# Patient Record
Sex: MALE | Race: WHITE | NOT HISPANIC OR LATINO | Employment: FULL TIME | URBAN - METROPOLITAN AREA
[De-identification: names, ages, dates, MRNs, and addresses within clinical notes are randomized per-mention and may not be internally consistent; named-entity substitution may affect disease eponyms.]

---

## 2017-01-04 DIAGNOSIS — E78.2 MIXED HYPERLIPIDEMIA: Primary | ICD-10-CM

## 2017-01-04 RX ORDER — ATORVASTATIN CALCIUM 10 MG/1
10 TABLET, FILM COATED ORAL DAILY
Qty: 30 TABLET | Refills: 3 | Status: SHIPPED | OUTPATIENT
Start: 2017-01-04 | End: 2017-03-13

## 2017-01-05 ENCOUNTER — TELEPHONE (OUTPATIENT)
Dept: FAMILY MEDICINE CLINIC | Facility: CLINIC | Age: 52
End: 2017-01-05

## 2017-01-05 NOTE — TELEPHONE ENCOUNTER
----- Message from Nuria Byers PA-C sent at 1/4/2017  9:10 AM EST -----  1.  Please notify patient that his FBS was normal at 80.  2.  Cholesterol was 221,triglycerides was 175,HDL was 36 and LDL was 150. These were abnormal. These results have been running high for the past year.  Diet is not helping.  He needs to be started on a statin RX.  I have called to pharmacy Lipitor 10 mg to pharmacy.  He needs to decreased his fatty food intake and increased his physical activity.  Also decrease ETOH.  Plan to repeat FBW in 3 months ( CMP and Lipid).

## 2017-03-09 LAB
ALBUMIN SERPL-MCNC: 4.4 G/DL (ref 3.5–5.2)
ALBUMIN/GLOB SERPL: 2.3 G/DL
ALP SERPL-CCNC: 57 U/L (ref 40–129)
ALT SERPL-CCNC: 27 U/L (ref 5–41)
AST SERPL-CCNC: 16 U/L (ref 5–40)
BILIRUB SERPL-MCNC: 1.1 MG/DL (ref 0.2–1.2)
BUN SERPL-MCNC: 11 MG/DL (ref 6–20)
BUN/CREAT SERPL: 10.8 (ref 7–25)
CALCIUM SERPL-MCNC: 9.1 MG/DL (ref 8.6–10.5)
CHLORIDE SERPL-SCNC: 100 MMOL/L (ref 98–107)
CHOLEST SERPL-MCNC: 161 MG/DL (ref 0–200)
CO2 SERPL-SCNC: 32.4 MMOL/L (ref 22–29)
CREAT SERPL-MCNC: 1.02 MG/DL (ref 0.76–1.27)
GLOBULIN SER CALC-MCNC: 1.9 GM/DL
GLUCOSE SERPL-MCNC: 94 MG/DL (ref 65–99)
HDLC SERPL-MCNC: 39 MG/DL (ref 40–60)
LDLC SERPL CALC-MCNC: 86 MG/DL (ref 0–100)
LDLC/HDLC SERPL: 2.19 {RATIO}
POTASSIUM SERPL-SCNC: 4.4 MMOL/L (ref 3.5–5.2)
PROT SERPL-MCNC: 6.3 G/DL (ref 6–8.5)
SODIUM SERPL-SCNC: 140 MMOL/L (ref 136–145)
TRIGL SERPL-MCNC: 182 MG/DL (ref 0–150)
VLDLC SERPL CALC-MCNC: 36.4 MG/DL (ref 8–32)

## 2017-03-13 ENCOUNTER — TELEPHONE (OUTPATIENT)
Dept: FAMILY MEDICINE CLINIC | Facility: CLINIC | Age: 52
End: 2017-03-13

## 2017-03-13 ENCOUNTER — LAB (OUTPATIENT)
Dept: FAMILY MEDICINE CLINIC | Facility: CLINIC | Age: 52
End: 2017-03-13

## 2017-03-13 DIAGNOSIS — E78.2 MIXED HYPERLIPIDEMIA: ICD-10-CM

## 2017-03-13 DIAGNOSIS — E78.2 MIXED HYPERLIPIDEMIA: Primary | ICD-10-CM

## 2017-03-13 RX ORDER — ATORVASTATIN CALCIUM 20 MG/1
20 TABLET, FILM COATED ORAL DAILY
Qty: 30 TABLET | Refills: 3 | Status: SHIPPED | OUTPATIENT
Start: 2017-03-13 | End: 2018-11-02

## 2017-03-13 NOTE — TELEPHONE ENCOUNTER
----- Message from Nuria Byers PA-C sent at 3/10/2017  7:31 AM EST -----  1.  Please notify patient that fasting blood sugar was normal at 94.  2.  Cholesterol 161, triglycerides 182, HDL 39 and LDL was 86.  His cholesterol readings have improved from 2 months ago.  His triglycerides are still slightly elevated.  Is he still taking his generic Lipitor medication?

## 2017-04-24 ENCOUNTER — TELEPHONE (OUTPATIENT)
Dept: GASTROENTEROLOGY | Facility: CLINIC | Age: 52
End: 2017-04-24

## 2017-04-25 ENCOUNTER — HOSPITAL ENCOUNTER (OUTPATIENT)
Facility: HOSPITAL | Age: 52
Setting detail: HOSPITAL OUTPATIENT SURGERY
End: 2017-04-25
Attending: SURGERY | Admitting: SURGERY

## 2017-04-25 ENCOUNTER — PREP FOR SURGERY (OUTPATIENT)
Dept: SURGERY | Facility: CLINIC | Age: 52
End: 2017-04-25

## 2017-04-25 DIAGNOSIS — Z12.11 COLON CANCER SCREENING: Primary | ICD-10-CM

## 2017-05-02 ENCOUNTER — TELEPHONE (OUTPATIENT)
Dept: SURGERY | Facility: CLINIC | Age: 52
End: 2017-05-02

## 2017-05-02 ENCOUNTER — PREP FOR SURGERY (OUTPATIENT)
Dept: OTHER | Facility: HOSPITAL | Age: 52
End: 2017-05-02

## 2017-05-02 DIAGNOSIS — Z12.11 COLON CANCER SCREENING: Primary | ICD-10-CM

## 2017-05-30 ENCOUNTER — TELEPHONE (OUTPATIENT)
Dept: SURGERY | Facility: CLINIC | Age: 52
End: 2017-05-30

## 2017-12-11 ENCOUNTER — OFFICE VISIT (OUTPATIENT)
Dept: FAMILY MEDICINE CLINIC | Facility: CLINIC | Age: 52
End: 2017-12-11

## 2017-12-11 VITALS
BODY MASS INDEX: 25.67 KG/M2 | OXYGEN SATURATION: 98 % | SYSTOLIC BLOOD PRESSURE: 122 MMHG | TEMPERATURE: 98.3 F | HEIGHT: 74 IN | HEART RATE: 79 BPM | WEIGHT: 200 LBS | DIASTOLIC BLOOD PRESSURE: 90 MMHG | RESPIRATION RATE: 16 BRPM

## 2017-12-11 DIAGNOSIS — I10 ESSENTIAL HYPERTENSION: ICD-10-CM

## 2017-12-11 DIAGNOSIS — E78.2 MIXED HYPERLIPIDEMIA: Primary | ICD-10-CM

## 2017-12-11 DIAGNOSIS — R07.89 CHEST PAIN, ATYPICAL: ICD-10-CM

## 2017-12-11 LAB
ALBUMIN SERPL-MCNC: 4.5 G/DL (ref 3.5–5.2)
ALBUMIN/GLOB SERPL: 2.8 G/DL
ALP SERPL-CCNC: 49 U/L (ref 40–129)
ALT SERPL-CCNC: 23 U/L (ref 5–41)
AST SERPL-CCNC: 15 U/L (ref 5–40)
BASOPHILS # BLD AUTO: 0.05 10*3/MM3 (ref 0–0.2)
BASOPHILS NFR BLD AUTO: 0.7 % (ref 0–2)
BILIRUB SERPL-MCNC: 1 MG/DL (ref 0.2–1.2)
BUN SERPL-MCNC: 12 MG/DL (ref 6–20)
BUN/CREAT SERPL: 12.1 (ref 7–25)
CALCIUM SERPL-MCNC: 9.1 MG/DL (ref 8.6–10.5)
CHLORIDE SERPL-SCNC: 104 MMOL/L (ref 98–107)
CHOLEST SERPL-MCNC: 185 MG/DL (ref 0–200)
CO2 SERPL-SCNC: 29.7 MMOL/L (ref 22–29)
CREAT SERPL-MCNC: 0.99 MG/DL (ref 0.76–1.27)
EOSINOPHIL # BLD AUTO: 0.21 10*3/MM3 (ref 0.1–0.3)
EOSINOPHIL NFR BLD AUTO: 3 % (ref 0–4)
ERYTHROCYTE [DISTWIDTH] IN BLOOD BY AUTOMATED COUNT: 12.8 % (ref 11.5–14.5)
GFR SERPLBLD CREATININE-BSD FMLA CKD-EPI: 79 ML/MIN/1.73
GFR SERPLBLD CREATININE-BSD FMLA CKD-EPI: 96 ML/MIN/1.73
GLOBULIN SER CALC-MCNC: 1.6 GM/DL
GLUCOSE SERPL-MCNC: 95 MG/DL (ref 65–99)
HCT VFR BLD AUTO: 47.9 % (ref 42–52)
HDLC SERPL-MCNC: 41 MG/DL (ref 40–60)
HGB BLD-MCNC: 15.9 G/DL (ref 14–18)
IMM GRANULOCYTES # BLD: 0.03 10*3/MM3 (ref 0–0.03)
IMM GRANULOCYTES NFR BLD: 0.4 % (ref 0–0.5)
LDLC SERPL CALC-MCNC: 117 MG/DL (ref 0–100)
LDLC/HDLC SERPL: 2.86 {RATIO}
LYMPHOCYTES # BLD AUTO: 1.3 10*3/MM3 (ref 0.6–4.8)
LYMPHOCYTES NFR BLD AUTO: 18.8 % (ref 20–45)
MCH RBC QN AUTO: 29.4 PG (ref 27–31)
MCHC RBC AUTO-ENTMCNC: 33.2 G/DL (ref 31–37)
MCV RBC AUTO: 88.5 FL (ref 80–94)
MONOCYTES # BLD AUTO: 0.67 10*3/MM3 (ref 0–1)
MONOCYTES NFR BLD AUTO: 9.7 % (ref 3–8)
NEUTROPHILS # BLD AUTO: 4.64 10*3/MM3 (ref 1.5–8.3)
NEUTROPHILS NFR BLD AUTO: 67.4 % (ref 45–70)
NRBC BLD AUTO-RTO: 0 /100 WBC (ref 0–0)
PLATELET # BLD AUTO: 255 10*3/MM3 (ref 140–500)
POTASSIUM SERPL-SCNC: 4.6 MMOL/L (ref 3.5–5.2)
PROT SERPL-MCNC: 6.1 G/DL (ref 6–8.5)
RBC # BLD AUTO: 5.41 10*6/MM3 (ref 4.7–6.1)
SODIUM SERPL-SCNC: 145 MMOL/L (ref 136–145)
TRIGL SERPL-MCNC: 134 MG/DL (ref 0–150)
VLDLC SERPL CALC-MCNC: 26.8 MG/DL (ref 8–32)
WBC # BLD AUTO: 6.9 10*3/MM3 (ref 4.8–10.8)

## 2017-12-11 PROCEDURE — 93000 ELECTROCARDIOGRAM COMPLETE: CPT | Performed by: PHYSICIAN ASSISTANT

## 2017-12-11 PROCEDURE — 99214 OFFICE O/P EST MOD 30 MIN: CPT | Performed by: PHYSICIAN ASSISTANT

## 2017-12-11 NOTE — PROGRESS NOTES
Subjective   Pj Steele is a 52 y.o. male.   Chief Complaint   Patient presents with   • Hypertension     management   • Hyperlipidemia     Management       History of Present Illness     Pj is a 52-year-old male who presents for hyperlipidemia and hypertension management.  He has lost 11 pounds since December 16, 2016. Date she has not been taking his blood pressure medication regularly.  This is due to his wife undergoing chemotherapy for plasma cell leukemia.  States he has been having a hectic schedule with working and taking care of his wife.  States he doesn't need anything for depression or anxiety at this time.  Denied any suicidal or homicidal ideation.  Pj states he had one episode of chest pain yesterday afternoon.  He has one episode of radiation of pain down left arm.  He has not had a heart attack or any other symptoms in the past.  Denied any chest pain at today's office visit, shortness of air, dizziness, cough, headache, or swelling of ankles.  His appetite has been slightly healthy and sleep has been normal.  States he is fasting at today's office visit.  He is doing well with the cholesterol medication.  Denied any muscle aches.  Bowel movements have been daily without dark black tarry stools.  He had the flu shot in September 2017.      The following portions of the patient's history were reviewed and updated as appropriate: allergies, current medications, past family history, past medical history, past social history and past surgical history.    Review of Systems   Constitutional: Negative.    HENT: Negative.    Eyes: Negative.    Respiratory: Negative.  Negative for cough, shortness of breath and wheezing.    Cardiovascular: Positive for chest pain. Negative for palpitations and leg swelling.   Gastrointestinal: Negative.  Negative for constipation, diarrhea, nausea and vomiting.   Endocrine: Negative.    Genitourinary: Negative.    Musculoskeletal: Negative.    Skin: Negative.   "  Allergic/Immunologic: Negative.    Neurological: Negative.  Negative for dizziness, light-headedness and headaches.   Hematological: Negative.    Psychiatric/Behavioral: Negative.  Negative for self-injury, sleep disturbance and suicidal ideas.   All other systems reviewed and are negative.    Vitals:    12/11/17 0740   BP: 122/90   BP Location: Left arm   Patient Position: Sitting   Cuff Size: Adult   Pulse: 79   Resp: 16   Temp: 98.3 °F (36.8 °C)   TempSrc: Oral   SpO2: 98%   Weight: 90.7 kg (200 lb)   Height: 188 cm (74\")       Wt Readings from Last 3 Encounters:   12/11/17 90.7 kg (200 lb)   12/16/16 95.7 kg (211 lb)   09/19/16 94.7 kg (208 lb 12.8 oz)       BP Readings from Last 3 Encounters:   12/11/17 122/90   12/16/16 132/78   09/19/16 132/80     Body mass index is 25.68 kg/(m^2).  Allergies   Allergen Reactions   • Amoxicillin          Objective   Physical Exam   Constitutional: He is oriented to person, place, and time. Vital signs are normal. He appears well-developed.   Neck: Trachea normal and phonation normal. Neck supple. Carotid bruit is not present. No edema present.   Cardiovascular: Normal rate, regular rhythm, S1 normal, S2 normal, normal heart sounds and normal pulses.    Pulmonary/Chest: Effort normal and breath sounds normal.   Abdominal: Soft. Normal appearance and bowel sounds are normal. There is no hepatomegaly. There is no tenderness.   Neurological: He is alert and oriented to person, place, and time.   Skin: Skin is warm, dry and intact.   Psychiatric: He has a normal mood and affect. His speech is normal and behavior is normal. Judgment and thought content normal. Cognition and memory are normal.           ECG 12 Lead  Date/Time: 12/11/2017 8:10 AM  Performed by: JARON WATSON  Authorized by: JARON WATSON   Comparison: not compared with previous ECG   Previous ECG: no previous ECG available  Rhythm: sinus rhythm  Rate: normal  BPM: 66  Conduction: conduction normal  ST " Segments: ST segments normal  QRS axis: normal  Clinical impression: normal ECG  Comments: Indiictation: Atypical chest pain with hypertension history  P/LA:  122/160 ms  QRS:  106 ms  QT/Qtc:  420/440 ms              Assessment/Plan   Pj was seen today for hypertension and hyperlipidemia.    Diagnoses and all orders for this visit:    Mixed hyperlipidemia  -     CBC & Differential  -     Comprehensive Metabolic Panel  -     Lipid Panel With LDL / HDL Ratio  -     Ambulatory Referral to Cardiology    Essential hypertension  -     ECG 12 Lead  -     CBC & Differential  -     Comprehensive Metabolic Panel  -     Lipid Panel With LDL / HDL Ratio  -     Ambulatory Referral to Cardiology    Chest pain, atypical  -     ECG 12 Lead  -     CBC & Differential  -     Comprehensive Metabolic Panel  -     Lipid Panel With LDL / HDL Ratio  -     Ambulatory Referral to Cardiology    1.  Atypical chest pain with hypertension: I have rechecked his blood pressure at today's office visit and got 120/86 and left arm.  I have stressed to him the importance of taking his blood pressure medication daily.  Pj will keep a blood pressure log at home.  He will update blood pressure readings next week.  He may stop by office next week for blood pressure check as well.  We will hold any change in medication at this time due to him not taking his medication routinely.  Pj will return to office in one month for recheck on hypertension.  Pj had a EKG at today's office visit.  I will schedule a referral to cardiology for possible stress testing.  Pj was instructed if chest pain reoccurs he is to go to the nearest emergency room for evaluation and treatment.  He voiced understanding.    2.  Chronic hyperlipidemia: Pj is doing well with the medication.  He will have a CBC, CMP and a lipid profile collected at today's office visit.  Pj was instructed to eat healthy and try to exercise.  He'll be notified of test results and any  medication changes and results are completed.  He voiced understanding.          Jamil transcription disclaimer    Much of this encounter note is an electronic transcription/translation of spoken language to printed text.  The electronic translation of spoken language may permit erroneous, or at times, nonsensical words or phrases to be inadvertently transcribed.  Although I have reviewed the note for such errors, some may still exist.    Nuria Byers PA-C  Family Practice

## 2017-12-13 ENCOUNTER — TELEPHONE (OUTPATIENT)
Dept: FAMILY MEDICINE CLINIC | Facility: CLINIC | Age: 52
End: 2017-12-13

## 2017-12-13 NOTE — TELEPHONE ENCOUNTER
----- Message from Nuria Byers PA-C sent at 12/13/2017  7:12 AM EST -----  1.   Notify patient that CBC was normal.  2.   Fasting blood sugar was normal at 95.  3.  Cholesterol was 185, triglycerides 134, HDL 41 and LDL was 117.  LDL was slightly elevated.  Please decrease fatty food intake.  Plan to repeat fasting blood work in one year.

## 2017-12-30 DIAGNOSIS — I10 ESSENTIAL HYPERTENSION: ICD-10-CM

## 2018-01-02 RX ORDER — LOSARTAN POTASSIUM 50 MG/1
TABLET ORAL
Qty: 90 TABLET | Refills: 2 | Status: SHIPPED | OUTPATIENT
Start: 2018-01-02 | End: 2019-02-10 | Stop reason: SDUPTHER

## 2018-03-20 ENCOUNTER — PREP FOR SURGERY (OUTPATIENT)
Dept: OTHER | Facility: HOSPITAL | Age: 53
End: 2018-03-20

## 2018-03-20 ENCOUNTER — TELEPHONE (OUTPATIENT)
Dept: SURGERY | Facility: CLINIC | Age: 53
End: 2018-03-20

## 2018-03-20 DIAGNOSIS — Z12.11 COLON CANCER SCREENING: Primary | ICD-10-CM

## 2018-03-20 NOTE — PATIENT INSTRUCTIONS
Dr. Josie Campos     Date: _____04/24/2018_________ Arrival Time: ____8:00am_____    Hospital:  [facility]([address] [place] [address]) (back of hospital at the emergency room)     [facility]([address], [place] [address] bottom floor)       Please buy the following:  • One 64oz or two 32oz bottle(s) of Gatorade or any other non-carbonated drink (NO RED OR PURPLE). You may buy sugar-free if you are diabetic. You may refrigerate if preferred.   • Dulcolax tablets (not suppository or stool softener - will need 6 tablets).  • Sofia lax 238 grams (8.3oz) powder or generic form polyethylene glycol 3350.  • One bottle of Infants’ Mylicon liquid ask pharmacist for substitute if not available.  SEVEN DAYS BEFORE STOP ASPIRIN, ADVIL, ALEVE, MOTRIN, IBU or anything listed on the back of this form.  The day prior to colonoscopy, you will need to follow a clear liquid diet.   Clear liquid diet requirements:  You may consume water, fruit juices, jello, clear broth or bouillon, popsicles, Sprite, sports drinks, etc. (no orange, grapefruit or V-8). Please consume plenty of clear liquids. AVOID: All solid foods, dairy products and anything red or purple. Limit coffee and tea.  The day prior to colonoscopy, begin prep as detailed below:  1) In AM, mix all Sofia lax, all Gatorade and 3 milliliters of the Mylicon drops (.3 x 10=3ml) Stir/shake contents until completely dissolved. Chill if preferred. DO NOT DRINK YET.  2) At 9AM, take 3 tablets of Dulcolax pills with a large glass of water.  3) At 11AM, start drinking one 8oz glass of the Gatorade/Sofia lax/Mylicon solution every 15 minutes until half of solution is gone.   4) At 5PM, drink other half of solution by drinking an 8oz glass every 15 minutes.  5) At 6PM, take last 3 tablets with a large glass of water.  6) NOTHING BY MOUTH AFTER MIDNIGHT. Or 8 hours prior.  You may take your morning heart, blood pressure, seizure, psych and  breathing medications with a small sip of water. No gum or candy.  7) You will need someone to drive you home after your exam. The average time spent is around 2-3 hours. You are not to drive, operate machinery or make legal decisions the remainder of the day.  Helpful tips:  • Some people may develop nausea/vomiting during this prep. The best option for this is to stop drinking the solution for about 30 minutes, then resume drinking at a slower rate. It is important to drink entire contents of solution.  • Walking in between drinking each glass reduces bloating.  • If diabetic, use sugar-free drinks and monitor blood sugar closely to prevent low blood sugar.      Please call the office the morning of your procedure if your bowel movements are not clearish. [phone] Viviana. If it is after hours or the weekend and you need to reach the provider or the office please call [phone].  Please call the office as soon as possible if you need to reschedule or cancel your procedure you must give two weeks’ notice.  If you do not show up or frequently reschedule your procedure your provider has the option of not rescheduling.   It is your responsibility to check with your insurance company to determine benefits and out of pocket costs.     Avoid these medications 7 days prior to surgery  Please check with your prescribing doctor before stopping any medications    NSAIDS- Advil, Aleve, Motrin, Ibuprofen, Midol, Excedrin, Fiorinal, [place]  (Some cold medications may have these in them)    All herbal medications- iron, vitamin E and D, fish oil, decongestants (phenylephrine, pseudoephedrine), ginkgo, garlic, ginseng, Ryder’s wart    Mobic (meloxicam), Celebrex, Diclofenac (Voltaren), Nambumetone (Relafen), Daypro, Naproxen, Sulindac, Indomethacin, Toradol, Feldine, Salsalate, Etodolac (Lodine),     Viagra, Cialis, Levitra    Aspirin, Plavix (clopidogrel), Effient, Pletal, Coumadin, Pradaxa, Brilinta, Ticlide,  Eliquis, (Xaralto- 3 days)      Diet pills -Adipex (phentermine) -2 weeks prior

## 2018-03-21 PROBLEM — Z12.11 COLON CANCER SCREENING: Status: ACTIVE | Noted: 2018-03-21

## 2018-04-23 ENCOUNTER — ANESTHESIA EVENT (OUTPATIENT)
Dept: PERIOP | Facility: HOSPITAL | Age: 53
End: 2018-04-23

## 2018-04-24 ENCOUNTER — HOSPITAL ENCOUNTER (OUTPATIENT)
Facility: HOSPITAL | Age: 53
Setting detail: HOSPITAL OUTPATIENT SURGERY
Discharge: HOME | End: 2018-04-24
Attending: SURGERY | Admitting: SURGERY

## 2018-04-24 ENCOUNTER — ANESTHESIA (OUTPATIENT)
Dept: PERIOP | Facility: HOSPITAL | Age: 53
End: 2018-04-24

## 2018-04-24 VITALS
TEMPERATURE: 97.5 F | BODY MASS INDEX: 25.62 KG/M2 | HEART RATE: 65 BPM | DIASTOLIC BLOOD PRESSURE: 89 MMHG | SYSTOLIC BLOOD PRESSURE: 139 MMHG | RESPIRATION RATE: 16 BRPM | WEIGHT: 199.6 LBS | OXYGEN SATURATION: 97 % | HEIGHT: 74 IN

## 2018-04-24 DIAGNOSIS — Z12.11 COLON CANCER SCREENING: ICD-10-CM

## 2018-04-24 PROCEDURE — 45380 COLONOSCOPY AND BIOPSY: CPT | Performed by: SURGERY

## 2018-04-24 PROCEDURE — 25010000002 PROPOFOL 10 MG/ML EMULSION: Performed by: ANESTHESIOLOGY

## 2018-04-24 PROCEDURE — S0260 H&P FOR SURGERY: HCPCS | Performed by: SURGERY

## 2018-04-24 RX ORDER — LIDOCAINE HYDROCHLORIDE 10 MG/ML
0.5 INJECTION, SOLUTION EPIDURAL; INFILTRATION; INTRACAUDAL; PERINEURAL ONCE AS NEEDED
Status: COMPLETED | OUTPATIENT
Start: 2018-04-24 | End: 2018-04-24

## 2018-04-24 RX ORDER — PROPOFOL 10 MG/ML
VIAL (ML) INTRAVENOUS AS NEEDED
Status: DISCONTINUED | OUTPATIENT
Start: 2018-04-24 | End: 2018-04-24 | Stop reason: SURG

## 2018-04-24 RX ORDER — SODIUM CHLORIDE, SODIUM LACTATE, POTASSIUM CHLORIDE, CALCIUM CHLORIDE 600; 310; 30; 20 MG/100ML; MG/100ML; MG/100ML; MG/100ML
9 INJECTION, SOLUTION INTRAVENOUS CONTINUOUS
Status: DISCONTINUED | OUTPATIENT
Start: 2018-04-24 | End: 2018-04-24 | Stop reason: HOSPADM

## 2018-04-24 RX ORDER — MAGNESIUM HYDROXIDE 1200 MG/15ML
LIQUID ORAL AS NEEDED
Status: DISCONTINUED | OUTPATIENT
Start: 2018-04-24 | End: 2018-04-24 | Stop reason: HOSPADM

## 2018-04-24 RX ORDER — SODIUM CHLORIDE 9 MG/ML
40 INJECTION, SOLUTION INTRAVENOUS AS NEEDED
Status: DISCONTINUED | OUTPATIENT
Start: 2018-04-24 | End: 2018-04-24 | Stop reason: HOSPADM

## 2018-04-24 RX ORDER — SODIUM CHLORIDE 0.9 % (FLUSH) 0.9 %
1-10 SYRINGE (ML) INJECTION AS NEEDED
Status: DISCONTINUED | OUTPATIENT
Start: 2018-04-24 | End: 2018-04-24 | Stop reason: HOSPADM

## 2018-04-24 RX ADMIN — SODIUM CHLORIDE, POTASSIUM CHLORIDE, SODIUM LACTATE AND CALCIUM CHLORIDE 9 ML/HR: 600; 310; 30; 20 INJECTION, SOLUTION INTRAVENOUS at 08:05

## 2018-04-24 RX ADMIN — PROPOFOL 600 MG: 10 INJECTION, EMULSION INTRAVENOUS at 09:31

## 2018-04-24 RX ADMIN — LIDOCAINE HYDROCHLORIDE 0.1 ML: 10 INJECTION, SOLUTION EPIDURAL; INFILTRATION; INTRACAUDAL; PERINEURAL at 08:05

## 2018-04-24 NOTE — H&P
"General Surgery      Patient Care Team:  Nuria Byers PA-C as PCP - General    CHIEF COMPLAINT:  Colon cancer screening in patient with family history of colon cancer    HISTORY OF PRESENT ILLNESS: Patient is a 53-year-old male referred to general surgery for colon cancer screening.  He has never had a colonoscopy before.  Denies any abdominal, pelvic pain, alternating bowel movements, diarrhea, constipation, melena, hematochezia, unintentional weight loss.  He denies any problems with hemorrhoids are proctalgia.  He does have a strong family history of colon polyps in his mother and colon cancer-stage IV in his mother who was diagnosed in her 70s.  His past medical history significant for hypertension depression and past surgical history significant for right wrist excision and cholecystectomy.      Past Medical History:   Diagnosis Date   • Depression     Mild   • Hypertension      Past Surgical History:   Procedure Laterality Date   • CHOLECYSTECTOMY     • WRIST SURGERY       Family History   Problem Relation Age of Onset   • Leukemia Father    • Brain cancer Brother      Social History   Substance Use Topics   • Smoking status: Never Smoker   • Smokeless tobacco: Never Used   • Alcohol use Yes     Prescriptions Prior to Admission   Medication Sig Dispense Refill Last Dose   • atorvastatin (LIPITOR) 20 MG tablet Take 1 tablet by mouth Daily. 30 tablet 3 4/20/2018   • losartan (COZAAR) 50 MG tablet TAKE ONE TABLET BY MOUTH DAILY 90 tablet 2 4/24/2018 at 0400     Allergies:  Amoxicillin    REVIEW OF SYSTEMS:  Please see the above history of present illness for pertinent positives and negatives.  The remainder of the patient's systems have been reviewed and are negative.   Vital Signs  Temp:  [97.9 °F (36.6 °C)] 97.9 °F (36.6 °C)  Heart Rate:  [71] 71  Resp:  [16] 16  BP: (144)/(98) 144/98    Flowsheet Rows    Flowsheet Row First Filed Value   Admission Height 188 cm (74\") Documented at 04/23/2018 1436 "   Admission Weight 90.5 kg (199 lb 9.6 oz) Documented at 04/24/2018 0804           Physical Exam:  Physical Exam   Constitutional: Patient appears well-developed and well-nourished and in no acute distress   HEENT:   Head: Normocephalic and atraumatic.   Eyes:  Pupils are equal, round, and reactive to light.  Mouth and Throat: Patient has moist mucous membranes. Oropharynx is clear of any erythema or exudate.     Neck: Neck supple. No JVD present. No thyromegaly present. No lymphadenopathy present.  Cardiovascular: Regular rate, regular rhythm.  Pulmonary/Chest: Lungs are clear to auscultation bilaterally.  Abdominal: Soft, nondistended nontender positive bowel sounds in all 4 quadrants   Musculoskeletal: Normal posture.  Extremities: No edema. Pulses are palpable in all 4 extremities.  Neurological: Patient is alert and oriented.  Psychological:   Mood and behavior appropriate.  Skin: Skin is warm and dry.     Results Review:    I reviewed the patient's new clinical results.          Lab Results (most recent)     None          Imaging Results (most recent)     None          ECG/EMG Results (most recent)     None            Assessment/Plan     Colon cancer screening  Family history of colon polyps and colon cancer  High risk screening    Based on American Cancer Society guidelines I have discussed and recommended to the patient we should proceed with colonoscopy.  Pros and cons/risks and benefits discussed.  I have discussed with him the procedure, risks, benefits, complications including but not limited to risk of bleeding, post-polypectomy syndrome, perforation requiring emergent additional procedures, risk of missing a lesion, cardiopulmonary complications associated with anesthetic.  Patient understands and gives consent.    I discussed the patients findings and my recommendations with patient and his mother.      Josie Campos M.D.    04/24/18  9:31 AM

## 2018-04-24 NOTE — ANESTHESIA PREPROCEDURE EVALUATION
Anesthesia Evaluation     Patient summary reviewed and Nursing notes reviewed   no history of anesthetic complications:  NPO Solid Status: > 8 hours  NPO Liquid Status: > 8 hours           Airway   Mallampati: I  TM distance: >3 FB  Neck ROM: full  No difficulty expected  Dental - normal exam     Pulmonary - normal exam    breath sounds clear to auscultation  Sleep apnea: snores.  Cardiovascular - normal exam  Exercise tolerance: good (4-7 METS)    ECG reviewed  Rhythm: regular  Rate: normal    (+) hypertension (took 3 am), hyperlipidemia,     ROS comment: Nuria Byers PA-C     12/11/2017 10:01 AM    ECG 12 Lead  Date/Time: 12/11/2017 8:10 AM  Performed by: NURIA BYERS  Authorized by: NURIA BYERS   Comparison: not compared with previous ECG   Previous ECG: no previous ECG available  Rhythm: sinus rhythm  Rate: normal  BPM: 66  Conduction: conduction normal  ST Segments: ST segments normal  QRS axis: normal  Clinical impression: normal ECG  Comments: Indiictation: Atypical chest pain with hypertension history  P/CT:  122/160 ms  QRS:  106 ms  QT/Qtc:  420/440 ms    Neuro/Psych  GI/Hepatic/Renal/Endo - negative ROS     Musculoskeletal     (+) neck stiffness (current),   Abdominal  - normal exam   Substance History   (-) drug useAlcohol use: occ.     OB/GYN negative ob/gyn ROS         Other - negative ROS                       Anesthesia Plan    ASA 2     MAC     intravenous induction   Anesthetic plan and risks discussed with patient.

## 2018-04-24 NOTE — DISCHARGE INSTRUCTIONS
HOLD ASPIRIN, ALEVE, IBUPROFEN, MOTRIN, ALL ANTI INFLAMMATORIES FOR 7 DAYS.  USE TYLENOL ONLY FOR ACHES AND PAINS.

## 2018-04-24 NOTE — OP NOTE
Colonoscopy Procedure Note  Date of procedure: 4/24/18    Pre-operative Diagnosis:  Colon cancer screening in patient with family history of colon cancer and colon polyps, high risk screening    Post-operative Diagnosis: Colon polyps ×4                       Internal hemorrhoids              Procedure: Colonoscopy with polypectomy    Surgeon: Josie Campos M.D.    Anesthetic: RONNY Perez M.D.    EBL : Minimal    Complications : None    Indications:  Patient is a 53-year-old male referred to general surgery for colon cancer screening.  He has never had a colonoscopy before.  Denies any abdominal, pelvic pain, alternating bowel movements, melena, hematochezia.  He has strong family history of colon polyps and colon cancer in his mother.  We discussed proceeding with colonoscopy-pros and cons risks and benefits were discussed.  We discussed the procedure, risks, benefits, complications including but not limited to risk of bleeding, post-polypectomy syndrome, perforation requiring emergent additional procedures, risk of missing a lesion and complications associated with anesthetic.  Patient understood and gave informed consent.    Findings/Treatments: Colonoscopy preparation was suboptimal.  I had to copiously irrigate and lavaged to obtain better visualization, this could certainly increase the likelihood of missing a diminutive lesion.  Sessile polyps ×4.  Internal hemorrhoids.         Scope Withdrawal Time:   Greater than 6 minutes      Recommendations:  -Await pathology., -If adenoma is present, repeat colonoscopy in 3 years with a 2 day prep but we'll make final recommendations once pathology is available., -High fiber diet., -Follow up/office will contact patient to make further recommendations..    Procedure Details     After discussing the benefits and risks of the procedure with the patient, not limited to but including:  Bleeding, infection, perforation, aspiration; informed consent was signed.  The  patient was taken into the endoscopy room at [facility] and placed in the left lateral decubitus position.  MAC anesthesia was given with appropriate cardiopulmonary monitoring.  A rectal exam was performed.  Sphincter tone was normal, no rectal masses, no external hemorrhoids.  The colonoscope was then inserted and carefully advanced to the cecum while visualizing the mucosa. Colon preparation was suboptimal.  There was thick stool particularly in the right colon, transverse and descending colon, this had to be copiously irrigated and lavaged in order to get better visualization.  This could certainly increase the likelihood of missing a diminutive lesion.   The cecum was identified by the anatomic landmarks i.e. the cecal strap,  ileocecal valve and the orifice of the appendix.  Scope was then gradually withdrawn, carefully evaluating the colon mucosa in a circumferential fashion with findings as follows: Cecum, ileocecal valve, appendiceal orifice no gross mucosal lesions, no polyps noted.  Scope was brought back into the ascending colon where behind a fold 4 mm sessile polyp noted resected retrieved using cold biopsy forceps, EBL minimal and hemostasis assured.  Scope was brought back to remaining ascending colon, hepatic flexure into the transverse colon where 2 sessile polyps 3-4 mm resected retrieved using cold biopsy forceps, EBL minimal and hemostasis assured.  Scope was then brought back through remaining transverse colon, splenic flexure, descending colon into sigmoid colon where another sessile polyp which was diminutive noted resected retrieved using cold biopsy forceps, EBL minimal and hemostasis assured.  This was at 20 cm.  Scope was then brought back into the rectum, in the distal rectum, retroflex examination performed with evidence of nonbleeding internal hemorrhoids.  Scope was retroflexed, straightened and removed from the patient while desufflated in the colon.    Patient was  awakened, his anesthesia was reversed, and he was taken to recovery room in stable condition having tolerated his procedure well with no immediate complications.    Josie Campos MD

## 2018-04-24 NOTE — ANESTHESIA POSTPROCEDURE EVALUATION
Patient: Pj Steele    Procedure Summary     Date:  04/24/18 Room / Location:  [facility] ENDOSCOPY 2 /  [facility]    Anesthesia Start:  0927 Anesthesia Stop:  1013    Procedure:  COLONOSCOPY, polypectomy (N/A ) Diagnosis:       Colon polyps      Internal hemorrhoids      (Colon cancer screening [Z12.11])    Surgeon:  Josie Campos MD Provider:  Abbi Whitkaer MD    Anesthesia Type:  MAC ASA Status:  2          Anesthesia Type: MAC  Last vitals  BP   121/78 (04/24/18 1020)   Temp   97.5 °F (36.4 °C) (04/24/18 1014)   Pulse   73 (04/24/18 1020)   Resp   14 (04/24/18 1020)     SpO2   95 % (04/24/18 1020)     Post Anesthesia Care and Evaluation    Patient location during evaluation: bedside  Patient participation: complete - patient participated  Level of consciousness: awake and alert  Pain score: 0  Pain management: adequate  Airway patency: patent  Anesthetic complications: No anesthetic complications  PONV Status: none  Cardiovascular status: acceptable  Respiratory status: acceptable  Hydration status: acceptable

## 2018-04-27 LAB
LAB AP CASE REPORT: NORMAL
Lab: NORMAL
PATH REPORT.FINAL DX SPEC: NORMAL

## 2018-11-02 ENCOUNTER — OFFICE VISIT (OUTPATIENT)
Dept: FAMILY MEDICINE CLINIC | Facility: CLINIC | Age: 53
End: 2018-11-02

## 2018-11-02 VITALS
DIASTOLIC BLOOD PRESSURE: 70 MMHG | RESPIRATION RATE: 16 BRPM | TEMPERATURE: 97.2 F | WEIGHT: 210 LBS | BODY MASS INDEX: 26.95 KG/M2 | SYSTOLIC BLOOD PRESSURE: 122 MMHG | HEART RATE: 75 BPM | OXYGEN SATURATION: 98 % | HEIGHT: 74 IN

## 2018-11-02 DIAGNOSIS — E78.2 MIXED HYPERLIPIDEMIA: ICD-10-CM

## 2018-11-02 DIAGNOSIS — Z00.00 ENCOUNTER FOR ANNUAL PHYSICAL EXAM: Primary | ICD-10-CM

## 2018-11-02 DIAGNOSIS — I10 ESSENTIAL HYPERTENSION: ICD-10-CM

## 2018-11-02 DIAGNOSIS — Z23 NEED FOR INFLUENZA VACCINATION: ICD-10-CM

## 2018-11-02 DIAGNOSIS — Z12.5 SCREENING FOR PROSTATE CANCER: ICD-10-CM

## 2018-11-02 LAB
ALBUMIN SERPL-MCNC: 4.7 G/DL (ref 3.5–5.2)
ALBUMIN/GLOB SERPL: 2.8 G/DL
ALP SERPL-CCNC: 54 U/L (ref 40–129)
ALT SERPL-CCNC: 30 U/L (ref 5–41)
AST SERPL-CCNC: 19 U/L (ref 5–40)
BASOPHILS # BLD AUTO: 0.06 10*3/MM3 (ref 0–0.2)
BASOPHILS NFR BLD AUTO: 0.8 % (ref 0–2)
BILIRUB SERPL-MCNC: 0.9 MG/DL (ref 0.2–1.2)
BUN SERPL-MCNC: 11 MG/DL (ref 6–20)
BUN/CREAT SERPL: 12 (ref 7–25)
CALCIUM SERPL-MCNC: 9.1 MG/DL (ref 8.6–10.5)
CHLORIDE SERPL-SCNC: 103 MMOL/L (ref 98–107)
CHOLEST SERPL-MCNC: 214 MG/DL (ref 0–200)
CO2 SERPL-SCNC: 29.1 MMOL/L (ref 22–29)
CREAT SERPL-MCNC: 0.92 MG/DL (ref 0.76–1.27)
EOSINOPHIL # BLD AUTO: 0.24 10*3/MM3 (ref 0.1–0.3)
EOSINOPHIL NFR BLD AUTO: 3.2 % (ref 0–4)
ERYTHROCYTE [DISTWIDTH] IN BLOOD BY AUTOMATED COUNT: 12.3 % (ref 11.5–14.5)
GLOBULIN SER CALC-MCNC: 1.7 GM/DL
GLUCOSE SERPL-MCNC: 90 MG/DL (ref 65–99)
HCT VFR BLD AUTO: 47.4 % (ref 42–52)
HDLC SERPL-MCNC: 46 MG/DL (ref 40–60)
HGB BLD-MCNC: 15.5 G/DL (ref 14–18)
IMM GRANULOCYTES # BLD: 0.02 10*3/MM3 (ref 0–0.03)
IMM GRANULOCYTES NFR BLD: 0.3 % (ref 0–0.5)
LDLC SERPL CALC-MCNC: 144 MG/DL (ref 0–100)
LDLC/HDLC SERPL: 3.13 {RATIO}
LYMPHOCYTES # BLD AUTO: 1.42 10*3/MM3 (ref 0.6–4.8)
LYMPHOCYTES NFR BLD AUTO: 19.1 % (ref 20–45)
MCH RBC QN AUTO: 28.9 PG (ref 27–31)
MCHC RBC AUTO-ENTMCNC: 32.7 G/DL (ref 31–37)
MCV RBC AUTO: 88.3 FL (ref 80–94)
MONOCYTES # BLD AUTO: 0.63 10*3/MM3 (ref 0–1)
MONOCYTES NFR BLD AUTO: 8.5 % (ref 3–8)
NEUTROPHILS # BLD AUTO: 5.06 10*3/MM3 (ref 1.5–8.3)
NEUTROPHILS NFR BLD AUTO: 68.1 % (ref 45–70)
NRBC BLD AUTO-RTO: 0 /100 WBC (ref 0–0)
PLATELET # BLD AUTO: 279 10*3/MM3 (ref 140–500)
POTASSIUM SERPL-SCNC: 4.6 MMOL/L (ref 3.5–5.2)
PROT SERPL-MCNC: 6.4 G/DL (ref 6–8.5)
PSA SERPL-MCNC: 0.98 NG/ML (ref 0–4)
RBC # BLD AUTO: 5.37 10*6/MM3 (ref 4.7–6.1)
SODIUM SERPL-SCNC: 143 MMOL/L (ref 136–145)
TRIGL SERPL-MCNC: 121 MG/DL (ref 0–150)
VLDLC SERPL CALC-MCNC: 24.2 MG/DL (ref 8–32)
WBC # BLD AUTO: 7.43 10*3/MM3 (ref 4.8–10.8)

## 2018-11-02 PROCEDURE — 90471 IMMUNIZATION ADMIN: CPT | Performed by: PHYSICIAN ASSISTANT

## 2018-11-02 PROCEDURE — 99396 PREV VISIT EST AGE 40-64: CPT | Performed by: PHYSICIAN ASSISTANT

## 2018-11-02 PROCEDURE — 90674 CCIIV4 VAC NO PRSV 0.5 ML IM: CPT | Performed by: PHYSICIAN ASSISTANT

## 2018-11-02 RX ORDER — SODIUM PHOSPHATE,MONO-DIBASIC 19G-7G/118
ENEMA (ML) RECTAL
COMMUNITY
End: 2019-05-02

## 2018-11-02 RX ORDER — ASPIRIN 81 MG/1
81 TABLET, CHEWABLE ORAL DAILY
COMMUNITY
End: 2019-05-02

## 2018-11-02 RX ORDER — MULTIVITAMIN/IRON/FOLIC ACID 18MG-0.4MG
TABLET ORAL DAILY
COMMUNITY

## 2018-11-02 RX ORDER — MULTIVITAMIN WITH IRON
TABLET ORAL
COMMUNITY

## 2018-11-02 RX ORDER — CHLORAL HYDRATE 500 MG
CAPSULE ORAL
COMMUNITY

## 2018-11-02 NOTE — PROGRESS NOTES
Subjective   Pj Steele is a 53 y.o. male.   Chief Complaint   Patient presents with   • Annual Exam       History of Present Illness     Pj is a 53-year-old male who presents for annual physical examination.  He has gained 11 pounds since April 24, 2018. His mother was diagnosed with stage 4 colon cancer.  Father has dementia.  Wife is battling Plasma cell cancer.  States he is handling the stress without medication for now.  Diet has been slightly healthy.  Sleep has been restless.  Denied any chest pain,shortness of air,wheezing,abdomen pain,urinary issues or swelling of ankles.  Pj would like to have the flu immunization at office visit today.  Denied any upper respiratory symptoms, fevers or chills. Bowel movements are daily without dark black tarry stools.  Pj had a colonoscopy in April 2018.    The following portions of the patient's history were reviewed and updated as appropriate: allergies, current medications, past family history, past medical history, past social history and past surgical history.    Review of Systems   Constitutional: Negative.    HENT: Negative.    Eyes: Negative.    Respiratory: Negative.  Negative for cough, shortness of breath and wheezing.    Cardiovascular: Negative.  Negative for palpitations and leg swelling.   Gastrointestinal: Negative.    Endocrine: Negative.    Genitourinary: Negative.    Musculoskeletal: Negative.    Skin: Negative.    Allergic/Immunologic: Negative.    Neurological: Negative.    Hematological: Negative.    Psychiatric/Behavioral: Negative.    All other systems reviewed and are negative.      Social History   Substance Use Topics   • Smoking status: Never Smoker   • Smokeless tobacco: Never Used   • Alcohol use Yes     Vitals:    11/02/18 0842   BP: 122/70   BP Location: Left arm   Patient Position: Sitting   Cuff Size: Adult   Pulse: 75   Resp: 16   Temp: 97.2 °F (36.2 °C)   TempSrc: Oral   SpO2: 98%   Weight: 95.3 kg (210 lb)   Height: 188 cm  "(74\")       Wt Readings from Last 3 Encounters:   11/02/18 95.3 kg (210 lb)   04/24/18 90.5 kg (199 lb 9.6 oz)   12/11/17 90.7 kg (200 lb)       BP Readings from Last 3 Encounters:   11/02/18 122/70   04/24/18 139/89   12/11/17 122/90     Body mass index is 26.96 kg/m².  Allergies   Allergen Reactions   • Statins Myalgia   • Amoxicillin Rash       Objective   Physical Exam   Constitutional: He is oriented to person, place, and time. Vital signs are normal. He appears well-developed and well-nourished.   HENT:   Head: Normocephalic and atraumatic.   Right Ear: Hearing, tympanic membrane, external ear and ear canal normal.   Left Ear: Hearing, tympanic membrane, external ear and ear canal normal.   Nose: Nose normal. Right sinus exhibits no maxillary sinus tenderness and no frontal sinus tenderness. Left sinus exhibits no maxillary sinus tenderness and no frontal sinus tenderness.   Mouth/Throat: Uvula is midline, oropharynx is clear and moist and mucous membranes are normal.   Eyes: Pupils are equal, round, and reactive to light. Conjunctivae, EOM and lids are normal.   Neck: Trachea normal and phonation normal. Neck supple. Carotid bruit is not present. No thyroid mass and no thyromegaly present.   Cardiovascular: Normal rate, regular rhythm, S1 normal, S2 normal, normal heart sounds, intact distal pulses and normal pulses.    Pulmonary/Chest: Effort normal and breath sounds normal.   Abdominal: Soft. Normal appearance, normal aorta and bowel sounds are normal. There is no hepatomegaly. There is no tenderness.   Lymphadenopathy:     He has no cervical adenopathy.   Neurological: He is alert and oriented to person, place, and time. He has normal reflexes.   Skin: Skin is warm, dry and intact. Capillary refill takes less than 2 seconds.   Psychiatric: He has a normal mood and affect. His speech is normal and behavior is normal. Judgment and thought content normal. Cognition and memory are normal.       Assessment/Plan "   Pj was seen today for annual exam.    Diagnoses and all orders for this visit:    Encounter for annual physical exam  -     CBC & Differential  -     Comprehensive Metabolic Panel    Mixed hyperlipidemia  -     Comprehensive Metabolic Panel  -     Lipid Panel With LDL / HDL Ratio    Essential hypertension  -     CBC & Differential  -     Comprehensive Metabolic Panel  -     Lipid Panel With LDL / HDL Ratio    Screening for prostate cancer  -     PSA SCREENING    Need for influenza vaccination  -     Flucelvax Quad=>4Years (Vial)    1.  Annual Physical examination with hypertension: I have rechecked his blood pressure at office visit today and got 120/76 in left arm.  He will continue his current medications at home.  No refills required at this time.  I have asked Pj to follow-up in 6 months.  2.  Chronic and stable hyperlipidemia: He is fasting will have a CBC, CMP and a lipid profile.  I will be notified of test results when completed.  He stopped taking the statin medication due to causing muscle aches.  He will continue his omega-3 medication for now.  3.  Need for screening prostate cancer: In addition to above blood work he will have a PSA blood work collected today.  Pj will be notified of test results when completed.  4.  Need for influenza vaccination:  Pj has given written consent to receive the flu immunization at office visit today.  Currently asymptomatic.

## 2019-02-10 DIAGNOSIS — I10 ESSENTIAL HYPERTENSION: ICD-10-CM

## 2019-02-11 RX ORDER — LOSARTAN POTASSIUM 50 MG/1
TABLET ORAL
Qty: 30 TABLET | Refills: 1 | Status: SHIPPED | OUTPATIENT
Start: 2019-02-11 | End: 2019-05-02

## 2019-05-02 ENCOUNTER — OFFICE VISIT (OUTPATIENT)
Dept: FAMILY MEDICINE CLINIC | Facility: CLINIC | Age: 54
End: 2019-05-02

## 2019-05-02 VITALS
TEMPERATURE: 97.9 F | HEIGHT: 74 IN | RESPIRATION RATE: 17 BRPM | WEIGHT: 208 LBS | HEART RATE: 74 BPM | OXYGEN SATURATION: 99 % | BODY MASS INDEX: 26.69 KG/M2 | DIASTOLIC BLOOD PRESSURE: 96 MMHG | SYSTOLIC BLOOD PRESSURE: 154 MMHG

## 2019-05-02 DIAGNOSIS — I10 ESSENTIAL HYPERTENSION: Primary | ICD-10-CM

## 2019-05-02 DIAGNOSIS — Z11.59 NEED FOR HEPATITIS C SCREENING TEST: ICD-10-CM

## 2019-05-02 DIAGNOSIS — E78.2 MIXED HYPERLIPIDEMIA: ICD-10-CM

## 2019-05-02 PROCEDURE — 99213 OFFICE O/P EST LOW 20 MIN: CPT | Performed by: PHYSICIAN ASSISTANT

## 2019-05-02 RX ORDER — LOSARTAN POTASSIUM 50 MG/1
50 TABLET ORAL DAILY
Qty: 90 TABLET | Refills: 3 | Status: SHIPPED | OUTPATIENT
Start: 2019-05-02 | End: 2020-05-04

## 2019-05-02 NOTE — PROGRESS NOTES
"Subjective   Pj Steele is a 54 y.o. male presents for   Chief Complaint   Patient presents with   • Hyperlipidemia     maint   • Hypertension     maint       History of Present Illness     Pj is a 54-year-old male who presents for hyperlipidemia and hypertension management.  He has lost 2 pounds since November 2, 2018. He has been off of the medication due to a possible recall.    Denied any chest pain,shortness of air,wheezing,swelling of ankles or headaches.  Appetite and sleep has been normal.  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     The following portions of the patient's history were reviewed and updated as appropriate: allergies, current medications, past family history, past medical history, past social history, past surgical history and problem list.    Review of Systems   Constitutional: Negative.    HENT: Negative.    Eyes: Negative.    Respiratory: Negative.  Negative for cough, shortness of breath and wheezing.    Cardiovascular: Negative.  Negative for chest pain, palpitations and leg swelling.   Gastrointestinal: Negative.    Endocrine: Negative.    Genitourinary: Negative.    Musculoskeletal: Negative.    Skin: Negative.    Allergic/Immunologic: Negative.    Neurological: Negative.  Negative for dizziness, light-headedness and headaches.   Hematological: Negative.    Psychiatric/Behavioral: Negative.    All other systems reviewed and are negative.        Vitals:    05/02/19 1453   BP: 154/96   Pulse: 74   Resp: 17   Temp: 97.9 °F (36.6 °C)   SpO2: 99%   Weight: 94.3 kg (208 lb)   Height: 188 cm (74\")     Wt Readings from Last 3 Encounters:   05/02/19 94.3 kg (208 lb)   11/02/18 95.3 kg (210 lb)   04/24/18 90.5 kg (199 lb 9.6 oz)     BP Readings from Last 3 Encounters:   05/02/19 154/96   11/02/18 122/70   04/24/18 139/89     Social History     Socioeconomic History   • Marital status:      Spouse name: Not on file   • Number of children: Not on file   • Years of education: Not on file   • " Highest education level: Not on file   Tobacco Use   • Smoking status: Never Smoker   • Smokeless tobacco: Never Used   Substance and Sexual Activity   • Alcohol use: Yes   • Drug use: Defer   • Sexual activity: Defer     Partners: Female       Allergies   Allergen Reactions   • Statins Myalgia   • Amoxicillin Rash       Body mass index is 26.71 kg/m².    Objective   Physical Exam   Constitutional: He is oriented to person, place, and time. Vital signs are normal. He appears well-developed and well-nourished.   Neck: Trachea normal and phonation normal. Neck supple. Carotid bruit is not present. No thyroid mass and no thyromegaly present.   Cardiovascular: Normal rate, regular rhythm, S1 normal, S2 normal, normal heart sounds and normal pulses.   Pulmonary/Chest: Effort normal and breath sounds normal.   Abdominal: Soft. Normal appearance, normal aorta and bowel sounds are normal. There is no hepatomegaly. There is no tenderness.   Neurological: He is alert and oriented to person, place, and time.   Skin: Skin is warm, dry and intact. Capillary refill takes less than 2 seconds.   Psychiatric: He has a normal mood and affect. His speech is normal and behavior is normal. Judgment and thought content normal. Cognition and memory are normal.       Assessment/Plan   Pj was seen today for hyperlipidemia and hypertension.    Diagnoses and all orders for this visit:    Essential hypertension  -     losartan (COZAAR) 50 MG tablet; Take 1 tablet by mouth Daily.  -     Lipid Panel With LDL / HDL Ratio; Future  -     CBC & Differential; Future  -     Comprehensive Metabolic Panel; Future    Mixed hyperlipidemia  -     Lipid Panel With LDL / HDL Ratio; Future  -     CBC & Differential; Future  -     Comprehensive Metabolic Panel; Future    Need for hepatitis C screening test  -     Hepatitis C antibody; Future      1.  Uncontrolled hypertension: I have rechecked his blood pressure at office visit today and got 130/90 in left  arm.  I have spoken with pharmacist at [facility] and was that his losartan medication was not recalled.  I have refilled his blood pressure medication to pharmacy.  Pj will return to office for blood pressure check in the next 2 weeks.  2.  Chronic hyperlipidemia: I have given him written orders for a CBC, CMP and a lipid profile due to be collected at [facility].  He will be notified of test results when completed.  3.  Need for hepatitis C screening: He will have a hepatitis C screening collected at [facility].  He will be notified of test results when completed.    [facility]  [address] [address]  Dept: [phone]  Dept Fax: [phone]  Loc: [phone]  Loc Fax: [phone]

## 2019-06-14 ENCOUNTER — LAB (OUTPATIENT)
Dept: LAB | Facility: HOSPITAL | Age: 54
End: 2019-06-14

## 2019-06-14 ENCOUNTER — TRANSCRIBE ORDERS (OUTPATIENT)
Dept: ADMINISTRATIVE | Facility: HOSPITAL | Age: 54
End: 2019-06-14

## 2019-06-14 DIAGNOSIS — I10 HYPERTENSION, ESSENTIAL: Primary | ICD-10-CM

## 2019-06-14 DIAGNOSIS — Z11.59 NEED FOR HEPATITIS C SCREENING TEST: ICD-10-CM

## 2019-06-14 DIAGNOSIS — I10 HYPERTENSION, ESSENTIAL: ICD-10-CM

## 2019-06-14 DIAGNOSIS — E78.5 HYPERLIPIDEMIA, UNSPECIFIED HYPERLIPIDEMIA TYPE: ICD-10-CM

## 2019-06-14 LAB
ALBUMIN SERPL-MCNC: 4.5 G/DL (ref 3.5–5.2)
ALBUMIN/GLOB SERPL: 1.9 G/DL
ALP SERPL-CCNC: 52 U/L (ref 39–117)
ALT SERPL W P-5'-P-CCNC: 18 U/L (ref 1–41)
ANION GAP SERPL CALCULATED.3IONS-SCNC: 11.1 MMOL/L
AST SERPL-CCNC: 12 U/L (ref 1–40)
BASOPHILS # BLD AUTO: 0.05 10*3/MM3 (ref 0–0.2)
BASOPHILS NFR BLD AUTO: 0.8 % (ref 0–1.5)
BILIRUB SERPL-MCNC: 0.9 MG/DL (ref 0.2–1.2)
BUN BLD-MCNC: 11 MG/DL (ref 6–20)
BUN/CREAT SERPL: 11.2 (ref 7–25)
CALCIUM SPEC-SCNC: 9.5 MG/DL (ref 8.6–10.5)
CHLORIDE SERPL-SCNC: 103 MMOL/L (ref 98–107)
CHOLEST SERPL-MCNC: 198 MG/DL (ref 0–200)
CO2 SERPL-SCNC: 27.9 MMOL/L (ref 22–29)
CREAT BLD-MCNC: 0.98 MG/DL (ref 0.76–1.27)
DEPRECATED RDW RBC AUTO: 41.1 FL (ref 37–54)
EOSINOPHIL # BLD AUTO: 0.18 10*3/MM3 (ref 0–0.4)
EOSINOPHIL NFR BLD AUTO: 2.8 % (ref 0.3–6.2)
ERYTHROCYTE [DISTWIDTH] IN BLOOD BY AUTOMATED COUNT: 12.6 % (ref 12.3–15.4)
GFR SERPL CREATININE-BSD FRML MDRD: 80 ML/MIN/1.73
GLOBULIN UR ELPH-MCNC: 2.4 GM/DL
GLUCOSE BLD-MCNC: 93 MG/DL (ref 65–99)
HAV IGM SERPL QL IA: NORMAL
HBV CORE IGM SERPL QL IA: NORMAL
HBV SURFACE AG SERPL QL IA: NORMAL
HCT VFR BLD AUTO: 48.4 % (ref 37.5–51)
HCV AB SER DONR QL: NORMAL
HDLC SERPL-MCNC: 41 MG/DL (ref 40–60)
HGB BLD-MCNC: 15.7 G/DL (ref 13–17.7)
IMM GRANULOCYTES # BLD AUTO: 0.03 10*3/MM3 (ref 0–0.05)
IMM GRANULOCYTES NFR BLD AUTO: 0.5 % (ref 0–0.5)
LDLC SERPL CALC-MCNC: 129 MG/DL (ref 0–100)
LDLC/HDLC SERPL: 3.14 {RATIO}
LYMPHOCYTES # BLD AUTO: 1.11 10*3/MM3 (ref 0.7–3.1)
LYMPHOCYTES NFR BLD AUTO: 17.5 % (ref 19.6–45.3)
MCH RBC QN AUTO: 28.6 PG (ref 26.6–33)
MCHC RBC AUTO-ENTMCNC: 32.4 G/DL (ref 31.5–35.7)
MCV RBC AUTO: 88.3 FL (ref 79–97)
MONOCYTES # BLD AUTO: 0.57 10*3/MM3 (ref 0.1–0.9)
MONOCYTES NFR BLD AUTO: 9 % (ref 5–12)
NEUTROPHILS # BLD AUTO: 4.41 10*3/MM3 (ref 1.7–7)
NEUTROPHILS NFR BLD AUTO: 69.4 % (ref 42.7–76)
NRBC BLD AUTO-RTO: 0 /100 WBC (ref 0–0.2)
PLATELET # BLD AUTO: 269 10*3/MM3 (ref 140–450)
PMV BLD AUTO: 9.6 FL (ref 6–12)
POTASSIUM BLD-SCNC: 4.6 MMOL/L (ref 3.5–5.2)
PROT SERPL-MCNC: 6.9 G/DL (ref 6–8.5)
RBC # BLD AUTO: 5.48 10*6/MM3 (ref 4.14–5.8)
SODIUM BLD-SCNC: 142 MMOL/L (ref 136–145)
TRIGL SERPL-MCNC: 142 MG/DL (ref 0–150)
VLDLC SERPL-MCNC: 28.4 MG/DL (ref 5–40)
WBC NRBC COR # BLD: 6.35 10*3/MM3 (ref 3.4–10.8)

## 2019-06-14 PROCEDURE — 36415 COLL VENOUS BLD VENIPUNCTURE: CPT

## 2019-06-14 PROCEDURE — 80053 COMPREHEN METABOLIC PANEL: CPT

## 2019-06-14 PROCEDURE — 80074 ACUTE HEPATITIS PANEL: CPT

## 2019-06-14 PROCEDURE — 85025 COMPLETE CBC W/AUTO DIFF WBC: CPT

## 2019-06-14 PROCEDURE — 80061 LIPID PANEL: CPT

## 2019-12-04 PROBLEM — C44.629 SQUAMOUS CELL CARCINOMA OF SKIN OF LEFT UPPER EXTREMITY, INCLUDING SHOULDER: Status: ACTIVE | Noted: 2019-12-04

## 2020-01-15 PROBLEM — C44.41 BASAL CELL CARCINOMA (BCC) OF SKIN OF NECK: Status: ACTIVE | Noted: 2020-01-15

## 2020-05-03 DIAGNOSIS — I10 ESSENTIAL HYPERTENSION: ICD-10-CM

## 2020-05-04 RX ORDER — LOSARTAN POTASSIUM 50 MG/1
50 TABLET ORAL DAILY
Qty: 30 TABLET | Refills: 0 | Status: SHIPPED | OUTPATIENT
Start: 2020-05-04 | End: 2020-05-07 | Stop reason: DRUGHIGH

## 2020-05-07 ENCOUNTER — OFFICE VISIT (OUTPATIENT)
Dept: FAMILY MEDICINE CLINIC | Facility: CLINIC | Age: 55
End: 2020-05-07

## 2020-05-07 VITALS
TEMPERATURE: 97.9 F | BODY MASS INDEX: 26.56 KG/M2 | HEIGHT: 74 IN | WEIGHT: 207 LBS | RESPIRATION RATE: 16 BRPM | DIASTOLIC BLOOD PRESSURE: 82 MMHG | OXYGEN SATURATION: 99 % | SYSTOLIC BLOOD PRESSURE: 140 MMHG | HEART RATE: 68 BPM

## 2020-05-07 DIAGNOSIS — E78.2 MIXED HYPERLIPIDEMIA: ICD-10-CM

## 2020-05-07 DIAGNOSIS — Z12.5 SCREENING FOR PROSTATE CANCER: ICD-10-CM

## 2020-05-07 DIAGNOSIS — I10 ESSENTIAL HYPERTENSION: Primary | ICD-10-CM

## 2020-05-07 DIAGNOSIS — Z23 NEED FOR TETANUS, DIPHTHERIA, AND ACELLULAR PERTUSSIS (TDAP) VACCINE IN PATIENT OF ADOLESCENT AGE OR OLDER: ICD-10-CM

## 2020-05-07 PROCEDURE — 90471 IMMUNIZATION ADMIN: CPT | Performed by: PHYSICIAN ASSISTANT

## 2020-05-07 PROCEDURE — 99214 OFFICE O/P EST MOD 30 MIN: CPT | Performed by: PHYSICIAN ASSISTANT

## 2020-05-07 PROCEDURE — 90715 TDAP VACCINE 7 YRS/> IM: CPT | Performed by: PHYSICIAN ASSISTANT

## 2020-05-07 RX ORDER — LOSARTAN POTASSIUM 100 MG/1
100 TABLET ORAL DAILY
Qty: 90 TABLET | Refills: 0 | Status: SHIPPED | OUTPATIENT
Start: 2020-05-07 | End: 2020-06-22

## 2020-05-07 NOTE — PROGRESS NOTES
Subjective   Pj Steele is a 55 y.o. male presents for   Chief Complaint   Patient presents with   • Hypertension     Management   • Hyperlipidemia     Management       History of Present Illness     Pj is a 55-year-old male who presents for hypertension and hypercholesterolemia management.  He has lost 1 pound since May 2, 2019 office visit.  Pj states he is fasting this morning and would like lab work collected..  He has not been keeping a blood pressure log at home.  States his diet has been healthy.  Sleep has been restless at times.  States he has had a little bit of stress working on remodeling a bathroom at home and the current pandemic issues.  States he does not want or need anything for anxiety at this time.  Denied any suicidal homicidal ideation.  Exercising has been work related either at work or at home.  Denied any chest pain, shortness of air, dizziness, vision changes, headaches, fevers, chills, swelling of ankles or GI upset.  Bowel movements have been daily without dark black tarry stools.  States he has had 2 shingles shots at the [facility] pharmacy.  Has no complaints today.    The following portions of the patient's history were reviewed and updated as appropriate: allergies, current medications, past family history, past medical history, past social history, past surgical history and problem list.    Review of Systems   Constitutional: Negative.  Negative for chills, fatigue and fever.   HENT: Negative.    Eyes: Negative.    Respiratory: Negative for cough, chest tightness, shortness of breath and wheezing.    Cardiovascular: Negative.  Negative for chest pain, palpitations and leg swelling.   Gastrointestinal: Negative.  Negative for abdominal pain, constipation, diarrhea, nausea and vomiting.   Endocrine: Negative.    Genitourinary: Negative.    Musculoskeletal: Negative.    Skin: Negative.    Allergic/Immunologic: Negative.    Neurological: Negative.  Negative for dizziness,  "light-headedness and headaches.   Hematological: Negative.    Psychiatric/Behavioral: Negative for sleep disturbance. The patient is nervous/anxious.          Vitals:    05/07/20 0825   BP: 140/82   BP Location: Right arm   Patient Position: Sitting   Cuff Size: Adult   Pulse: 68   Resp: 16   Temp: 97.9 °F (36.6 °C)   SpO2: 99%   Weight: 93.9 kg (207 lb)   Height: 188 cm (74\")     Wt Readings from Last 3 Encounters:   05/07/20 93.9 kg (207 lb)   05/02/19 94.3 kg (208 lb)   11/02/18 95.3 kg (210 lb)     BP Readings from Last 3 Encounters:   05/07/20 140/82   05/02/19 154/96   11/02/18 122/70     Social History     Socioeconomic History   • Marital status:      Spouse name: Not on file   • Number of children: Not on file   • Years of education: Not on file   • Highest education level: Not on file   Tobacco Use   • Smoking status: Never Smoker   • Smokeless tobacco: Never Used   Substance and Sexual Activity   • Alcohol use: Yes   • Drug use: Defer   • Sexual activity: Defer     Partners: Female       Allergies   Allergen Reactions   • Statins Myalgia   • Amoxicillin Rash       Body mass index is 26.58 kg/m².    Objective   Physical Exam   Constitutional: He is oriented to person, place, and time. Vital signs are normal. He appears well-developed and well-nourished.   Overweight male wearing a rooster facial mask   HENT:   Head: Normocephalic and atraumatic.   Neck: Trachea normal and phonation normal. Neck supple. Normal carotid pulses, no hepatojugular reflux and no JVD present. No tracheal tenderness present. Carotid bruit is not present. No tracheal deviation and no edema present. No thyroid mass and no thyromegaly present.   Cardiovascular: Normal rate, regular rhythm, S1 normal, S2 normal, normal heart sounds and normal pulses.   No murmur heard.  Pulmonary/Chest: Effort normal and breath sounds normal.   Abdominal: Soft. Normal appearance, normal aorta and bowel sounds are normal. There is no " hepatomegaly. There is no tenderness.   Neurological: He is alert and oriented to person, place, and time.   Skin: Skin is warm, dry and intact. Capillary refill takes less than 2 seconds.   Psychiatric: He has a normal mood and affect. His speech is normal and behavior is normal. Judgment and thought content normal. Cognition and memory are normal.       Assessment/Plan   Pj was seen today for hypertension and hyperlipidemia.    Diagnoses and all orders for this visit:    Essential hypertension  -     losartan (Cozaar) 100 MG tablet; Take 1 tablet by mouth Daily.  -     CBC & Differential  -     Comprehensive Metabolic Panel  -     Lipid Panel With LDL / HDL Ratio    Mixed hyperlipidemia  -     CBC & Differential  -     Comprehensive Metabolic Panel  -     Lipid Panel With LDL / HDL Ratio    Screening for prostate cancer  -     PSA Screen    Need for tetanus, diphtheria, and acellular pertussis (Tdap) vaccine in patient of adolescent age or older  -     Tdap Vaccine Greater Than or Equal To 6yo IM    1.  Chronic and uncontrolled hypertension: I have rechecked his blood pressure at office visit today and got 160/88 in left arm.  We will increase his losartan to 100 mg daily.  New prescription was sent to pharmacy.  I have asked Pj to keep a blood pressure log and update status in 2 weeks.  Plan to return to office in 3 months for annual physical examination with recheck on blood pressure.  2.  Chronic and stable hyperlipidemia: He is fasting will have a CBC, CMP and a lipid profile today.  Pj will be notified of test results and any medication changes.  Encouraged him to eat healthy and increase physical activity.  3.  Need for screening prostate cancer: He would like to have his PSA checked.  Will add this to his fasting blood work above.  Will be notified of test results when completed.  Return to office in 3 months for physical examination.  4.  Need for updated Tdap vaccination: Pj has given written  consent to receive updated Tdap vaccination today.      [facility]  [address] [address]  Dept: [phone]  Dept Fax: [phone]  Loc: [phone]  Loc Fax: [phone]

## 2020-05-08 LAB
ALBUMIN SERPL-MCNC: 4.7 G/DL (ref 3.5–5.2)
ALBUMIN/GLOB SERPL: 2.4 G/DL
ALP SERPL-CCNC: 52 U/L (ref 39–117)
ALT SERPL-CCNC: 22 U/L (ref 1–41)
AST SERPL-CCNC: 15 U/L (ref 1–40)
BASOPHILS # BLD AUTO: 0.04 10*3/MM3 (ref 0–0.2)
BASOPHILS NFR BLD AUTO: 0.6 % (ref 0–1.5)
BILIRUB SERPL-MCNC: 1.2 MG/DL (ref 0.2–1.2)
BUN SERPL-MCNC: 13 MG/DL (ref 6–20)
BUN/CREAT SERPL: 14.4 (ref 7–25)
CALCIUM SERPL-MCNC: 9.4 MG/DL (ref 8.6–10.5)
CHLORIDE SERPL-SCNC: 101 MMOL/L (ref 98–107)
CHOLEST SERPL-MCNC: 208 MG/DL (ref 0–200)
CO2 SERPL-SCNC: 28 MMOL/L (ref 22–29)
CREAT SERPL-MCNC: 0.9 MG/DL (ref 0.76–1.27)
EOSINOPHIL # BLD AUTO: 0.15 10*3/MM3 (ref 0–0.4)
EOSINOPHIL NFR BLD AUTO: 2.3 % (ref 0.3–6.2)
ERYTHROCYTE [DISTWIDTH] IN BLOOD BY AUTOMATED COUNT: 12.7 % (ref 12.3–15.4)
GLOBULIN SER CALC-MCNC: 2 GM/DL
GLUCOSE SERPL-MCNC: 91 MG/DL (ref 65–99)
HCT VFR BLD AUTO: 44.1 % (ref 37.5–51)
HDLC SERPL-MCNC: 43 MG/DL (ref 40–60)
HGB BLD-MCNC: 14.7 G/DL (ref 13–17.7)
IMM GRANULOCYTES # BLD AUTO: 0.01 10*3/MM3 (ref 0–0.05)
IMM GRANULOCYTES NFR BLD AUTO: 0.2 % (ref 0–0.5)
LDLC SERPL CALC-MCNC: 142 MG/DL (ref 0–100)
LDLC/HDLC SERPL: 3.3 {RATIO}
LYMPHOCYTES # BLD AUTO: 1.3 10*3/MM3 (ref 0.7–3.1)
LYMPHOCYTES NFR BLD AUTO: 20.1 % (ref 19.6–45.3)
MCH RBC QN AUTO: 28.8 PG (ref 26.6–33)
MCHC RBC AUTO-ENTMCNC: 33.3 G/DL (ref 31.5–35.7)
MCV RBC AUTO: 86.3 FL (ref 79–97)
MONOCYTES # BLD AUTO: 0.57 10*3/MM3 (ref 0.1–0.9)
MONOCYTES NFR BLD AUTO: 8.8 % (ref 5–12)
NEUTROPHILS # BLD AUTO: 4.41 10*3/MM3 (ref 1.7–7)
NEUTROPHILS NFR BLD AUTO: 68 % (ref 42.7–76)
NRBC BLD AUTO-RTO: 0 /100 WBC (ref 0–0.2)
PLATELET # BLD AUTO: 273 10*3/MM3 (ref 140–450)
POTASSIUM SERPL-SCNC: 4.5 MMOL/L (ref 3.5–5.2)
PROT SERPL-MCNC: 6.7 G/DL (ref 6–8.5)
PSA SERPL-MCNC: 0.79 NG/ML (ref 0–4)
RBC # BLD AUTO: 5.11 10*6/MM3 (ref 4.14–5.8)
SODIUM SERPL-SCNC: 141 MMOL/L (ref 136–145)
TRIGL SERPL-MCNC: 116 MG/DL (ref 0–150)
VLDLC SERPL CALC-MCNC: 23.2 MG/DL (ref 5–40)
WBC # BLD AUTO: 6.48 10*3/MM3 (ref 3.4–10.8)

## 2020-06-22 ENCOUNTER — OFFICE VISIT (OUTPATIENT)
Dept: FAMILY MEDICINE CLINIC | Facility: CLINIC | Age: 55
End: 2020-06-22

## 2020-06-22 VITALS
OXYGEN SATURATION: 99 % | RESPIRATION RATE: 16 BRPM | TEMPERATURE: 97.4 F | WEIGHT: 202 LBS | DIASTOLIC BLOOD PRESSURE: 78 MMHG | HEART RATE: 76 BPM | BODY MASS INDEX: 25.93 KG/M2 | SYSTOLIC BLOOD PRESSURE: 130 MMHG | HEIGHT: 74 IN

## 2020-06-22 DIAGNOSIS — I10 ESSENTIAL HYPERTENSION: Primary | ICD-10-CM

## 2020-06-22 PROCEDURE — 99213 OFFICE O/P EST LOW 20 MIN: CPT | Performed by: PHYSICIAN ASSISTANT

## 2020-06-22 RX ORDER — LOSARTAN POTASSIUM 100 MG/1
100 TABLET ORAL DAILY
Qty: 90 TABLET | Refills: 3 | Status: SHIPPED | OUTPATIENT
Start: 2020-06-22 | End: 2020-10-19

## 2020-06-22 NOTE — PROGRESS NOTES
"Subjective   Pj Steele is a 55 y.o. male presents for   Chief Complaint   Patient presents with   • Hypertension     management       History of Present Illness     Pj is a 55-year-old male who presents for hypertension management.  He took his last losartan 100 mg medication this morning.  States he has been checking his blood pressure off and on has been getting around 140 over high 70s.  Denied any chest pain, shortness of air, dizziness, vision changes or swelling of ankles.  Diet has not been as healthy.  Sleep has been normal.  He is active by doing yard work.    The following portions of the patient's history were reviewed and updated as appropriate: allergies, current medications, past family history, past medical history, past social history, past surgical history and problem list.    Review of Systems   Constitutional: Negative.    HENT: Negative.    Eyes: Negative.    Respiratory: Negative.  Negative for cough, chest tightness, shortness of breath and wheezing.    Cardiovascular: Negative.  Negative for chest pain, palpitations and leg swelling.   Gastrointestinal: Negative.    Endocrine: Negative.    Genitourinary: Negative.    Musculoskeletal: Negative.    Skin: Negative.    Allergic/Immunologic: Negative.    Neurological: Negative.  Negative for dizziness, light-headedness and headaches.   Hematological: Negative.    Psychiatric/Behavioral: Negative.  Negative for sleep disturbance.   All other systems reviewed and are negative.        Vitals:    06/22/20 1456 06/22/20 1529   BP: 142/88 130/78   BP Location:  Left arm   Patient Position:  Sitting   Cuff Size:  Adult   Pulse: 76    Resp: 16    Temp: 97.4 °F (36.3 °C)    SpO2: 99%    Weight: 91.6 kg (202 lb)    Height: 188 cm (74\")      Wt Readings from Last 3 Encounters:   06/22/20 91.6 kg (202 lb)   05/07/20 93.9 kg (207 lb)   05/02/19 94.3 kg (208 lb)     BP Readings from Last 3 Encounters:   06/22/20 130/78   05/07/20 140/82   05/02/19 154/96 "     Social History     Socioeconomic History   • Marital status:      Spouse name: Not on file   • Number of children: Not on file   • Years of education: Not on file   • Highest education level: Not on file   Tobacco Use   • Smoking status: Never Smoker   • Smokeless tobacco: Never Used   Substance and Sexual Activity   • Alcohol use: Yes   • Drug use: Defer   • Sexual activity: Defer     Partners: Female       Allergies   Allergen Reactions   • Statins Myalgia   • Amoxicillin Rash       Body mass index is 25.94 kg/m².    Objective   Physical Exam   Constitutional: He is oriented to person, place, and time. Vital signs are normal. He appears well-developed and well-nourished. Face mask in place.   Sitting on exam table wearing a fabric facial mask   HENT:   Head: Normocephalic and atraumatic.   Neck: Phonation normal. Neck supple. Normal carotid pulses, no hepatojugular reflux and no JVD present. No tracheal tenderness present. Carotid bruit is not present. No tracheal deviation and no edema present. No thyroid mass and no thyromegaly present.   Cardiovascular: Normal rate, regular rhythm, S1 normal, S2 normal, normal heart sounds and normal pulses.   No murmur heard.  Pulmonary/Chest: Effort normal and breath sounds normal.   Abdominal: Soft. Normal appearance, normal aorta and bowel sounds are normal. There is no hepatomegaly. There is no tenderness.   Neurological: He is alert and oriented to person, place, and time.   Skin: Skin is warm, dry and intact. Capillary refill takes less than 2 seconds.   Psychiatric: He has a normal mood and affect. His speech is normal and behavior is normal. Judgment and thought content normal. Cognition and memory are normal.      I was wearing surgical mask during the entire office visit encounter.    Assessment/Plan   Pj was seen today for hypertension.    Diagnoses and all orders for this visit:    Essential hypertension  -     losartan (Cozaar) 100 MG tablet; Take 1  tablet by mouth Daily.      Mr. Pj Steele was seen in office today for hypertension management.  I have rechecked blood pressure at office visit today and got 130/78 in left arm using a large blood pressure cuff.  We will continue losartan 100 mg daily.  New prescription was sent to pharmacy.  Encouraged to decrease salt and sodium intake and drink more water.  Will reevaluate blood pressure with annual physical examination in October 2020.  Pj voiced understanding.    [facility]  [address] [place] [id number]  Dept: [phone]  Dept Fax: [phone]  Loc: [phone]  Loc Fax: [phone]           Answers for HPI/ROS submitted by the patient on 6/15/2020   Hypertension  What is the primary reason for your visit?: High Blood Pressure

## 2020-10-19 DIAGNOSIS — I10 ESSENTIAL HYPERTENSION: ICD-10-CM

## 2020-10-19 RX ORDER — LOSARTAN POTASSIUM 100 MG/1
TABLET ORAL
Qty: 30 TABLET | Refills: 6 | Status: SHIPPED | OUTPATIENT
Start: 2020-10-19 | End: 2021-10-20

## 2020-11-23 DIAGNOSIS — Z00.00 ANNUAL PHYSICAL EXAM: ICD-10-CM

## 2020-11-23 DIAGNOSIS — I10 ESSENTIAL HYPERTENSION: Primary | ICD-10-CM

## 2020-11-23 DIAGNOSIS — Z12.5 SCREENING FOR PROSTATE CANCER: ICD-10-CM

## 2020-11-23 DIAGNOSIS — E78.00 HYPERCHOLESTEROLEMIA: ICD-10-CM

## 2020-11-23 DIAGNOSIS — E78.2 MIXED HYPERLIPIDEMIA: ICD-10-CM

## 2020-11-25 LAB
ALBUMIN SERPL-MCNC: 4.6 G/DL (ref 3.5–5.2)
ALBUMIN/GLOB SERPL: 2.7 G/DL
ALP SERPL-CCNC: 56 U/L (ref 39–117)
ALT SERPL-CCNC: 19 U/L (ref 1–41)
AST SERPL-CCNC: 11 U/L (ref 1–40)
BASOPHILS # BLD AUTO: 0.05 10*3/MM3 (ref 0–0.2)
BASOPHILS NFR BLD AUTO: 0.8 % (ref 0–1.5)
BILIRUB SERPL-MCNC: 1.1 MG/DL (ref 0–1.2)
BUN SERPL-MCNC: 12 MG/DL (ref 6–20)
BUN/CREAT SERPL: 12.2 (ref 7–25)
CALCIUM SERPL-MCNC: 9.4 MG/DL (ref 8.6–10.5)
CHLORIDE SERPL-SCNC: 103 MMOL/L (ref 98–107)
CHOLEST SERPL-MCNC: 219 MG/DL (ref 0–200)
CHOLEST/HDLC SERPL: 4.66 {RATIO}
CO2 SERPL-SCNC: 30.9 MMOL/L (ref 22–29)
CREAT SERPL-MCNC: 0.98 MG/DL (ref 0.76–1.27)
EOSINOPHIL # BLD AUTO: 0.1 10*3/MM3 (ref 0–0.4)
EOSINOPHIL NFR BLD AUTO: 1.7 % (ref 0.3–6.2)
ERYTHROCYTE [DISTWIDTH] IN BLOOD BY AUTOMATED COUNT: 12.9 % (ref 12.3–15.4)
GLOBULIN SER CALC-MCNC: 1.7 GM/DL
GLUCOSE SERPL-MCNC: 84 MG/DL (ref 65–99)
HCT VFR BLD AUTO: 46.1 % (ref 37.5–51)
HDLC SERPL-MCNC: 47 MG/DL (ref 40–60)
HGB BLD-MCNC: 15.5 G/DL (ref 13–17.7)
IMM GRANULOCYTES # BLD AUTO: 0.02 10*3/MM3 (ref 0–0.05)
IMM GRANULOCYTES NFR BLD AUTO: 0.3 % (ref 0–0.5)
LDLC SERPL CALC-MCNC: 150 MG/DL (ref 0–100)
LYMPHOCYTES # BLD AUTO: 1.21 10*3/MM3 (ref 0.7–3.1)
LYMPHOCYTES NFR BLD AUTO: 20.1 % (ref 19.6–45.3)
MCH RBC QN AUTO: 28.9 PG (ref 26.6–33)
MCHC RBC AUTO-ENTMCNC: 33.6 G/DL (ref 31.5–35.7)
MCV RBC AUTO: 86 FL (ref 79–97)
MONOCYTES # BLD AUTO: 0.48 10*3/MM3 (ref 0.1–0.9)
MONOCYTES NFR BLD AUTO: 8 % (ref 5–12)
NEUTROPHILS # BLD AUTO: 4.15 10*3/MM3 (ref 1.7–7)
NEUTROPHILS NFR BLD AUTO: 69.1 % (ref 42.7–76)
NRBC BLD AUTO-RTO: 0 /100 WBC (ref 0–0.2)
PLATELET # BLD AUTO: 269 10*3/MM3 (ref 140–450)
POTASSIUM SERPL-SCNC: 4.6 MMOL/L (ref 3.5–5.2)
PROT SERPL-MCNC: 6.3 G/DL (ref 6–8.5)
RBC # BLD AUTO: 5.36 10*6/MM3 (ref 4.14–5.8)
SODIUM SERPL-SCNC: 141 MMOL/L (ref 136–145)
TRIGL SERPL-MCNC: 124 MG/DL (ref 0–150)
VLDLC SERPL CALC-MCNC: 22 MG/DL (ref 5–40)
WBC # BLD AUTO: 6.01 10*3/MM3 (ref 3.4–10.8)

## 2020-12-02 ENCOUNTER — OFFICE VISIT (OUTPATIENT)
Dept: FAMILY MEDICINE CLINIC | Facility: CLINIC | Age: 55
End: 2020-12-02

## 2020-12-02 VITALS
DIASTOLIC BLOOD PRESSURE: 82 MMHG | BODY MASS INDEX: 26.18 KG/M2 | SYSTOLIC BLOOD PRESSURE: 138 MMHG | TEMPERATURE: 97.3 F | HEART RATE: 65 BPM | WEIGHT: 204 LBS | HEIGHT: 74 IN | OXYGEN SATURATION: 99 % | RESPIRATION RATE: 16 BRPM

## 2020-12-02 DIAGNOSIS — I10 ESSENTIAL HYPERTENSION: ICD-10-CM

## 2020-12-02 DIAGNOSIS — E78.2 MIXED HYPERLIPIDEMIA: ICD-10-CM

## 2020-12-02 DIAGNOSIS — Z00.00 ENCOUNTER FOR ANNUAL PHYSICAL EXAM: Primary | ICD-10-CM

## 2020-12-02 DIAGNOSIS — F41.1 GAD (GENERALIZED ANXIETY DISORDER): ICD-10-CM

## 2020-12-02 PROCEDURE — 99396 PREV VISIT EST AGE 40-64: CPT | Performed by: PHYSICIAN ASSISTANT

## 2020-12-02 RX ORDER — FLUOXETINE 10 MG/1
10 CAPSULE ORAL DAILY
Qty: 30 CAPSULE | Refills: 1 | Status: SHIPPED | OUTPATIENT
Start: 2020-12-02 | End: 2020-12-30 | Stop reason: DRUGHIGH

## 2020-12-02 NOTE — PROGRESS NOTES
"Subjective   Pj Steele is a 55 y.o. male presents for   Chief Complaint   Patient presents with   • Annual Exam   • Hypertension     Management   • Hyperlipidemia     Management       History of Present Illness     Pj is a 55 year old male who presents for annual physical examination, hypertension and hyperlipidemia management.  States overall has been feeling good except he has been feeling more anxious and short tempered.  Pj states little things get him very nervous and anxious.  States he has had issues dealing with the pandemic and his wife's plasma cell leukemia.  States he has been getting tired of \"stupid people\".  Pj states he is worried about getting Covid exposure and given it to his wife.  He has tried citalopram in past for stress and anxiety but did not see any improvement.  Has good support with his wife.  Denied any suicidal or homicidal ideation.  Sleep has been normal to restless.  He has not been as physically active except at work.  Diet has been so-so healthy.  Bowel movements have been daily without dark black tarry stools.  Denied any fevers, chills, upper respiratory symptoms, chest pain, shortness of air, cough, wheezing, headache, vision changes, swelling of ankles, urinary symptoms, penile discharge or erectile dysfunction.    The following portions of the patient's history were reviewed and updated as appropriate: allergies, current medications, past family history, past medical history, past social history, past surgical history and problem list.    Review of Systems   Constitutional: Negative.    HENT: Negative.    Eyes: Negative.    Respiratory: Negative.  Negative for cough, chest tightness, shortness of breath and wheezing.    Cardiovascular: Negative.  Negative for chest pain, palpitations and leg swelling.   Gastrointestinal: Negative.  Negative for abdominal distention, abdominal pain, anal bleeding, blood in stool, constipation, diarrhea, nausea, rectal pain and " "vomiting.   Endocrine: Negative.    Genitourinary: Negative.    Musculoskeletal: Negative.    Skin: Negative.    Allergic/Immunologic: Negative.    Neurological: Negative.    Hematological: Negative.    Psychiatric/Behavioral: Positive for sleep disturbance. Negative for suicidal ideas. The patient is nervous/anxious.    All other systems reviewed and are negative.        Vitals:    12/02/20 1447 12/02/20 1510   BP: 148/98 138/82   BP Location:  Left arm   Patient Position:  Sitting   Cuff Size:  Adult   Pulse: 65    Resp: 16    Temp: 97.3 °F (36.3 °C)    SpO2: 99%    Weight: 92.5 kg (204 lb)    Height: 188 cm (74\")      Wt Readings from Last 3 Encounters:   12/02/20 92.5 kg (204 lb)   06/22/20 91.6 kg (202 lb)   05/07/20 93.9 kg (207 lb)     BP Readings from Last 3 Encounters:   12/02/20 138/82   06/22/20 130/78   05/07/20 140/82     Social History     Socioeconomic History   • Marital status:      Spouse name: Not on file   • Number of children: Not on file   • Years of education: Not on file   • Highest education level: Not on file   Tobacco Use   • Smoking status: Never Smoker   • Smokeless tobacco: Never Used   Substance and Sexual Activity   • Alcohol use: Yes   • Drug use: Defer   • Sexual activity: Defer     Partners: Female       Allergies   Allergen Reactions   • Statins Myalgia   • Amoxicillin Rash       Body mass index is 26.19 kg/m².    Objective   Physical Exam  Constitutional:       Appearance: Normal appearance. He is well-developed, well-groomed and overweight.      Interventions: Face mask in place.   HENT:      Head: Normocephalic and atraumatic.      Jaw: There is normal jaw occlusion.      Right Ear: Hearing, tympanic membrane, ear canal and external ear normal.      Left Ear: Hearing, tympanic membrane, ear canal and external ear normal.      Nose: Nose normal.      Right Sinus: No maxillary sinus tenderness or frontal sinus tenderness.      Left Sinus: No maxillary sinus tenderness or " frontal sinus tenderness.      Mouth/Throat:      Lips: Pink.      Mouth: Mucous membranes are moist.      Tongue: No lesions.      Palate: No mass.   Eyes:      General: Lids are normal.      Conjunctiva/sclera: Conjunctivae normal.      Pupils: Pupils are equal, round, and reactive to light.   Neck:      Musculoskeletal: Neck supple.      Thyroid: No thyroid mass, thyromegaly or thyroid tenderness.      Trachea: Trachea and phonation normal. No tracheal tenderness.   Cardiovascular:      Rate and Rhythm: Normal rate and regular rhythm.      Pulses: Normal pulses.      Heart sounds: Normal heart sounds, S1 normal and S2 normal. No murmur.   Pulmonary:      Effort: Pulmonary effort is normal.      Breath sounds: Normal breath sounds and air entry.   Abdominal:      General: Bowel sounds are normal.      Palpations: Abdomen is soft. There is no hepatomegaly.      Tenderness: There is no abdominal tenderness.   Musculoskeletal:      Right lower leg: No edema.      Left lower leg: No edema.   Lymphadenopathy:      Cervical: No cervical adenopathy.      Right cervical: No superficial, deep or posterior cervical adenopathy.     Left cervical: No superficial, deep or posterior cervical adenopathy.   Skin:     General: Skin is warm and dry.      Capillary Refill: Capillary refill takes less than 2 seconds.   Neurological:      Mental Status: He is alert and oriented to person, place, and time.      Deep Tendon Reflexes: Reflexes are normal and symmetric.      Reflex Scores:       Patellar reflexes are 2+ on the right side and 2+ on the left side.  Psychiatric:         Attention and Perception: Attention and perception normal.         Mood and Affect: Affect normal. Mood is anxious.         Speech: Speech normal.         Behavior: Behavior normal. Behavior is cooperative.         Thought Content: Thought content normal.         Cognition and Memory: Cognition and memory normal.         Judgment: Judgment normal.     I was  wearing a surgical mask and face shield during the entire office visit/encounter.      Assessment/Plan   Diagnoses and all orders for this visit:    1. Encounter for annual physical exam (Primary)    2. Mixed hyperlipidemia    3. Essential hypertension    4. ANYI (generalized anxiety disorder)  -     FLUoxetine (PROzac) 10 MG capsule; Take 1 capsule by mouth Daily.  Dispense: 30 capsule; Refill: 1    1.  Annual physical examination with hyperlipidemia: I have reviewed his lab work that was collected on November 25, 2020 with him at office visit today.  Fasting blood sugar was 84, cholesterol 219, triglycerides 124, HDL 47, and . These readings have increased slightly over the past 6 months.  Stressed the importance of decreasing his sugar and carbohydrates in diet.  Also encouraged increased physical activity.  We will recheck fasting lab work in 6 months.  2.  Chronic and stable hypertension: I have rechecked his blood pressure at office visit today and got 138/82 in left arm.  He will continue his losartan 100 mg medication at home as directed.  I will recheck his blood pressure with office visit in 6 months.  3.  New generalized anxiety disorder: I will start him on fluoxetine 10 mg daily.  He will return to office in the next 2-3 weeks for reevaluation.  I have encouraged him to try to exercise to help with his emotional issues as well.      Patient Counseling:  --Nutrition: Stressed importance of moderation in sodium/caffeine intake, saturated fat and cholesterol.  Discussed caloric balance, sufficient intake of fresh fruits, vegetables, fiber,   calcium, iron.  --Discussed the new recommendation against daily use of baby aspirin for primary prevention in low risk patients.  --Exercise: Stressed the importance of regular exercise by incorporating into daily routine.    --Substance Abuse: Discussed cessation/primary prevention of tobacco, alcohol, or other drug use; driving or other dangerous activities  under the influence.    --Dental health: Discussed importance of regular tooth brushing, flossing, and dental visits.  -- Suggested having eyes and vision checked if needed or past due.  --Immunizations reviewed and up to date.  --Discussed benefits of screening colonoscopy.  Colonoscopy is current.      [facility]  [address]  [facility] [address]  Dept: [phone]  Dept Fax: [phone]  Loc: [phone]  Loc Fax: [phone]           Orders Only on 11/23/2020   Component Date Value Ref Range Status   • Glucose 11/25/2020 84  65 - 99 mg/dL Final   • BUN 11/25/2020 12  6 - 20 mg/dL Final   • Creatinine 11/25/2020 0.98  0.76 - 1.27 mg/dL Final   • eGFR Non  Am 11/25/2020 79  >60 mL/min/1.73 Final   • eGFR African Am 11/25/2020 96  >60 mL/min/1.73 Final   • BUN/Creatinine Ratio 11/25/2020 12.2  7.0 - 25.0 Final   • Sodium 11/25/2020 141  136 - 145 mmol/L Final   • Potassium 11/25/2020 4.6  3.5 - 5.2 mmol/L Final   • Chloride 11/25/2020 103  98 - 107 mmol/L Final   • Total CO2 11/25/2020 30.9* 22.0 - 29.0 mmol/L Final   • Calcium 11/25/2020 9.4  8.6 - 10.5 mg/dL Final   • Total Protein 11/25/2020 6.3  6.0 - 8.5 g/dL Final   • Albumin 11/25/2020 4.60  3.50 - 5.20 g/dL Final   • Globulin 11/25/2020 1.7  gm/dL Final   • A/G Ratio 11/25/2020 2.7  g/dL Final   • Total Bilirubin 11/25/2020 1.1  0.0 - 1.2 mg/dL Final   • Alkaline Phosphatase 11/25/2020 56  39 - 117 U/L Final   • AST (SGOT) 11/25/2020 11  1 - 40 U/L Final   • ALT (SGPT) 11/25/2020 19  1 - 41 U/L Final   • WBC 11/25/2020 6.01  3.40 - 10.80 10*3/mm3 Final   • RBC 11/25/2020 5.36  4.14 - 5.80 10*6/mm3 Final   • Hemoglobin 11/25/2020 15.5  13.0 - 17.7 g/dL Final   • Hematocrit 11/25/2020 46.1  37.5 - 51.0 % Final   • MCV 11/25/2020 86.0  79.0 - 97.0 fL Final   • MCH 11/25/2020 28.9  26.6 - 33.0 pg Final   • MCHC 11/25/2020 33.6  31.5 - 35.7 g/dL Final   •  RDW 11/25/2020 12.9  12.3 - 15.4 % Final   • Platelets 11/25/2020 269  140 - 450 10*3/mm3 Final   • Neutrophil Rel % 11/25/2020 69.1  42.7 - 76.0 % Final   • Lymphocyte Rel % 11/25/2020 20.1  19.6 - 45.3 % Final   • Monocyte Rel % 11/25/2020 8.0  5.0 - 12.0 % Final   • Eosinophil Rel % 11/25/2020 1.7  0.3 - 6.2 % Final   • Basophil Rel % 11/25/2020 0.8  0.0 - 1.5 % Final   • Neutrophils Absolute 11/25/2020 4.15  1.70 - 7.00 10*3/mm3 Final   • Lymphocytes Absolute 11/25/2020 1.21  0.70 - 3.10 10*3/mm3 Final   • Monocytes Absolute 11/25/2020 0.48  0.10 - 0.90 10*3/mm3 Final   • Eosinophils Absolute 11/25/2020 0.10  0.00 - 0.40 10*3/mm3 Final   • Basophils Absolute 11/25/2020 0.05  0.00 - 0.20 10*3/mm3 Final   • Immature Granulocyte Rel % 11/25/2020 0.3  0.0 - 0.5 % Final   • Immature Grans Absolute 11/25/2020 0.02  0.00 - 0.05 10*3/mm3 Final   • nRBC 11/25/2020 0.0  0.0 - 0.2 /100 WBC Final   • Total Cholesterol 11/25/2020 219* 0 - 200 mg/dL Final   • Triglycerides 11/25/2020 124  0 - 150 mg/dL Final   • HDL Cholesterol 11/25/2020 47  40 - 60 mg/dL Final   • VLDL Cholesterol Juan Francisco 11/25/2020 22  5 - 40 mg/dL Final   • LDL Chol Calc ([facility]) 11/25/2020 150* 0 - 100 mg/dL Final   • Chol/HDL Ratio 11/25/2020 4.66   Final

## 2020-12-30 ENCOUNTER — TELEMEDICINE (OUTPATIENT)
Dept: FAMILY MEDICINE CLINIC | Facility: CLINIC | Age: 55
End: 2020-12-30

## 2020-12-30 ENCOUNTER — OFFICE VISIT (OUTPATIENT)
Dept: FAMILY MEDICINE CLINIC | Facility: CLINIC | Age: 55
End: 2020-12-30

## 2020-12-30 DIAGNOSIS — F41.1 GAD (GENERALIZED ANXIETY DISORDER): Primary | ICD-10-CM

## 2020-12-30 PROCEDURE — 99421 OL DIG E/M SVC 5-10 MIN: CPT | Performed by: PHYSICIAN ASSISTANT

## 2020-12-30 RX ORDER — FLUOXETINE HYDROCHLORIDE 20 MG/1
20 CAPSULE ORAL DAILY
Qty: 30 CAPSULE | Refills: 1
Start: 2020-12-30 | End: 2021-01-27

## 2020-12-30 RX ORDER — FLUOXETINE HYDROCHLORIDE 20 MG/1
20 CAPSULE ORAL DAILY
Qty: 30 CAPSULE | Refills: 1 | Status: SHIPPED | OUTPATIENT
Start: 2020-12-30 | End: 2020-12-30 | Stop reason: SDUPTHER

## 2020-12-30 NOTE — PROGRESS NOTES
Subjective   Pj Steele is a 55 y.o. male presents for   Chief Complaint   Patient presents with   • Anxiety     Management     .Unable to complete visit using a video connection to the patient. A phone visit was used to complete this visits. Total time of discussion was 10 minutes.  History of Present Illness     Pj is a 55-year-old male who presents using [employer] video encounter for generalized anxiety management.  He has been taking the fluoxetine 10 mg daily.  States he has seen a slight improvement with his anxiety issues but thinks it needs to be increased.  He is still slightly anxious at times.  Denied any suicidal or homicidal ideation.  Has good support with wife at home.  Appetite and sleep have been normal for him.  Has no complaints with the medication.    The following portions of the patient's history were reviewed and updated as appropriate: allergies, current medications, past family history, past medical history, past social history, past surgical history and problem list.    Review of Systems   Constitutional: Negative.    HENT: Negative.    Eyes: Negative.    Respiratory: Negative.    Cardiovascular: Negative.    Gastrointestinal: Negative.    Endocrine: Negative.    Genitourinary: Negative.    Musculoskeletal: Negative.    Skin: Negative.    Allergic/Immunologic: Negative.    Neurological: Negative.    Hematological: Negative.    Psychiatric/Behavioral: Negative for sleep disturbance and suicidal ideas. The patient is nervous/anxious.      No vital signs obtained due to telephone encounter.    There were no vitals filed for this visit.  Wt Readings from Last 3 Encounters:   12/02/20 92.5 kg (204 lb)   06/22/20 91.6 kg (202 lb)   05/07/20 93.9 kg (207 lb)     BP Readings from Last 3 Encounters:   12/02/20 138/82   06/22/20 130/78   05/07/20 140/82     Social History     Socioeconomic History   • Marital status:      Spouse name: Not on file   • Number of children: Not on file   •  Years of education: Not on file   • Highest education level: Not on file   Tobacco Use   • Smoking status: Never Smoker   • Smokeless tobacco: Never Used   Substance and Sexual Activity   • Alcohol use: Yes   • Drug use: Defer   • Sexual activity: Defer     Partners: Female       Allergies   Allergen Reactions   • Statins Myalgia   • Amoxicillin Rash       There is no height or weight on file to calculate BMI.    Objective   Physical Exam  Pulmonary:      Breath sounds: Normal breath sounds.      Comments: Breathing sounds are normal without wheezing or coughing during the telephone encounter.  Neurological:      Mental Status: He is alert and oriented to person, place, and time.   Psychiatric:         Attention and Perception: Attention and perception normal.         Mood and Affect: Affect normal. Mood is anxious.         Speech: Speech normal.         Behavior: Behavior is cooperative.         Thought Content: Thought content normal.         Cognition and Memory: Cognition and memory normal.         Judgment: Judgment normal.       Physical exam was limited due to telephone encounter.  Assessment/Plan   Diagnoses and all orders for this visit:    1. ANYI (generalized anxiety disorder) (Primary)  -     FLUoxetine (PROzac) 20 MG capsule; Take 1 capsule by mouth Daily.  Dispense: 30 capsule; Refill: 1      Mr. Pj Steele was seen using telephone encounter due to technical difficulty connecting through the [email or web address] video/Zoom.  I will increase his fluoxetine to 20 mg daily.  New prescription was sent to his pharmacy.  Will come to office in 3-4 weeks for reevaluation of his generalized anxiety.    I spent approximately 10 minutes via telephone discussing his generalized anxiety, medication usage and treatment options with him.    [facility]  [address] [place] [address]  Dept: [phone]  Dept Fax: [phone]  Loc:  [phone]  Loc Fax: [phone]

## 2020-12-30 NOTE — PROGRESS NOTES
Subjective   Pj Steele is a 55 y.o. male presents for   Chief Complaint   Patient presents with   • Anxiety     Management     Unable to complete visit using a video connection to the patient. A phone visit was used to complete this visits. Total time of discussion was 10 minutes.    History of Present Illness     Pj  is a 55-year-old male who presents using telephone encounter due to technical difficulty connecting with [employer] video today.  States he has seen a slight improvement with the fluoxetine 10 mg medication daily.  States he is still having some anxiety issues though.  Still have some short tempered episodes but overall has improved slightly.  Thinks the medication needs to be increased.  Denied any suicidal or homicidal ideation.  Has good support with family members.  Appetite and sleep have been normal for him.  Has no complaints with the medication.    The following portions of the patient's history were reviewed and updated as appropriate: allergies, current medications, past family history, past medical history, past social history, past surgical history and problem list.    Review of Systems   Constitutional: Negative.    HENT: Negative.    Eyes: Negative.    Respiratory: Negative.    Cardiovascular: Negative.    Gastrointestinal: Negative.    Endocrine: Negative.    Genitourinary: Negative.    Musculoskeletal: Negative.    Skin: Negative.    Allergic/Immunologic: Negative.    Neurological: Negative.    Hematological: Negative.    Psychiatric/Behavioral: Negative for sleep disturbance and suicidal ideas. The patient is nervous/anxious.      No vital signs obtained due to telephone encounter.    There were no vitals filed for this visit.  Wt Readings from Last 3 Encounters:   12/02/20 92.5 kg (204 lb)   06/22/20 91.6 kg (202 lb)   05/07/20 93.9 kg (207 lb)     BP Readings from Last 3 Encounters:   12/02/20 138/82   06/22/20 130/78   05/07/20 140/82     Social History     Socioeconomic  History   • Marital status:      Spouse name: Not on file   • Number of children: Not on file   • Years of education: Not on file   • Highest education level: Not on file   Tobacco Use   • Smoking status: Never Smoker   • Smokeless tobacco: Never Used   Substance and Sexual Activity   • Alcohol use: Yes   • Drug use: Defer   • Sexual activity: Defer     Partners: Female       Allergies   Allergen Reactions   • Statins Myalgia   • Amoxicillin Rash       There is no height or weight on file to calculate BMI.    Objective   Physical Exam  HENT:      Head: Normocephalic and atraumatic.   Pulmonary:      Breath sounds: Normal breath sounds.      Comments: Breathing sounds normal without wheezing or coughing during the telephone encounter.  Neurological:      Mental Status: He is alert and oriented to person, place, and time.   Psychiatric:         Attention and Perception: Attention and perception normal.         Mood and Affect: Affect normal. Mood is anxious.         Speech: Speech normal.         Behavior: Behavior normal. Behavior is cooperative.         Thought Content: Thought content normal.         Cognition and Memory: Cognition and memory normal.         Judgment: Judgment normal.     Physical exam was limited due to telephone encounter.    Assessment/Plan   Diagnoses and all orders for this visit:    1. ANYI (generalized anxiety disorder) (Primary)  -     FLUoxetine (PROzac) 20 MG capsule; Take 1 capsule by mouth Daily.  Dispense: 30 capsule; Refill: 1      Mr. Pj Steele was seen using telephone encounter.  The encounter was switched to telephone due to technical difficulty with the [employer] video visit.  We will increase his fluoxetine to 20 mg daily.  New prescription was sent to pharmacy.  Will schedule follow-up appointment in office in the next 3-4 weeks.  He voiced understanding.    I spent approximately 10 minutes via telephone discussing his signs, symptoms, medication and medication treatment  with him.      [facility]  [address] [address]  Dept: [phone]  Dept Fax: [phone]  Loc: [phone]  Loc Fax: [phone]

## 2021-01-27 ENCOUNTER — OFFICE VISIT (OUTPATIENT)
Dept: FAMILY MEDICINE CLINIC | Facility: CLINIC | Age: 56
End: 2021-01-27

## 2021-01-27 VITALS
OXYGEN SATURATION: 100 % | WEIGHT: 205 LBS | SYSTOLIC BLOOD PRESSURE: 138 MMHG | BODY MASS INDEX: 26.31 KG/M2 | DIASTOLIC BLOOD PRESSURE: 86 MMHG | HEIGHT: 74 IN | HEART RATE: 72 BPM

## 2021-01-27 DIAGNOSIS — F41.1 GAD (GENERALIZED ANXIETY DISORDER): ICD-10-CM

## 2021-01-27 PROCEDURE — 99213 OFFICE O/P EST LOW 20 MIN: CPT | Performed by: PHYSICIAN ASSISTANT

## 2021-01-27 RX ORDER — FLUOXETINE HYDROCHLORIDE 20 MG/1
20 CAPSULE ORAL DAILY
Qty: 30 CAPSULE | Refills: 3 | Status: SHIPPED | OUTPATIENT
Start: 2021-01-27 | End: 2021-02-26

## 2021-01-27 NOTE — PROGRESS NOTES
"Chief Complaint  Anxiety    Subjective          Pj Steele presents to [facility] for   History of Present Illness  jP is a 55-year-old male who presents for general anxiety management.  Has seen an improvement with the Prozac 20 mg daily.  States medication has helped with his anxiety.  Denied any suicidal or homicidal ideation.  Appetite and sleep have been normal.  Has no complaints today.  Objective   Vital Signs:   /86 (BP Location: Left arm, Patient Position: Sitting, Cuff Size: Adult)   Pulse 72   Ht 188 cm (74\")   Wt 93 kg (205 lb)   SpO2 100%   BMI 26.32 kg/m²     Physical Exam  Vitals signs and nursing note reviewed.   Constitutional:       Appearance: Normal appearance. He is well-developed, well-groomed and overweight.      Interventions: Face mask in place.   HENT:      Head: Normocephalic and atraumatic.   Neck:      Musculoskeletal: Neck supple.      Vascular: No carotid bruit.      Trachea: Trachea and phonation normal.   Cardiovascular:      Rate and Rhythm: Normal rate and regular rhythm.      Heart sounds: Normal heart sounds, S1 normal and S2 normal. No murmur.   Pulmonary:      Effort: Pulmonary effort is normal.      Breath sounds: Normal breath sounds and air entry.   Abdominal:      General: Bowel sounds are normal.      Palpations: Abdomen is soft.      Tenderness: There is no abdominal tenderness.   Musculoskeletal:      Right lower leg: No edema.      Left lower leg: No edema.   Skin:     General: Skin is warm and dry.      Capillary Refill: Capillary refill takes less than 2 seconds.   Neurological:      Mental Status: He is alert and oriented to person, place, and time.   Psychiatric:         Attention and Perception: Attention normal.         Mood and Affect: Mood and affect normal.         Speech: Speech normal.         Behavior: Behavior normal. Behavior is cooperative.         Thought Content: Thought content normal.         " Cognition and Memory: Cognition and memory normal.     I was wearing a surgical mask and face shield during the entire office visit/encounter.     Result Review :                 Assessment and Plan    Problem List Items Addressed This Visit     None      Visit Diagnoses     ANYI (generalized anxiety disorder)        Relevant Medications    FLUoxetine (PROzac) 20 MG capsule      Mr. Pj Steele was seen in office today for chronic and stable generalized anxiety disorder.  Doing well with his Prozac 20 mg daily.  I have sent a refill to pharmacy.  Will return to office in June 2021 for reevaluation.      Follow Up   Return in about 5 months (around 6/27/2021), or ANYI.  Patient was given instructions and counseling regarding his condition or for health maintenance advice. Please see specific information pulled into the AVS if appropriate.     [facility]ode[facility]  [address] [place] [address]  Dept: [phone]  Dept Fax: [phone]  Loc: [phone]  Loc Fax: [phone]

## 2021-02-17 ENCOUNTER — PREP FOR SURGERY (OUTPATIENT)
Dept: OTHER | Facility: HOSPITAL | Age: 56
End: 2021-02-17

## 2021-02-17 DIAGNOSIS — Z86.010 HISTORY OF COLONIC POLYPS: Primary | ICD-10-CM

## 2021-02-26 DIAGNOSIS — F41.1 GAD (GENERALIZED ANXIETY DISORDER): ICD-10-CM

## 2021-02-26 RX ORDER — FLUOXETINE HYDROCHLORIDE 20 MG/1
CAPSULE ORAL
Qty: 30 CAPSULE | Refills: 6 | Status: SHIPPED | OUTPATIENT
Start: 2021-02-26 | End: 2022-01-10 | Stop reason: DRUGHIGH

## 2021-03-03 PROBLEM — Z86.010 HISTORY OF COLONIC POLYPS: Status: ACTIVE | Noted: 2021-03-03

## 2021-03-03 PROBLEM — Z86.0100 HISTORY OF COLONIC POLYPS: Status: ACTIVE | Noted: 2021-03-03

## 2021-03-09 ENCOUNTER — TRANSCRIBE ORDERS (OUTPATIENT)
Dept: ADMINISTRATIVE | Facility: HOSPITAL | Age: 56
End: 2021-03-09

## 2021-03-09 DIAGNOSIS — Z01.818 PREOP EXAMINATION: Primary | ICD-10-CM

## 2021-03-25 ENCOUNTER — IMMUNIZATION (OUTPATIENT)
Dept: VACCINE CLINIC | Facility: HOSPITAL | Age: 56
End: 2021-03-25

## 2021-03-25 PROCEDURE — 0001A: CPT | Performed by: OBSTETRICS & GYNECOLOGY

## 2021-03-25 PROCEDURE — 91300 HC SARSCOV02 VAC 30MCG/0.3ML IM: CPT | Performed by: OBSTETRICS & GYNECOLOGY

## 2021-04-15 ENCOUNTER — IMMUNIZATION (OUTPATIENT)
Dept: VACCINE CLINIC | Facility: HOSPITAL | Age: 56
End: 2021-04-15

## 2021-04-15 PROCEDURE — 0002A: CPT | Performed by: OBSTETRICS & GYNECOLOGY

## 2021-04-15 PROCEDURE — 91300 HC SARSCOV02 VAC 30MCG/0.3ML IM: CPT | Performed by: OBSTETRICS & GYNECOLOGY

## 2021-05-04 ENCOUNTER — LAB (OUTPATIENT)
Dept: LAB | Facility: HOSPITAL | Age: 56
End: 2021-05-04

## 2021-05-04 ENCOUNTER — APPOINTMENT (OUTPATIENT)
Dept: LAB | Facility: HOSPITAL | Age: 56
End: 2021-05-04

## 2021-05-04 DIAGNOSIS — Z01.818 PREOP EXAMINATION: ICD-10-CM

## 2021-05-04 LAB — SARS-COV-2 RNA PNL SPEC NAA+PROBE: NOT DETECTED

## 2021-05-04 PROCEDURE — C9803 HOPD COVID-19 SPEC COLLECT: HCPCS

## 2021-05-04 PROCEDURE — 87635 SARS-COV-2 COVID-19 AMP PRB: CPT

## 2021-05-05 ENCOUNTER — ANESTHESIA EVENT (OUTPATIENT)
Dept: PERIOP | Facility: HOSPITAL | Age: 56
End: 2021-05-05

## 2021-05-06 ENCOUNTER — HOSPITAL ENCOUNTER (OUTPATIENT)
Facility: HOSPITAL | Age: 56
Setting detail: HOSPITAL OUTPATIENT SURGERY
Discharge: HOME | End: 2021-05-06
Attending: SURGERY | Admitting: ANESTHESIOLOGY

## 2021-05-06 ENCOUNTER — ANESTHESIA (OUTPATIENT)
Dept: PERIOP | Facility: HOSPITAL | Age: 56
End: 2021-05-06

## 2021-05-06 VITALS
TEMPERATURE: 98.4 F | SYSTOLIC BLOOD PRESSURE: 132 MMHG | RESPIRATION RATE: 16 BRPM | BODY MASS INDEX: 25.65 KG/M2 | HEART RATE: 68 BPM | WEIGHT: 199.8 LBS | DIASTOLIC BLOOD PRESSURE: 85 MMHG | OXYGEN SATURATION: 97 %

## 2021-05-06 PROCEDURE — 25010000002 PROPOFOL 10 MG/ML EMULSION: Performed by: NURSE ANESTHETIST, CERTIFIED REGISTERED

## 2021-05-06 PROCEDURE — S0260 H&P FOR SURGERY: HCPCS | Performed by: SURGERY

## 2021-05-06 PROCEDURE — 45378 DIAGNOSTIC COLONOSCOPY: CPT | Performed by: SURGERY

## 2021-05-06 RX ORDER — LIDOCAINE HYDROCHLORIDE 20 MG/ML
INJECTION, SOLUTION INFILTRATION; PERINEURAL AS NEEDED
Status: DISCONTINUED | OUTPATIENT
Start: 2021-05-06 | End: 2021-05-06 | Stop reason: SURG

## 2021-05-06 RX ORDER — SODIUM CHLORIDE 0.9 % (FLUSH) 0.9 %
10 SYRINGE (ML) INJECTION AS NEEDED
Status: DISCONTINUED | OUTPATIENT
Start: 2021-05-06 | End: 2021-05-06 | Stop reason: HOSPADM

## 2021-05-06 RX ORDER — MAGNESIUM HYDROXIDE 1200 MG/15ML
LIQUID ORAL AS NEEDED
Status: DISCONTINUED | OUTPATIENT
Start: 2021-05-06 | End: 2021-05-06 | Stop reason: HOSPADM

## 2021-05-06 RX ORDER — SODIUM CHLORIDE, SODIUM LACTATE, POTASSIUM CHLORIDE, CALCIUM CHLORIDE 600; 310; 30; 20 MG/100ML; MG/100ML; MG/100ML; MG/100ML
9 INJECTION, SOLUTION INTRAVENOUS CONTINUOUS
Status: DISCONTINUED | OUTPATIENT
Start: 2021-05-06 | End: 2021-05-06 | Stop reason: HOSPADM

## 2021-05-06 RX ORDER — PROPOFOL 10 MG/ML
VIAL (ML) INTRAVENOUS AS NEEDED
Status: DISCONTINUED | OUTPATIENT
Start: 2021-05-06 | End: 2021-05-06 | Stop reason: SURG

## 2021-05-06 RX ORDER — SODIUM CHLORIDE 0.9 % (FLUSH) 0.9 %
10 SYRINGE (ML) INJECTION EVERY 12 HOURS SCHEDULED
Status: DISCONTINUED | OUTPATIENT
Start: 2021-05-06 | End: 2021-05-06 | Stop reason: HOSPADM

## 2021-05-06 RX ORDER — LIDOCAINE HYDROCHLORIDE 10 MG/ML
0.5 INJECTION, SOLUTION EPIDURAL; INFILTRATION; INTRACAUDAL; PERINEURAL ONCE AS NEEDED
Status: DISCONTINUED | OUTPATIENT
Start: 2021-05-06 | End: 2021-05-06 | Stop reason: HOSPADM

## 2021-05-06 RX ORDER — SODIUM CHLORIDE 9 MG/ML
40 INJECTION, SOLUTION INTRAVENOUS AS NEEDED
Status: DISCONTINUED | OUTPATIENT
Start: 2021-05-06 | End: 2021-05-06 | Stop reason: HOSPADM

## 2021-05-06 RX ADMIN — SODIUM CHLORIDE, POTASSIUM CHLORIDE, SODIUM LACTATE AND CALCIUM CHLORIDE 9 ML/HR: 600; 310; 30; 20 INJECTION, SOLUTION INTRAVENOUS at 07:05

## 2021-05-06 RX ADMIN — PROPOFOL 20 MG: 10 INJECTION, EMULSION INTRAVENOUS at 07:41

## 2021-05-06 RX ADMIN — PROPOFOL 50 MG: 10 INJECTION, EMULSION INTRAVENOUS at 07:38

## 2021-05-06 RX ADMIN — PROPOFOL 50 MG: 10 INJECTION, EMULSION INTRAVENOUS at 07:32

## 2021-05-06 RX ADMIN — PROPOFOL 50 MG: 10 INJECTION, EMULSION INTRAVENOUS at 07:33

## 2021-05-06 RX ADMIN — LIDOCAINE HYDROCHLORIDE 50 MG: 20 INJECTION, SOLUTION INFILTRATION; PERINEURAL at 07:31

## 2021-05-06 RX ADMIN — SODIUM CHLORIDE, POTASSIUM CHLORIDE, SODIUM LACTATE AND CALCIUM CHLORIDE: 600; 310; 30; 20 INJECTION, SOLUTION INTRAVENOUS at 07:32

## 2021-05-06 RX ADMIN — PROPOFOL 30 MG: 10 INJECTION, EMULSION INTRAVENOUS at 07:45

## 2021-05-06 NOTE — ANESTHESIA PREPROCEDURE EVALUATION
Anesthesia Evaluation     Patient summary reviewed and Nursing notes reviewed                Airway   Mallampati: II  TM distance: >3 FB  Neck ROM: full  No difficulty expected  Dental - normal exam     Pulmonary     breath sounds clear to auscultation  Sleep apnea: snoring.  Cardiovascular - normal exam    ECG reviewed  Rhythm: regular  Rate: normal    (+) hypertension well controlled less than 2 medications,       Neuro/Psych  (+) psychiatric history Depression,     GI/Hepatic/Renal/Endo - negative ROS     Musculoskeletal     (+) back pain,   Abdominal  - normal exam   Substance History   (+) alcohol use (occ\),      OB/GYN          Other   arthritis,      Blood dyscrasia: off ASA since Monday 5/3. History of cancer: skin.                  Anesthesia Plan    ASA 2     intravenous induction     Anesthetic plan, all risks, benefits, and alternatives have been provided, discussed and informed consent has been obtained with: patient.  Use of blood products discussed with patient  Consented to blood products.

## 2021-05-06 NOTE — ANESTHESIA POSTPROCEDURE EVALUATION
Patient: Pj Steele    Procedure Summary     Date: 05/06/21 Room / Location: [facility] ENDOSCOPY 2 /  [facility] OR    Anesthesia Start: 0730 Anesthesia Stop: 0758    Procedure: COLONOSCOPY (N/A ) Diagnosis:       History of colonic polyps      (History of colonic polyps [Z86.010])    Surgeons: Anais Beltre MD Provider: Victor Manuel Spear CRNA    Anesthesia Type: MAC ASA Status: 2          Anesthesia Type: MAC    Vitals  Vitals Value Taken Time   /85 05/06/21 0820   Temp 98.4 °F (36.9 °C) 05/06/21 0801   Pulse 68 05/06/21 0820   Resp 16 05/06/21 0820   SpO2 97 % 05/06/21 0820           Post Anesthesia Care and Evaluation    Patient location during evaluation: PHASE II  Patient participation: complete - patient participated  Level of consciousness: awake  Pain score: 0  Pain management: adequate  Airway patency: patent  Anesthetic complications: No anesthetic complications  PONV Status: none  Cardiovascular status: acceptable  Respiratory status: acceptable  Hydration status: acceptable

## 2021-06-02 ENCOUNTER — OFFICE VISIT (OUTPATIENT)
Dept: FAMILY MEDICINE CLINIC | Facility: CLINIC | Age: 56
End: 2021-06-02

## 2021-06-02 VITALS
WEIGHT: 205 LBS | BODY MASS INDEX: 26.31 KG/M2 | HEART RATE: 83 BPM | HEIGHT: 74 IN | OXYGEN SATURATION: 98 % | DIASTOLIC BLOOD PRESSURE: 82 MMHG | SYSTOLIC BLOOD PRESSURE: 136 MMHG | TEMPERATURE: 98.4 F

## 2021-06-02 DIAGNOSIS — J06.9 ACUTE URI: Primary | ICD-10-CM

## 2021-06-02 DIAGNOSIS — J01.00 ACUTE MAXILLARY SINUSITIS, RECURRENCE NOT SPECIFIED: ICD-10-CM

## 2021-06-02 PROCEDURE — 99213 OFFICE O/P EST LOW 20 MIN: CPT | Performed by: PHYSICIAN ASSISTANT

## 2021-06-02 RX ORDER — METHYLPREDNISOLONE 4 MG/1
TABLET ORAL
Qty: 21 TABLET | Refills: 0 | Status: SHIPPED | OUTPATIENT
Start: 2021-06-02 | End: 2022-01-10

## 2021-06-02 RX ORDER — AZITHROMYCIN 250 MG/1
TABLET, FILM COATED ORAL
Qty: 6 TABLET | Refills: 0 | Status: SHIPPED | OUTPATIENT
Start: 2021-06-02 | End: 2022-01-10

## 2021-06-02 NOTE — PROGRESS NOTES
"Chief Complaint  nasal drainage, Sore Throat, Cough, and Nasal Congestion    Subjective          Pj Steele presents to [facility]  History of Present Illness  Pj is a 56-year-old male who presents with wife at office visit today.  He presents with nasal congestion, stuffy nose, sore throat, dry cough, ear pressure, headache, sinus pressure, and postnasal drip for the past several days.  Getting ready to go on vacation.  Wife had similar symptoms a few weeks ago but she is improving.  Denied any fevers, chills, shortness of air, wheezing, nausea, vomiting, diarrhea, or loss of taste or smell.  Has had both common immunizations.  Sleep has been restless due to symptoms.  Appetite has been slightly decreased.     Objective   Vital Signs:   /82   Pulse 83   Temp 98.4 °F (36.9 °C)   Ht 188 cm (74\")   Wt 93 kg (205 lb)   SpO2 98%   BMI 26.32 kg/m²     Physical Exam  Vitals and nursing note reviewed.   Constitutional:       Appearance: Normal appearance. He is well-developed, well-groomed and overweight.      Interventions: Face mask in place.      Comments: Wife in exam room wearing facial mask.  Pj sounds congested   HENT:      Head: Normocephalic and atraumatic.      Jaw: There is normal jaw occlusion.      Right Ear: Hearing, ear canal and external ear normal. Tympanic membrane is bulging.      Left Ear: Hearing, ear canal and external ear normal. Tympanic membrane is bulging.      Nose:      Right Sinus: Maxillary sinus tenderness present. No frontal sinus tenderness.      Left Sinus: Maxillary sinus tenderness present. No frontal sinus tenderness.      Mouth/Throat:      Lips: Pink.      Mouth: Mucous membranes are moist.      Dentition: Normal dentition.      Tongue: No lesions.      Pharynx: Oropharynx is clear. Uvula midline. No pharyngeal swelling, oropharyngeal exudate, posterior oropharyngeal erythema or uvula swelling.      Tonsils: No tonsillar exudate. "   Eyes:      General: Lids are normal.      Conjunctiva/sclera: Conjunctivae normal.      Pupils: Pupils are equal, round, and reactive to light.   Neck:      Trachea: Trachea and phonation normal. No tracheal tenderness.   Cardiovascular:      Rate and Rhythm: Normal rate and regular rhythm.      Pulses: Normal pulses.      Heart sounds: Normal heart sounds, S1 normal and S2 normal. No murmur heard.     Pulmonary:      Effort: Pulmonary effort is normal.      Breath sounds: Normal breath sounds and air entry.   Abdominal:      General: Bowel sounds are normal.      Palpations: Abdomen is soft.      Tenderness: There is no abdominal tenderness.   Musculoskeletal:      Cervical back: Neck supple.      Right lower leg: No edema.      Left lower leg: No edema.   Lymphadenopathy:      Cervical: No cervical adenopathy.      Right cervical: No superficial, deep or posterior cervical adenopathy.     Left cervical: No superficial, deep or posterior cervical adenopathy.   Skin:     General: Skin is warm and dry.      Capillary Refill: Capillary refill takes less than 2 seconds.   Neurological:      Mental Status: He is alert and oriented to person, place, and time.      Deep Tendon Reflexes:      Reflex Scores:       Patellar reflexes are 2+ on the right side and 2+ on the left side.  Psychiatric:         Attention and Perception: Attention and perception normal.         Mood and Affect: Mood and affect normal.         Speech: Speech normal.         Behavior: Behavior is cooperative.         Thought Content: Thought content normal.         Cognition and Memory: Cognition and memory normal.         Judgment: Judgment normal.     I was wearing a surgical mask and face shield during the entire office visit/encounter.     Result Review :                 Assessment and Plan    Diagnoses and all orders for this visit:    1. Acute URI (Primary)  -     azithromycin (Zithromax Z-Froylan) 250 MG tablet; Take 2 tablets by mouth on day 1,  then 1 tablet daily on days 2-5  Dispense: 6 tablet; Refill: 0  -     methylPREDNISolone (MEDROL) 4 MG dose pack; Take as directed on package instructions.  Dispense: 21 tablet; Refill: 0    2. Acute maxillary sinusitis, recurrence not specified  -     azithromycin (Zithromax Z-Froylan) 250 MG tablet; Take 2 tablets by mouth on day 1, then 1 tablet daily on days 2-5  Dispense: 6 tablet; Refill: 0  -     methylPREDNISolone (MEDROL) 4 MG dose pack; Take as directed on package instructions.  Dispense: 21 tablet; Refill: 0    Mr. Pj Steele was seen in office today with wife.  He was diagnosed with new onset acute upper respiratory infection with acute maxillary sinusitis.  Have sent a Z-Froylan and a Medrol Dosepak to pharmacy.  Instructed to change his toothpaste after being on the antibiotic 24 hours.  May use over-the-counter Coricidin as needed.  Will return to office if symptoms do not improve.  Stressed the importance of staying hydrated.    Follow Up   No follow-ups on file.  Patient was given instructions and counseling regarding his condition or for health maintenance advice. Please see specific information pulled into the AVS if appropriate.     [facility]  [address] [address]  Dept: [phone]  Dept Fax: [phone]  Loc: [phone]  Loc Fax: [phone]

## 2021-10-20 DIAGNOSIS — I10 ESSENTIAL HYPERTENSION: ICD-10-CM

## 2021-10-20 RX ORDER — LOSARTAN POTASSIUM 100 MG/1
TABLET ORAL
Qty: 30 TABLET | Refills: 0 | Status: SHIPPED | OUTPATIENT
Start: 2021-10-20 | End: 2021-12-26

## 2021-11-20 ENCOUNTER — IMMUNIZATION (OUTPATIENT)
Dept: VACCINE CLINIC | Facility: HOSPITAL | Age: 56
End: 2021-11-20

## 2021-11-20 PROCEDURE — 0004A ADM SARSCOV2 30MCG/0.3ML BOOSTER: CPT | Performed by: INTERNAL MEDICINE

## 2021-11-20 PROCEDURE — 91300 HC SARSCOV02 VAC 30MCG/0.3ML IM: CPT | Performed by: INTERNAL MEDICINE

## 2021-12-26 DIAGNOSIS — I10 ESSENTIAL HYPERTENSION: ICD-10-CM

## 2021-12-26 RX ORDER — LOSARTAN POTASSIUM 100 MG/1
100 TABLET ORAL DAILY
Qty: 30 TABLET | Refills: 0 | Status: SHIPPED | OUTPATIENT
Start: 2021-12-26 | End: 2022-01-24

## 2022-01-10 ENCOUNTER — OFFICE VISIT (OUTPATIENT)
Dept: FAMILY MEDICINE CLINIC | Facility: CLINIC | Age: 57
End: 2022-01-10

## 2022-01-10 ENCOUNTER — HOSPITAL ENCOUNTER (OUTPATIENT)
Dept: GENERAL RADIOLOGY | Facility: HOSPITAL | Age: 57
Discharge: HOME | End: 2022-01-10
Admitting: PHYSICIAN ASSISTANT

## 2022-01-10 VITALS
OXYGEN SATURATION: 99 % | HEART RATE: 81 BPM | SYSTOLIC BLOOD PRESSURE: 138 MMHG | WEIGHT: 211 LBS | DIASTOLIC BLOOD PRESSURE: 82 MMHG | TEMPERATURE: 98.2 F | BODY MASS INDEX: 27.08 KG/M2 | RESPIRATION RATE: 15 BRPM | HEIGHT: 74 IN

## 2022-01-10 DIAGNOSIS — M25.512 CHRONIC PAIN OF BOTH SHOULDERS: ICD-10-CM

## 2022-01-10 DIAGNOSIS — M25.511 CHRONIC PAIN OF BOTH SHOULDERS: ICD-10-CM

## 2022-01-10 DIAGNOSIS — M25.512 CHRONIC PAIN OF BOTH SHOULDERS: Primary | ICD-10-CM

## 2022-01-10 DIAGNOSIS — G89.29 CHRONIC PAIN OF BOTH SHOULDERS: Primary | ICD-10-CM

## 2022-01-10 DIAGNOSIS — M25.511 CHRONIC PAIN OF BOTH SHOULDERS: Primary | ICD-10-CM

## 2022-01-10 DIAGNOSIS — G89.29 CHRONIC PAIN OF BOTH SHOULDERS: ICD-10-CM

## 2022-01-10 DIAGNOSIS — F41.1 GAD (GENERALIZED ANXIETY DISORDER): ICD-10-CM

## 2022-01-10 PROCEDURE — 73030 X-RAY EXAM OF SHOULDER: CPT

## 2022-01-10 PROCEDURE — 99214 OFFICE O/P EST MOD 30 MIN: CPT | Performed by: PHYSICIAN ASSISTANT

## 2022-01-10 RX ORDER — FLUOXETINE HYDROCHLORIDE 40 MG/1
40 CAPSULE ORAL DAILY
Qty: 30 CAPSULE | Refills: 0 | Status: SHIPPED | OUTPATIENT
Start: 2022-01-10 | End: 2022-02-07

## 2022-01-10 NOTE — PROGRESS NOTES
"Chief Complaint  Shoulder Pain (bilateral) and Anxiety (Management)    Subjective          Pj Steele presents to [facility]  History of Present Illness  Pj is 56 year old male who presents bilateral shoulder pain with anxiety management.  States he has been having bilateral shoulder pain for years.  States it has gotten worse for the past year.  Right shoulder is worse than left shoulder.  Has had increased pain when lying on right or left side.  Describes the pain as a \"bone on bone\" pain.  The pain will be a dull ache to a sharp pain.  Has been using over-the-counter Tylenol with slight relief.  Denied any recent injury or trauma to the shoulders.    Pj has been taking Prozac 20 mg daily.  States his wife's leukemia has worsened and she is no longer in remission.  Recently lost a neighbor to \[facility]\"".  He has been struggling with his anxiety issues.  Has been compliant with medication though.  Denied any suicidal or homicidal ideation.  Appetite has been normal.  Sleep has been restless due to shoulder pain.  Review of Systems   Musculoskeletal: Positive for arthralgias.   Psychiatric/Behavioral: Positive for sleep disturbance. The patient is nervous/anxious.    All other systems reviewed and are negative.    Objective   Vital Signs:   /82 (BP Location: Left arm, Patient Position: Sitting, Cuff Size: Adult)   Pulse 81   Temp 98.2 °F (36.8 °C)   Resp 15   Ht 188 cm (74\")   Wt 95.7 kg (211 lb)   SpO2 99%   BMI 27.09 kg/m²     Physical Exam  Vitals and nursing note reviewed.   Constitutional:       Appearance: Normal appearance. He is well-developed, well-groomed and overweight.      Interventions: Face mask in place.   HENT:      Head: Normocephalic and atraumatic.   Neck:      Vascular: No carotid bruit.      Trachea: Trachea and phonation normal. No tracheal tenderness.   Cardiovascular:      Rate and Rhythm: Normal rate and regular rhythm.      Pulses: Normal " pulses.      Heart sounds: Normal heart sounds, S1 normal and S2 normal. No murmur heard.      Pulmonary:      Effort: Pulmonary effort is normal.      Breath sounds: Normal breath sounds and air entry.   Abdominal:      General: Bowel sounds are normal.      Palpations: Abdomen is soft. There is no hepatomegaly.      Tenderness: There is no abdominal tenderness. There is no right CVA tenderness, left CVA tenderness, guarding or rebound. Negative signs include Iraheta's sign, Rovsing's sign, McBurney's sign, psoas sign and obturator sign.   Musculoskeletal:      Right shoulder: Tenderness, bony tenderness and crepitus present. No swelling. Normal range of motion. Normal strength. Normal pulse.      Left shoulder: Tenderness, bony tenderness and crepitus present. No swelling. Normal strength. Normal pulse.        Arms:       Cervical back: Neck supple.      Right lower leg: No edema.      Left lower leg: No edema.   Skin:     General: Skin is warm and dry.      Capillary Refill: Capillary refill takes less than 2 seconds.   Neurological:      Mental Status: He is alert and oriented to person, place, and time.      Sensory: Sensation is intact.      Motor: Motor function is intact.   Psychiatric:         Attention and Perception: Attention and perception normal.         Mood and Affect: Affect normal. Mood is anxious.         Speech: Speech normal.         Behavior: Behavior normal. Behavior is cooperative.         Thought Content: Thought content normal.         Cognition and Memory: Cognition and memory normal.         Judgment: Judgment normal.     I wore a facial mask, face shield, and gloves during this patient encounter.  Patient also wearing a surgical mask.  Hand hygiene performed before and after seeing the patient.     Result Review :                 Assessment and Plan    Diagnoses and all orders for this visit:    1. Chronic pain of both shoulders (Primary)  -     XR shoulder 2+ vw bilateral; Future    2.  ANYI (generalized anxiety disorder)  -     FLUoxetine (PROzac) 40 MG capsule; Take 1 capsule by mouth Daily.  Dispense: 30 capsule; Refill: 0    1. New and chronic bilateral shoulder pain: We will proceed with bilateral shoulder x-[facility] at [facility].  Will be notified of test results when completed.  Will consider referral to orthopedist and/or physical therapy in future if warranted.  2.  Chronic and uncontrolled generalized anxiety disorder: I will increase his Prozac to 40 mg daily.  New prescription was sent to pharmacy.  Will return to office in 1 month for reevaluation with annual physical examination.  Will have fasting lab work prior.      Follow Up   Return in about 4 weeks (around 2/7/2022), or labs prior, for Annual.  Patient was given instructions and counseling regarding his condition or for health maintenance advice. Please see specific information pulled into the AVS if appropriate.     [facility]  [address] [place] [id number]  Dept: [phone]  Dept Fax: [phone]  Loc: [phone]  Loc Fax: [phone]

## 2022-01-23 DIAGNOSIS — I10 ESSENTIAL HYPERTENSION: ICD-10-CM

## 2022-01-24 RX ORDER — LOSARTAN POTASSIUM 100 MG/1
TABLET ORAL
Qty: 30 TABLET | Refills: 0 | Status: SHIPPED | OUTPATIENT
Start: 2022-01-24 | End: 2022-02-22

## 2022-02-05 DIAGNOSIS — F41.1 GAD (GENERALIZED ANXIETY DISORDER): ICD-10-CM

## 2022-02-07 ENCOUNTER — OFFICE VISIT (OUTPATIENT)
Dept: ORTHOPEDIC SURGERY | Facility: CLINIC | Age: 57
End: 2022-02-07

## 2022-02-07 VITALS
SYSTOLIC BLOOD PRESSURE: 167 MMHG | DIASTOLIC BLOOD PRESSURE: 98 MMHG | HEIGHT: 74 IN | WEIGHT: 211 LBS | BODY MASS INDEX: 27.08 KG/M2 | HEART RATE: 67 BPM

## 2022-02-07 DIAGNOSIS — M75.20 BICEPS TENDINITIS, UNSPECIFIED LATERALITY: ICD-10-CM

## 2022-02-07 DIAGNOSIS — M75.81 TENDINITIS OF RIGHT ROTATOR CUFF: ICD-10-CM

## 2022-02-07 DIAGNOSIS — M75.40 SUBACROMIAL IMPINGEMENT, UNSPECIFIED LATERALITY: Primary | ICD-10-CM

## 2022-02-07 PROCEDURE — 99203 OFFICE O/P NEW LOW 30 MIN: CPT | Performed by: ORTHOPAEDIC SURGERY

## 2022-02-07 RX ORDER — METHYLPREDNISOLONE 4 MG/1
TABLET ORAL
Qty: 1 EACH | Refills: 0 | Status: SHIPPED | OUTPATIENT
Start: 2022-02-07 | End: 2022-02-21

## 2022-02-07 RX ORDER — FLUOXETINE HYDROCHLORIDE 40 MG/1
40 CAPSULE ORAL DAILY
Qty: 90 CAPSULE | Refills: 2 | Status: SHIPPED | OUTPATIENT
Start: 2022-02-07 | End: 2022-12-16

## 2022-02-07 NOTE — PROGRESS NOTES
The patient has consented to being recorded using KENAN.  Subjective:     Patient ID: Pj Steele is a 56 y.o. male.    Chief Complaint:  Bilateral shoulder pain, new patient  History of Present Illness  Pj Steele presents to clinic today for evaluation of bilateral shoulder pain, right worse than left.    The patient states that his right shoulder pain has been consistent for approximately 30 years, and his left shoulder pain has been present for approximately 2 months. He denies any prior injury to the onset of his pain. He rates his pain as a 2 to 7 out of 10, stabbing, shooting, grinding, and dull in nature. He notices stiffness and crepitus in range of motion in his bilateral shoulders. The patient localized his pain to the anterolateral and superior aspect of his shoulders. His pain is exacerbated with working, driving, and sleeping on his right side. His pain is mildly alleviated with rest and Tylenol. He notes associated numbness in his small and ring digits of his right hand when he is painting. He is right-hand dominate. The patient denies any previous injection or advanced imaging.     Of note, the patient is not diabetic. However, he does have hypertension with a recent blood pressure reading of 160/98 mmHg.      Social History     Occupational History   • Not on file   Tobacco Use   • Smoking status: Never Smoker   • Smokeless tobacco: Never Used   Vaping Use   • Vaping Use: Never used   Substance and Sexual Activity   • Alcohol use: Yes   • Drug use: Defer   • Sexual activity: Defer     Partners: Female      Past Medical History:   Diagnosis Date   • Depression     Mild   • Hypertension    • Polyp of colon, adenomatous    • Polyp of colon, hyperplastic      Past Surgical History:   Procedure Laterality Date   • CHOLECYSTECTOMY     • COLONOSCOPY N/A 4/24/2018    Procedure: COLONOSCOPY, polypectomy;  Surgeon: Josie Campos MD;  Location: [facility];  Service: Gastroenterology   • COLONOSCOPY N/A  "5/6/2021    Procedure: COLONOSCOPY;  Surgeon: Anais Beltre MD;  Location: [facility];  Service: Gastroenterology;  Laterality: N/A;  diverticulosis   • WRIST SURGERY         Family History   Problem Relation Age of Onset   • Leukemia Father    • Dementia Father    • Brain cancer Brother    • Colon cancer Mother          Review of Systems   Constitutional: Negative for chills, diaphoresis, fever and unexpected weight change.   HENT: Negative for hearing loss, nosebleeds, sore throat and tinnitus.    Eyes: Negative for pain and visual disturbance.   Respiratory: Negative for cough, shortness of breath and wheezing.    Cardiovascular: Negative for chest pain and palpitations.   Gastrointestinal: Negative for abdominal pain, diarrhea, nausea and vomiting.   Endocrine: Negative for cold intolerance, heat intolerance and polydipsia.   Genitourinary: Negative for difficulty urinating, dysuria and hematuria.   Musculoskeletal: Positive for arthralgias and myalgias. Negative for joint swelling.   Skin: Negative for rash and wound.   Allergic/Immunologic: Negative for environmental allergies.   Neurological: Negative for dizziness, syncope and numbness.   Hematological: Does not bruise/bleed easily.   Psychiatric/Behavioral: Negative for dysphoric mood and sleep disturbance. The patient is not nervous/anxious.            Objective:  Vitals:    02/07/22 1448   BP: 167/98   BP Location: Left arm   Pulse: 67   Weight: 95.7 kg (211 lb)   Height: 188 cm (74\")         02/07/22  1448   Weight: 95.7 kg (211 lb)     Body mass index is 27.09 kg/m².  Physical Exam    Vital signs reviewed.   General: No acute distress, alert and oriented  Eyes: conjunctiva clear; pupils equally round and reactive  ENT: external ears and nose atraumatic; oropharynx clear  CV: no peripheral edema  Resp: normal respiratory effort  Skin: no rashes or wounds; normal turgor  Psych: mood and affect appropriate; recent and remote memory intact          " Ortho Exam     Right Shoulder-    FF- Active- 170 degrees  Strength- 4+/5  ER- Active- 45 degrees  Strength- 4/5  IR to T12  Belly press test strength- 4+/5  Bear hug sign- Negative.  Empty can test- Minimally positive.  Neer's sign- Positive.  Collins- Positive.  Cross arm test- Moderately positive.  Tenderness over AC joint- Mildly positive.  Yergason- Mildly positive.  Speeds- Mildly positive.  Butte's- Equivocal.  Brisk cap refill to all digits, palpable 2+ radial pulse  Positive sensation to light touch palmar, dorsal aspects of small and index fingers and anatomic snuffbox right hand.    Left shoulder-    FF- Active- 175 degrees  Strength- 4+/5  ER- Active- 60 degrees  Strength- 4+/5  IR to T12  Belly press test strength- 4+/5  Empty can test- Positive.  Neer's sign- Negative.  Collins- Negative.  Cross arm test- Negative.  Tenderness over AC joint- Negative.  Yergason- Mildly positive.  Speeds- Mildly positive.  Butte's- Positive.  Brisk cap refill to all digits, palpable 2+ radial pulse.  Positive sensation to light touch palmar, dorsal aspects of small and ring fingers and anatomic snuffbox left hand.  Imaging:    XR Shoulder 2+ View Bilateral    Result Date: 1/10/2022  Normal bilateral shoulder series  This report was finalized on 1/10/2022 4:26 PM by Dr. Geoff Parks MD.      Review of outside 2 view x-rays of bilateral shoulders including review of images indicate no evidence of fracture, dislocation, or subluxation, minimal glenohumeral joint space narrowing appreciated with no significant osteophyte formation noted, no discrete evidence of humeral head elevation.  No comparison images available.      Assessment:        1. Subacromial impingement, unspecified laterality    2. Tendinitis of right rotator cuff    3. Biceps tendinitis, unspecified laterality           Plan:          1. Discussed treatment options at length with patient at today's visit.   2. We will proceed with MRI to evaluate for  rotator cuff pathology, particularly on the right side.  3. Oral Medrol Dosepak to improve with initial treatment of bursitis and monitor his progress.  4. Follow up: after MRI results to review and discuss further treatment options.      Pj Steele was in agreement with plan and had all questions answered.     Orders:  Orders Placed This Encounter   Procedures   • MRI Shoulder Right Without Contrast       Medications:  New Medications Ordered This Visit   Medications   • methylPREDNISolone (MEDROL) 4 MG dose pack     Sig: Take as directed on package instructions.     Dispense:  1 each     Refill:  0       Followup:  Return for review of MRI results.    Diagnoses and all orders for this visit:    1. Subacromial impingement, unspecified laterality (Primary)  -     MRI Shoulder Right Without Contrast; Future    2. Tendinitis of right rotator cuff  -     MRI Shoulder Right Without Contrast; Future    3. Biceps tendinitis, unspecified laterality  -     MRI Shoulder Right Without Contrast; Future    Other orders  -     methylPREDNISolone (MEDROL) 4 MG dose pack; Take as directed on package instructions.  Dispense: 1 each; Refill: 0          Dictated utilizing Dragon dictation     Transcribed from ambient dictation for Parish Yin MD by Sue Villarreal.  02/08/22   08:53 EST    Patient verbalized consent to the visit recording.  I have personally performed the services described in this document as transcribed by the above individual, and it is both accurate and complete.  Parish Yin MD  2/14/2022  21:08 EST

## 2022-02-08 DIAGNOSIS — E78.00 HYPERCHOLESTEROLEMIA: ICD-10-CM

## 2022-02-08 DIAGNOSIS — Z00.00 ENCOUNTER FOR ANNUAL PHYSICAL EXAM: Primary | ICD-10-CM

## 2022-02-08 DIAGNOSIS — E78.2 MIXED HYPERLIPIDEMIA: ICD-10-CM

## 2022-02-08 DIAGNOSIS — I10 ESSENTIAL HYPERTENSION: ICD-10-CM

## 2022-02-08 DIAGNOSIS — Z12.5 SCREENING FOR PROSTATE CANCER: ICD-10-CM

## 2022-02-10 ENCOUNTER — LAB (OUTPATIENT)
Dept: LAB | Facility: HOSPITAL | Age: 57
End: 2022-02-10

## 2022-02-10 DIAGNOSIS — E78.2 MIXED HYPERLIPIDEMIA: ICD-10-CM

## 2022-02-10 DIAGNOSIS — E78.00 HYPERCHOLESTEROLEMIA: ICD-10-CM

## 2022-02-10 DIAGNOSIS — Z00.00 ENCOUNTER FOR ANNUAL PHYSICAL EXAM: ICD-10-CM

## 2022-02-10 DIAGNOSIS — Z12.5 SCREENING FOR PROSTATE CANCER: ICD-10-CM

## 2022-02-10 DIAGNOSIS — I10 ESSENTIAL HYPERTENSION: ICD-10-CM

## 2022-02-10 LAB
ALBUMIN SERPL-MCNC: 4.9 G/DL (ref 3.5–5.2)
ALBUMIN/GLOB SERPL: 2.5 G/DL
ALP SERPL-CCNC: 48 U/L (ref 39–117)
ALT SERPL W P-5'-P-CCNC: 21 U/L (ref 1–41)
ANION GAP SERPL CALCULATED.3IONS-SCNC: 12 MMOL/L (ref 5–15)
AST SERPL-CCNC: 15 U/L (ref 1–40)
BASOPHILS # BLD AUTO: 0.02 10*3/MM3 (ref 0–0.2)
BASOPHILS NFR BLD AUTO: 0.2 % (ref 0–1.5)
BILIRUB SERPL-MCNC: 1.1 MG/DL (ref 0–1.2)
BUN SERPL-MCNC: 16 MG/DL (ref 6–20)
BUN/CREAT SERPL: 17 (ref 7–25)
CALCIUM SPEC-SCNC: 9.3 MG/DL (ref 8.6–10.5)
CHLORIDE SERPL-SCNC: 105 MMOL/L (ref 98–107)
CHOLEST SERPL-MCNC: 276 MG/DL (ref 0–200)
CO2 SERPL-SCNC: 24 MMOL/L (ref 22–29)
CREAT SERPL-MCNC: 0.94 MG/DL (ref 0.76–1.27)
DEPRECATED RDW RBC AUTO: 43.9 FL (ref 37–54)
EOSINOPHIL # BLD AUTO: 0.01 10*3/MM3 (ref 0–0.4)
EOSINOPHIL NFR BLD AUTO: 0.1 % (ref 0.3–6.2)
ERYTHROCYTE [DISTWIDTH] IN BLOOD BY AUTOMATED COUNT: 13 % (ref 12.3–15.4)
GFR SERPL CREATININE-BSD FRML MDRD: 83 ML/MIN/1.73
GLOBULIN UR ELPH-MCNC: 2 GM/DL
GLUCOSE SERPL-MCNC: 103 MG/DL (ref 65–99)
HCT VFR BLD AUTO: 48 % (ref 37.5–51)
HDLC SERPL-MCNC: 51 MG/DL (ref 40–60)
HGB BLD-MCNC: 15.7 G/DL (ref 13–17.7)
IMM GRANULOCYTES # BLD AUTO: 0.03 10*3/MM3 (ref 0–0.05)
IMM GRANULOCYTES NFR BLD AUTO: 0.3 % (ref 0–0.5)
LDLC SERPL CALC-MCNC: 207 MG/DL (ref 0–100)
LDLC/HDLC SERPL: 4.02 {RATIO}
LYMPHOCYTES # BLD AUTO: 0.94 10*3/MM3 (ref 0.7–3.1)
LYMPHOCYTES NFR BLD AUTO: 8.1 % (ref 19.6–45.3)
MCH RBC QN AUTO: 29.5 PG (ref 26.6–33)
MCHC RBC AUTO-ENTMCNC: 32.7 G/DL (ref 31.5–35.7)
MCV RBC AUTO: 90.2 FL (ref 79–97)
MONOCYTES # BLD AUTO: 0.65 10*3/MM3 (ref 0.1–0.9)
MONOCYTES NFR BLD AUTO: 5.6 % (ref 5–12)
NEUTROPHILS NFR BLD AUTO: 85.7 % (ref 42.7–76)
NEUTROPHILS NFR BLD AUTO: 9.93 10*3/MM3 (ref 1.7–7)
NRBC BLD AUTO-RTO: 0 /100 WBC (ref 0–0.2)
PLATELET # BLD AUTO: 281 10*3/MM3 (ref 140–450)
PMV BLD AUTO: 9.6 FL (ref 6–12)
POTASSIUM SERPL-SCNC: 4.4 MMOL/L (ref 3.5–5.2)
PROT SERPL-MCNC: 6.9 G/DL (ref 6–8.5)
PSA SERPL-MCNC: 0.69 NG/ML (ref 0–4)
RBC # BLD AUTO: 5.32 10*6/MM3 (ref 4.14–5.8)
SODIUM SERPL-SCNC: 141 MMOL/L (ref 136–145)
TRIGL SERPL-MCNC: 101 MG/DL (ref 0–150)
VLDLC SERPL-MCNC: 18 MG/DL (ref 5–40)
WBC NRBC COR # BLD: 11.58 10*3/MM3 (ref 3.4–10.8)

## 2022-02-10 PROCEDURE — G0103 PSA SCREENING: HCPCS

## 2022-02-10 PROCEDURE — 85025 COMPLETE CBC W/AUTO DIFF WBC: CPT

## 2022-02-10 PROCEDURE — 36415 COLL VENOUS BLD VENIPUNCTURE: CPT

## 2022-02-10 PROCEDURE — 80053 COMPREHEN METABOLIC PANEL: CPT

## 2022-02-10 PROCEDURE — 80061 LIPID PANEL: CPT

## 2022-02-16 RX ORDER — MELOXICAM 15 MG/1
15 TABLET ORAL DAILY
Qty: 30 TABLET | Refills: 0 | Status: SHIPPED | OUTPATIENT
Start: 2022-02-16 | End: 2022-03-14

## 2022-02-21 ENCOUNTER — OFFICE VISIT (OUTPATIENT)
Dept: FAMILY MEDICINE CLINIC | Facility: CLINIC | Age: 57
End: 2022-02-21

## 2022-02-21 VITALS
HEART RATE: 74 BPM | WEIGHT: 209 LBS | TEMPERATURE: 97.8 F | HEIGHT: 74 IN | BODY MASS INDEX: 26.82 KG/M2 | SYSTOLIC BLOOD PRESSURE: 148 MMHG | OXYGEN SATURATION: 100 % | RESPIRATION RATE: 15 BRPM | DIASTOLIC BLOOD PRESSURE: 86 MMHG

## 2022-02-21 DIAGNOSIS — F33.0 MILD EPISODE OF RECURRENT MAJOR DEPRESSIVE DISORDER: ICD-10-CM

## 2022-02-21 DIAGNOSIS — E78.2 MIXED HYPERLIPIDEMIA: ICD-10-CM

## 2022-02-21 DIAGNOSIS — Z00.00 ANNUAL PHYSICAL EXAM: Primary | ICD-10-CM

## 2022-02-21 DIAGNOSIS — I10 ESSENTIAL HYPERTENSION: ICD-10-CM

## 2022-02-21 PROCEDURE — 99396 PREV VISIT EST AGE 40-64: CPT | Performed by: PHYSICIAN ASSISTANT

## 2022-02-21 RX ORDER — CHLORTHALIDONE 25 MG/1
25 TABLET ORAL DAILY
Qty: 30 TABLET | Refills: 2 | Status: SHIPPED | OUTPATIENT
Start: 2022-02-21 | End: 2022-05-23

## 2022-02-21 NOTE — PROGRESS NOTES
"Chief Complaint  Annual Exam, Hypertension (management), Hyperlipidemia (management), and Depression (management)    Subjective          Pj Steele presents to [facility]  History of Present Illness  Pj is a 56 year old female who presents annual physical examination with hypertension, hyperlipidemia and depression management.  Pj states his diet has been improving.  States he has been trying to eat healthier.  Sleep has been normal.  He has not been exercising due to shoulder pain.  Currently seeing Dr. Yin, orthopedist, for this.  Will be having an MRI later this week.  Has been compliant with his losartan medication.  States his blood pressure has been running high as well.  He got 167/109 the other day.  Denied any fevers, chills, upper respiratory symptoms, chest pain, shortness of air, cough, wheezing, abdominal pain, urinary symptoms, GI upset or swelling of ankles.  Bowel movements have been slightly constipated at times.  He has been trying to drink beet juice and increase fiber intake.    Review of Systems   All other systems reviewed and are negative.    Objective   Vital Signs:   /86 (BP Location: Right arm, Patient Position: Sitting, Cuff Size: Adult)   Pulse 74   Temp 97.8 °F (36.6 °C)   Resp 15   Ht 188 cm (74\")   Wt 94.8 kg (209 lb)   SpO2 100%   BMI 26.83 kg/m²     Physical Exam  Vitals and nursing note reviewed.   Constitutional:       Appearance: Normal appearance. He is well-developed, well-groomed and overweight.      Interventions: Face mask in place.   HENT:      Head: Normocephalic and atraumatic.      Jaw: There is normal jaw occlusion.      Right Ear: Hearing, tympanic membrane, ear canal and external ear normal.      Left Ear: Hearing, tympanic membrane, ear canal and external ear normal.      Nose: Nose normal.      Right Sinus: No maxillary sinus tenderness or frontal sinus tenderness.      Left Sinus: No maxillary sinus tenderness " or frontal sinus tenderness.      Mouth/Throat:      Lips: Pink.      Mouth: Mucous membranes are moist.      Dentition: Normal dentition.      Tongue: No lesions.      Pharynx: Oropharynx is clear. Uvula midline.      Tonsils: No tonsillar exudate.   Eyes:      General: Lids are normal.      Conjunctiva/sclera: Conjunctivae normal.      Pupils: Pupils are equal, round, and reactive to light.   Neck:      Thyroid: No thyroid mass, thyromegaly or thyroid tenderness.      Vascular: No carotid bruit.      Trachea: Trachea and phonation normal. No tracheal tenderness.   Cardiovascular:      Rate and Rhythm: Normal rate and regular rhythm.      Pulses: Normal pulses.      Heart sounds: Normal heart sounds, S1 normal and S2 normal. No murmur heard.      Pulmonary:      Effort: Pulmonary effort is normal.      Breath sounds: Normal breath sounds and air entry.   Abdominal:      General: Bowel sounds are normal.      Palpations: Abdomen is soft.      Tenderness: There is no abdominal tenderness. There is no right CVA tenderness, left CVA tenderness, guarding or rebound. Negative signs include Iraheta's sign, Rovsing's sign, McBurney's sign, psoas sign and obturator sign.   Genitourinary:     Comments: Patient deferred exam  Musculoskeletal:      Cervical back: Neck supple.      Right lower leg: No edema.      Left lower leg: No edema.   Lymphadenopathy:      Cervical: No cervical adenopathy.      Right cervical: No superficial, deep or posterior cervical adenopathy.     Left cervical: No superficial, deep or posterior cervical adenopathy.   Skin:     General: Skin is warm and dry.      Capillary Refill: Capillary refill takes less than 2 seconds.   Neurological:      Mental Status: He is alert and oriented to person, place, and time.      Deep Tendon Reflexes:      Reflex Scores:       Patellar reflexes are 2+ on the right side and 2+ on the left side.  Psychiatric:         Attention and Perception: Attention and perception  normal.         Mood and Affect: Mood and affect normal.         Speech: Speech normal.         Behavior: Behavior is cooperative.         Thought Content: Thought content normal.         Cognition and Memory: Cognition and memory normal.         Judgment: Judgment normal.     I wore a facial mask, face shield, and gloves during this patient encounter.  Patient also wearing a surgical mask.  Hand hygiene performed before and after seeing the patient.     Result Review :     CMP    CMP 2/10/22   Glucose 103 (A)   BUN 16   Creatinine 0.94   eGFR Non African Am 83   Sodium 141   Potassium 4.4   Chloride 105   Calcium 9.3   Albumin 4.90   Total Bilirubin 1.1   Alkaline Phosphatase 48   AST (SGOT) 15   ALT (SGPT) 21   (A) Abnormal value            CBC w/diff    CBC w/Diff 2/10/22   WBC 11.58 (A)   RBC 5.32   Hemoglobin 15.7   Hematocrit 48.0   MCV 90.2   MCH 29.5   MCHC 32.7   RDW 13.0   Platelets 281   Neutrophil Rel % 85.7 (A)   Immature Granulocyte Rel % 0.3   Lymphocyte Rel % 8.1 (A)   Monocyte Rel % 5.6   Eosinophil Rel % 0.1 (A)   Basophil Rel % 0.2   (A) Abnormal value            Lipid Panel    Lipid Panel 2/10/22   Total Cholesterol 276 (A)   Triglycerides 101   HDL Cholesterol 51   VLDL Cholesterol 18   LDL Cholesterol  207 (A)   LDL/HDL Ratio 4.02   (A) Abnormal value                          Assessment and Plan    Diagnoses and all orders for this visit:    1. Annual physical exam (Primary)    2. Mixed hyperlipidemia    3. Essential hypertension  -     chlorthalidone (HYGROTON) 25 MG tablet; Take 1 tablet by mouth Daily.  Dispense: 30 tablet; Refill: 2    4. Mild episode of recurrent major depressive disorder (HCC)    1.  Annual physical examination with hyperlipidemia: I have reviewed above blood work with him at office visit today.  Stressed importance of eating healthier by decreasing fatty food, carbohydrates and fatty food in diet.  He  will try to increase physical activity.  Wants to try diet and  exercise before starting medication.  Will return to office in 6 months for reevaluation with fasting labs.  If no improvement will need to start statin medication.  2.  Chronic and uncontrolled hypertension: I have rechecked blood pressure at office visit today and got 148/86 in right arm and 150/92 in left arm.  I  will add chlorthalidone to his current losartan medication.  Pj will return to office in 2 weeks for blood pressure check.  I have asked him to bring his home blood pressure cuff to office as well.  He voiced understanding.  3.  Chronic and stable major depressive disorder: Doing well with the fluoxetine medication.  No refills are needed at this time.  Will reevaluate at office visit in 6 months.    Patient Counseling:  --Nutrition: Stressed importance of moderation in sodium/caffeine intake, saturated fat and cholesterol.  Discussed caloric balance, sufficient intake of fresh fruits, vegetables, fiber,   calcium, iron.  --Discussed the new recommendation against daily use of baby aspirin for primary prevention in low risk patients.  --Exercise: Stressed the importance of regular exercise by incorporating into daily routine.    --Substance Abuse: Discussed cessation/primary prevention of tobacco, alcohol, or other drug use; driving or other dangerous activities under the influence.    --Dental health: Discussed importance of regular tooth brushing, flossing, and dental visits.  -- Suggested having eyes and vision checked if needed or past due.  --Immunizations reviewed.  --Discussed benefits of screening colonoscopy.        Follow Up   Return in about 6 months (around 8/21/2022), or HTN check in 2 weeks-only- Depression/HTN.  Patient was given instructions and counseling regarding his condition or for health maintenance advice. Please see specific information pulled into the AVS if appropriate.     [facility]  [address] [facility] [address]  Dept: [phone]  Dept Fax: [phone]  Loc: [phone]  Loc Fax: [phone]

## 2022-02-22 ENCOUNTER — HOSPITAL ENCOUNTER (OUTPATIENT)
Dept: MRI IMAGING | Facility: HOSPITAL | Age: 57
Discharge: HOME | End: 2022-02-22
Admitting: ORTHOPAEDIC SURGERY

## 2022-02-22 DIAGNOSIS — M75.20 BICEPS TENDINITIS, UNSPECIFIED LATERALITY: ICD-10-CM

## 2022-02-22 DIAGNOSIS — I10 ESSENTIAL HYPERTENSION: ICD-10-CM

## 2022-02-22 DIAGNOSIS — M75.40 SUBACROMIAL IMPINGEMENT, UNSPECIFIED LATERALITY: ICD-10-CM

## 2022-02-22 DIAGNOSIS — M75.81 TENDINITIS OF RIGHT ROTATOR CUFF: ICD-10-CM

## 2022-02-22 PROCEDURE — 73221 MRI JOINT UPR EXTREM W/O DYE: CPT

## 2022-02-22 RX ORDER — LOSARTAN POTASSIUM 100 MG/1
TABLET ORAL
Qty: 30 TABLET | Refills: 0 | Status: SHIPPED | OUTPATIENT
Start: 2022-02-22 | End: 2022-03-21

## 2022-02-24 ENCOUNTER — HOSPITAL ENCOUNTER (OUTPATIENT)
Dept: MRI IMAGING | Facility: HOSPITAL | Age: 57
End: 2022-02-24

## 2022-02-28 ENCOUNTER — OFFICE VISIT (OUTPATIENT)
Dept: ORTHOPEDIC SURGERY | Facility: CLINIC | Age: 57
End: 2022-02-28

## 2022-02-28 VITALS — BODY MASS INDEX: 26.56 KG/M2 | WEIGHT: 207 LBS | HEIGHT: 74 IN

## 2022-02-28 DIAGNOSIS — M75.81 TENDINITIS OF RIGHT ROTATOR CUFF: Primary | ICD-10-CM

## 2022-02-28 DIAGNOSIS — M75.111 INCOMPLETE TEAR OF RIGHT ROTATOR CUFF, UNSPECIFIED WHETHER TRAUMATIC: ICD-10-CM

## 2022-02-28 DIAGNOSIS — M75.41 SUBACROMIAL IMPINGEMENT OF RIGHT SHOULDER: ICD-10-CM

## 2022-02-28 PROCEDURE — 99213 OFFICE O/P EST LOW 20 MIN: CPT | Performed by: ORTHOPAEDIC SURGERY

## 2022-03-07 ENCOUNTER — CLINICAL SUPPORT (OUTPATIENT)
Dept: FAMILY MEDICINE CLINIC | Facility: CLINIC | Age: 57
End: 2022-03-07

## 2022-03-07 VITALS — SYSTOLIC BLOOD PRESSURE: 116 MMHG | DIASTOLIC BLOOD PRESSURE: 74 MMHG

## 2022-03-14 RX ORDER — MELOXICAM 15 MG/1
TABLET ORAL
Qty: 30 TABLET | Refills: 0 | Status: SHIPPED | OUTPATIENT
Start: 2022-03-14 | End: 2022-04-11

## 2022-03-14 NOTE — TELEPHONE ENCOUNTER
Rx Refill Note  Requested Prescriptions     Pending Prescriptions Disp Refills    meloxicam (MOBIC) 15 MG tablet [Pharmacy Med Name: MELOXICAM 15 MG TABLET] 30 tablet 0     Sig: TAKE ONE TABLET BY MOUTH DAILY      Last office visit with prescribing clinician: 2/28/2022      Next office visit with prescribing clinician: Visit date not found   Last Filled: 2.16.22      Assessment       1. Tendinitis of right rotator cuff    2. Subacromial impingement of right shoulder    3. Incomplete tear of right rotator cuff, unspecified whether traumatic        Date of Surgery:      Cami Aviles MA  03/14/22, 08:44 EDT    {TIP  Encounters:    {TIP  Please add Last Relevant Lab Date if appropriate:  {TIP  Is Refill Pharmacy correct?:

## 2022-03-20 DIAGNOSIS — I10 ESSENTIAL HYPERTENSION: ICD-10-CM

## 2022-03-21 RX ORDER — LOSARTAN POTASSIUM 100 MG/1
TABLET ORAL
Qty: 90 TABLET | Refills: 2 | Status: SHIPPED | OUTPATIENT
Start: 2022-03-21 | End: 2022-12-16

## 2022-04-11 RX ORDER — MELOXICAM 15 MG/1
TABLET ORAL
Qty: 30 TABLET | Refills: 0 | Status: SHIPPED | OUTPATIENT
Start: 2022-04-11 | End: 2022-05-11

## 2022-04-11 NOTE — TELEPHONE ENCOUNTER
Rx Refill Note  Requested Prescriptions     Pending Prescriptions Disp Refills    meloxicam (MOBIC) 15 MG tablet [Pharmacy Med Name: MELOXICAM 15 MG TABLET] 30 tablet 0     Sig: TAKE ONE TABLET BY MOUTH DAILY      Last office visit with prescribing clinician: 2/28/2022      Next office visit with prescribing clinician: Visit date not found   Last Filled:03.14.2022    1. Tendinitis of right rotator cuff    2. Subacromial impingement of right shoulder    3. Incomplete tear of right rotator cuff, unspecified whether traumatic          Diane Ann MA  04/11/22, 09:14 EDT    {TIP  Encounters:    {TIP  Please add Last Relevant Lab Date if appropriate:  {TIP  Is Refill Pharmacy correct?:

## 2022-05-11 RX ORDER — MELOXICAM 15 MG/1
TABLET ORAL
Qty: 30 TABLET | Refills: 0 | Status: SHIPPED | OUTPATIENT
Start: 2022-05-11 | End: 2022-06-16

## 2022-05-11 NOTE — TELEPHONE ENCOUNTER
Rx Refill Note  Requested Prescriptions     Pending Prescriptions Disp Refills    meloxicam (MOBIC) 15 MG tablet [Pharmacy Med Name: MELOXICAM 15 MG TABLET] 30 tablet 0     Sig: TAKE ONE TABLET BY MOUTH DAILY      Last office visit with prescribing clinician: 2/28/2022      Next office visit with prescribing clinician: darrel  Last Filled:04.11.22      No diagnosis found. Tendinitis of right rotator cuff   Date of Surgery:      Sil Fink MA  05/11/22, 08:21 EDT    {TIP  Encounters:    {TIP  Please add Last Relevant Lab Date if appropriate:  {TIP  Is Refill Pharmacy correct?:

## 2022-05-23 DIAGNOSIS — I10 ESSENTIAL HYPERTENSION: ICD-10-CM

## 2022-05-23 RX ORDER — CHLORTHALIDONE 25 MG/1
TABLET ORAL
Qty: 30 TABLET | Refills: 2 | Status: SHIPPED | OUTPATIENT
Start: 2022-05-23 | End: 2022-08-26

## 2022-05-26 ENCOUNTER — TRANSCRIBE ORDERS (OUTPATIENT)
Dept: ADMINISTRATIVE | Facility: HOSPITAL | Age: 57
End: 2022-05-26

## 2022-05-26 ENCOUNTER — LAB (OUTPATIENT)
Dept: LAB | Facility: HOSPITAL | Age: 57
End: 2022-05-26

## 2022-05-26 DIAGNOSIS — N50.9 DISEASE OF SEMINAL VESICLE: ICD-10-CM

## 2022-05-26 DIAGNOSIS — E34.9 ENDOCRINE DISORDER RELATED TO PUBERTY: ICD-10-CM

## 2022-05-26 DIAGNOSIS — E07.9 DISEASE OF THYROID GLAND: ICD-10-CM

## 2022-05-26 DIAGNOSIS — E07.9 DISEASE OF THYROID GLAND: Primary | ICD-10-CM

## 2022-05-26 LAB
BASOPHILS # BLD AUTO: 0.07 10*3/MM3 (ref 0–0.2)
BASOPHILS NFR BLD AUTO: 0.9 % (ref 0–1.5)
DEPRECATED RDW RBC AUTO: 39.7 FL (ref 37–54)
EOSINOPHIL # BLD AUTO: 0.25 10*3/MM3 (ref 0–0.4)
EOSINOPHIL NFR BLD AUTO: 3.1 % (ref 0.3–6.2)
ERYTHROCYTE [DISTWIDTH] IN BLOOD BY AUTOMATED COUNT: 12.3 % (ref 12.3–15.4)
HCT VFR BLD AUTO: 45.6 % (ref 37.5–51)
HGB BLD-MCNC: 15.1 G/DL (ref 13–17.7)
IMM GRANULOCYTES # BLD AUTO: 0.02 10*3/MM3 (ref 0–0.05)
IMM GRANULOCYTES NFR BLD AUTO: 0.3 % (ref 0–0.5)
LYMPHOCYTES # BLD AUTO: 1.34 10*3/MM3 (ref 0.7–3.1)
LYMPHOCYTES NFR BLD AUTO: 16.9 % (ref 19.6–45.3)
MCH RBC QN AUTO: 28.7 PG (ref 26.6–33)
MCHC RBC AUTO-ENTMCNC: 33.1 G/DL (ref 31.5–35.7)
MCV RBC AUTO: 86.7 FL (ref 79–97)
MONOCYTES # BLD AUTO: 0.76 10*3/MM3 (ref 0.1–0.9)
MONOCYTES NFR BLD AUTO: 9.6 % (ref 5–12)
NEUTROPHILS NFR BLD AUTO: 5.5 10*3/MM3 (ref 1.7–7)
NEUTROPHILS NFR BLD AUTO: 69.2 % (ref 42.7–76)
PLATELET # BLD AUTO: 303 10*3/MM3 (ref 140–450)
PMV BLD AUTO: 8.2 FL (ref 6–12)
RBC # BLD AUTO: 5.26 10*6/MM3 (ref 4.14–5.8)
WBC NRBC COR # BLD: 7.94 10*3/MM3 (ref 3.4–10.8)

## 2022-05-26 PROCEDURE — 82670 ASSAY OF TOTAL ESTRADIOL: CPT

## 2022-05-26 PROCEDURE — 84439 ASSAY OF FREE THYROXINE: CPT

## 2022-05-26 PROCEDURE — 84480 ASSAY TRIIODOTHYRONINE (T3): CPT

## 2022-05-26 PROCEDURE — 84403 ASSAY OF TOTAL TESTOSTERONE: CPT

## 2022-05-26 PROCEDURE — 36415 COLL VENOUS BLD VENIPUNCTURE: CPT

## 2022-05-26 PROCEDURE — 80050 GENERAL HEALTH PANEL: CPT

## 2022-05-27 LAB
ALBUMIN SERPL-MCNC: 4.9 G/DL (ref 3.5–5.2)
ALBUMIN/GLOB SERPL: 2.7 G/DL
ALP SERPL-CCNC: 50 U/L (ref 39–117)
ALT SERPL W P-5'-P-CCNC: 30 U/L (ref 1–41)
ANION GAP SERPL CALCULATED.3IONS-SCNC: 13.7 MMOL/L (ref 5–15)
AST SERPL-CCNC: 22 U/L (ref 1–40)
BILIRUB SERPL-MCNC: 0.5 MG/DL (ref 0–1.2)
BUN SERPL-MCNC: 14 MG/DL (ref 6–20)
BUN/CREAT SERPL: 13.9 (ref 7–25)
CALCIUM SPEC-SCNC: 9.2 MG/DL (ref 8.6–10.5)
CHLORIDE SERPL-SCNC: 96 MMOL/L (ref 98–107)
CO2 SERPL-SCNC: 27.3 MMOL/L (ref 22–29)
CREAT SERPL-MCNC: 1.01 MG/DL (ref 0.76–1.27)
EGFRCR SERPLBLD CKD-EPI 2021: 86.7 ML/MIN/1.73
ESTRADIOL SERPL HS-MCNC: 55.2 PG/ML
GLOBULIN UR ELPH-MCNC: 1.8 GM/DL
GLUCOSE SERPL-MCNC: 70 MG/DL (ref 65–99)
POTASSIUM SERPL-SCNC: 4.7 MMOL/L (ref 3.5–5.2)
PROT SERPL-MCNC: 6.7 G/DL (ref 6–8.5)
SODIUM SERPL-SCNC: 137 MMOL/L (ref 136–145)
T3 SERPL-MCNC: 100 NG/DL (ref 80–200)
T4 FREE SERPL-MCNC: 1.1 NG/DL (ref 0.93–1.7)
T4 SERPL-MCNC: 6.48 MCG/DL (ref 4.5–11.7)
TESTOST SERPL-MCNC: 1286 NG/DL (ref 193–740)
TSH SERPL DL<=0.05 MIU/L-ACNC: 1.84 UIU/ML (ref 0.27–4.2)

## 2022-06-08 ENCOUNTER — OFFICE VISIT (OUTPATIENT)
Dept: FAMILY MEDICINE CLINIC | Facility: CLINIC | Age: 57
End: 2022-06-08

## 2022-06-08 VITALS
RESPIRATION RATE: 14 BRPM | BODY MASS INDEX: 27.34 KG/M2 | OXYGEN SATURATION: 99 % | HEART RATE: 76 BPM | WEIGHT: 213 LBS | TEMPERATURE: 98.6 F | SYSTOLIC BLOOD PRESSURE: 120 MMHG | HEIGHT: 74 IN | DIASTOLIC BLOOD PRESSURE: 80 MMHG

## 2022-06-08 DIAGNOSIS — I10 ESSENTIAL HYPERTENSION: ICD-10-CM

## 2022-06-08 DIAGNOSIS — F33.0 MILD EPISODE OF RECURRENT MAJOR DEPRESSIVE DISORDER: ICD-10-CM

## 2022-06-08 DIAGNOSIS — E78.2 MIXED HYPERLIPIDEMIA: Primary | ICD-10-CM

## 2022-06-08 PROCEDURE — 99214 OFFICE O/P EST MOD 30 MIN: CPT | Performed by: PHYSICIAN ASSISTANT

## 2022-06-08 NOTE — PROGRESS NOTES
"Chief Complaint  Hypertension and Hyperlipidemia    Subjective          Pj Steele presents to [facility]  History of Present Illness  Pj is a 57 year old male who presents for Hyperlipidemia and Hypertension management.  He has been seeing a men's clinic in [facility] due to low testosterone.  He had testosterone pellets injected over the past month or so.  States his fatigue has improved.  Had blood work recently at hospital that showed elevated testosterone levels.  States he has talked to them about the levels and they said everything was fine.  He has been trying to eat healthier by eating oatmeal and fruits/vegetables.  Jackie has been drinking a smoothie daily with protein and vegetables.  Has gained 4 pounds since February 21, 2022.  Sleep has been normal for him.  States he is a light sleeper and hears everything.  Exercise is work-related.  Taking the Prozac as directed for his depression.  Denied any suicidal or homicidal ideation.  Bowel movements have been daily without dark black tarry stools.  Pj denied any current chest pain, shortness of air, cough, wheezing, headaches, GI upset or swelling of ankles.  Recent lab work did not have fasting cholesterol.  States he had a random cholesterol collected at work today that was non fasting.  Results were not available for my review.     Review of Systems   All other systems reviewed and are negative.    Objective   Vital Signs:   /80   Pulse 76   Temp 98.6 °F (37 °C)   Resp 14   Ht 188 cm (74\")   Wt 96.6 kg (213 lb)   SpO2 99%   BMI 27.35 kg/m²     Physical Exam  Vitals and nursing note reviewed.   Constitutional:       Appearance: Normal appearance. He is well-developed, well-groomed and overweight.      Interventions: Face mask in place.   HENT:      Head: Normocephalic and atraumatic.   Neck:      Thyroid: No thyroid mass, thyromegaly or thyroid tenderness.      Vascular: No carotid bruit.      " Trachea: Trachea and phonation normal. No tracheal tenderness.   Cardiovascular:      Rate and Rhythm: Normal rate and regular rhythm.      Pulses: Normal pulses.      Heart sounds: Normal heart sounds, S1 normal and S2 normal. No murmur heard.  Pulmonary:      Effort: Pulmonary effort is normal.      Breath sounds: Normal breath sounds and air entry.   Abdominal:      General: Bowel sounds are normal.      Palpations: Abdomen is soft. There is no hepatomegaly.      Tenderness: There is no abdominal tenderness. There is no right CVA tenderness, left CVA tenderness, guarding or rebound. Negative signs include Iraheta's sign, Rovsing's sign, McBurney's sign, psoas sign and obturator sign.   Musculoskeletal:      Cervical back: Neck supple.      Right lower leg: No edema.      Left lower leg: No edema.   Skin:     General: Skin is warm and dry.      Capillary Refill: Capillary refill takes less than 2 seconds.   Neurological:      Mental Status: He is alert and oriented to person, place, and time.   Psychiatric:         Attention and Perception: Attention and perception normal.         Mood and Affect: Mood and affect normal.         Speech: Speech normal.         Behavior: Behavior normal. Behavior is cooperative.         Thought Content: Thought content normal.         Cognition and Memory: Cognition and memory normal.         Judgment: Judgment normal.     I wore a facial mask, face shield, and gloves during this patient encounter.  Patient also wearing a surgical mask.  Hand hygiene performed before and after seeing the patient.     Result Review :     Common labs    Common Labsle 2/10/22 2/10/22 2/10/22 2/10/22 5/26/22 5/26/22    0402 0402 0402 0402 1512 1512   Glucose   103 (A)   70   BUN   16   14   Creatinine   0.94   1.01   eGFR Non African Am   83      Sodium   141   137   Potassium   4.4   4.7   Chloride   105   96 (A)   Calcium   9.3   9.2   Albumin   4.90   4.90   Total Bilirubin   1.1   0.5   Alkaline  Phosphatase   48   50   AST (SGOT)   15   22   ALT (SGPT)   21   30   WBC 11.58 (A)    7.94    Hemoglobin 15.7    15.1    Hematocrit 48.0    45.6    Platelets 281    303    Total Cholesterol  276 (A)       Triglycerides  101       HDL Cholesterol  51       LDL Cholesterol   207 (A)       PSA    0.691     (A) Abnormal value                      Assessment and Plan    Diagnoses and all orders for this visit:    1. Mixed hyperlipidemia (Primary)  -     Comprehensive Metabolic Panel  -     CBC & Differential  -     Lipid Panel With LDL / HDL Ratio    2. Essential hypertension    3. Mild episode of recurrent major depressive disorder (HCC)    1.  Chronic and stable hyperlipidemia: Recent lab work was non fasting.  I have given him written orders for CBC, CMP and a lipid profile.  He will have this [facility] in the next 2-3 months.  Stressed the importance of continue to eat healthy and exercise regularly.  2.  Chronic and stable hypertension: Blood pressure was in therapeutic range.  Have rechecked and got 120/78 in left arm.  Pj will continue current blood pressure medication at home as directed.  3.  Chronic and stable depression: Doing well with his Prozac medication.  No refills are needed.  Will reevaluate at office visit in 6 months.    Follow Up   Return in about 6 months (around 12/8/2022), or HTN labs at [place].  Patient was given instructions and counseling regarding his condition or for health maintenance advice. Please see specific information pulled into the AVS if appropriate.     [facility]  [address] [place] [id number]  Dept: [phone]  Dept Fax: [phone]  Loc: [phone]  Loc Fax: [phone]        Answers for HPI/ROS submitted by the patient on 6/1/2022  What is the primary reason for your visit?: High Blood Pressure

## 2022-06-16 RX ORDER — MELOXICAM 15 MG/1
TABLET ORAL
Qty: 30 TABLET | Refills: 0 | Status: SHIPPED | OUTPATIENT
Start: 2022-06-16 | End: 2022-07-19

## 2022-06-16 NOTE — TELEPHONE ENCOUNTER
Rx Refill Note  Requested Prescriptions     Pending Prescriptions Disp Refills    meloxicam (MOBIC) 15 MG tablet [Pharmacy Med Name: MELOXICAM 15 MG TABLET] 30 tablet 0     Sig: TAKE ONE TABLET BY MOUTH DAILY      Last office visit with prescribing clinician: 2/28/2022      Next office visit with prescribing clinician: Visit date not found   Last Filled:05.11.2022      DX Tendinitis of right rotator cuff +2 more  Date of Surgery:05.11.2022      Aliyah Johnson MA  06/16/22, 08:14 EDT    {TIP  Encounters:    {TIP  Please add Last Relevant Lab Date if appropriate:  {TIP  Is Refill Pharmacy correct?:

## 2022-07-19 RX ORDER — MELOXICAM 15 MG/1
TABLET ORAL
Qty: 30 TABLET | Refills: 0 | Status: SHIPPED | OUTPATIENT
Start: 2022-07-19 | End: 2022-08-17

## 2022-07-19 NOTE — TELEPHONE ENCOUNTER
Rx Refill Note  Requested Prescriptions     Pending Prescriptions Disp Refills    meloxicam (MOBIC) 15 MG tablet [Pharmacy Med Name: MELOXICAM 15 MG TABLET] 30 tablet 0     Sig: TAKE ONE TABLET BY MOUTH DAILY      Last office visit with prescribing clinician: 2/28/2022      Next office visit with prescribing clinician: Visit date not found   Last Filled:06.16.2022      DX:  1. Tendinitis of right rotator cuff    2. Subacromial impingement of right shoulder    3. Incomplete tear of right rotator cuff, unspecified whether traumatic          Diane Ann MA  07/19/22, 08:44 EDT    {TIP  Encounters:    {TIP  Please add Last Relevant Lab Date if appropriate:  {TIP  Is Refill Pharmacy correct?:

## 2022-08-17 RX ORDER — MELOXICAM 15 MG/1
TABLET ORAL
Qty: 30 TABLET | Refills: 0 | Status: SHIPPED | OUTPATIENT
Start: 2022-08-17 | End: 2022-09-14

## 2022-08-17 NOTE — TELEPHONE ENCOUNTER
Rx Refill Note  Requested Prescriptions     Pending Prescriptions Disp Refills    meloxicam (MOBIC) 15 MG tablet [Pharmacy Med Name: MELOXICAM 15 MG TABLET] 30 tablet 0     Sig: TAKE ONE TABLET BY MOUTH DAILY      Last office visit with prescribing clinician: 2/28/2022      Next office visit with prescribing clinician: Visit date not found   Last Filled:07.19.2022    DX:right shoulder pain, follow-up MRI results      Diane Ann MA  08/17/22, 08:45 EDT    {TIP  Encounters:    {TIP  Please add Last Relevant Lab Date if appropriate:  {TIP  Is Refill Pharmacy correct?:

## 2022-08-26 DIAGNOSIS — I10 ESSENTIAL HYPERTENSION: ICD-10-CM

## 2022-08-26 RX ORDER — CHLORTHALIDONE 25 MG/1
TABLET ORAL
Qty: 30 TABLET | Refills: 2 | Status: SHIPPED | OUTPATIENT
Start: 2022-08-26 | End: 2023-01-12

## 2022-09-12 ENCOUNTER — OFFICE VISIT (OUTPATIENT)
Dept: FAMILY MEDICINE CLINIC | Facility: CLINIC | Age: 57
End: 2022-09-12

## 2022-09-12 VITALS
BODY MASS INDEX: 27.08 KG/M2 | SYSTOLIC BLOOD PRESSURE: 148 MMHG | WEIGHT: 211 LBS | RESPIRATION RATE: 15 BRPM | DIASTOLIC BLOOD PRESSURE: 84 MMHG | TEMPERATURE: 98.4 F | HEART RATE: 82 BPM | HEIGHT: 74 IN | OXYGEN SATURATION: 99 %

## 2022-09-12 DIAGNOSIS — R93.7 ABNORMAL X-RAY OF LUMBAR SPINE: Primary | ICD-10-CM

## 2022-09-12 LAB
BILIRUB BLD-MCNC: NEGATIVE MG/DL
CLARITY, POC: CLEAR
COLOR UR: YELLOW
GLUCOSE UR STRIP-MCNC: NEGATIVE MG/DL
KETONES UR QL: NEGATIVE
LEUKOCYTE EST, POC: NEGATIVE
NITRITE UR-MCNC: NEGATIVE MG/ML
PH UR: 7 [PH] (ref 5–8)
PROT UR STRIP-MCNC: NEGATIVE MG/DL
RBC # UR STRIP: NEGATIVE /UL
SP GR UR: 1.01 (ref 1–1.03)
UROBILINOGEN UR QL: NORMAL

## 2022-09-12 PROCEDURE — 99213 OFFICE O/P EST LOW 20 MIN: CPT | Performed by: PHYSICIAN ASSISTANT

## 2022-09-12 PROCEDURE — 81002 URINALYSIS NONAUTO W/O SCOPE: CPT | Performed by: PHYSICIAN ASSISTANT

## 2022-09-12 NOTE — PROGRESS NOTES
"Chief Complaint  possible kidney stone    Subjective          Pj Steele presents to [facility]  History of Present Illness  Pj is a 57 year old male with abnormal lumbar spine x-ray.  Pj states he has been seeing chiropractor for chronic back pain.  He was told that he may have a renal stone or something else in his abdomen that was shown on imaging.  Markel denied any current abdominal pain, fevers, chills, nausea, vomiting, diarrhea, hematuria, urinary symptoms or difficulty urinating.  Appetite and sleep have been normal.  Review of Systems   Respiratory: Negative.    Cardiovascular: Negative.    Genitourinary: Negative.  Negative for dysuria, frequency, hematuria and urgency.   Musculoskeletal: Positive for back pain.   All other systems reviewed and are negative.    Objective   Vital Signs:   /84 (BP Location: Right arm, Patient Position: Sitting, Cuff Size: Adult)   Pulse 82   Temp 98.4 °F (36.9 °C)   Resp 15   Ht 188 cm (74\")   Wt 95.7 kg (211 lb)   SpO2 99%   BMI 27.09 kg/m²     Physical Exam  Vitals and nursing note reviewed.   Constitutional:       Appearance: Normal appearance. He is well-developed, well-groomed and overweight.      Interventions: Face mask in place.   HENT:      Head: Normocephalic and atraumatic.   Neck:      Trachea: Trachea and phonation normal. No tracheal tenderness.   Cardiovascular:      Rate and Rhythm: Normal rate and regular rhythm.      Pulses: Normal pulses.      Heart sounds: Normal heart sounds, S1 normal and S2 normal. No murmur heard.  Pulmonary:      Effort: Pulmonary effort is normal.      Breath sounds: Normal breath sounds and air entry.   Abdominal:      General: Bowel sounds are normal.      Palpations: Abdomen is soft. There is no hepatomegaly.      Tenderness: There is no abdominal tenderness. There is no right CVA tenderness, left CVA tenderness, guarding or rebound. Negative signs include Iraheta's sign, " Rovsing's sign, McBurney's sign, psoas sign and obturator sign.   Musculoskeletal:      Cervical back: Neck supple.      Right lower leg: No edema.      Left lower leg: No edema.   Skin:     General: Skin is warm and dry.      Capillary Refill: Capillary refill takes less than 2 seconds.   Neurological:      Mental Status: He is alert and oriented to person, place, and time.   Psychiatric:         Attention and Perception: Attention and perception normal.         Mood and Affect: Mood and affect normal.         Speech: Speech normal.         Behavior: Behavior normal. Behavior is cooperative.         Thought Content: Thought content normal.         Cognition and Memory: Cognition and memory normal.         Judgment: Judgment normal.     I wore a facial mask during this patient encounter.  Patient also wearing a surgical mask.  Hand hygiene performed before and after seeing the patient.     Result Review :        Results for orders placed or performed in visit on 09/12/22   POC Urinalysis Dipstick    Specimen: Urine   Result Value Ref Range    Color Yellow Yellow, Straw, Dark Yellow, Noelle    Clarity, UA Clear Clear    Glucose, UA Negative Negative mg/dL    Bilirubin Negative Negative    Ketones, UA Negative Negative    Specific Gravity  1.010 1.005 - 1.030    Blood, UA Negative Negative    pH, Urine 7.0 5.0 - 8.0    Protein, POC Negative Negative mg/dL    Urobilinogen, UA Normal Normal, 0.2 E.U./dL    Leukocytes Negative Negative    Nitrite, UA Negative Negative       Data reviewed: Radiologic studies Lumbar spine imaging findings safeguard radiology interpretation service.  Ordered by Dallin Mathew D.C on 8/15/22.  .           Assessment and Plan    Diagnoses and all orders for this visit:    1. Abnormal x-ray of lumbar spine (Primary)  -     CT Abdomen Pelvis With & Without Contrast; Future  -     POC Urinalysis Dipstick    Pj was seen at office today for abnormal x-ray of lumbar spine ordered by chiropractorDr.  Priyanka.  I have reviewed his report with him at office visit today.  There was a probable left-sided-year-old lithiasis noted.  Differential consideration may be given to calcified abdominal lymph node.  Abnormality measured 2 cm x 2.2 cm and left mid abdomen area.  Marked spondylosis noted at L 4.  Also had degenerative arthritis at L 4, L 2 and L 3.  We will proceed with CT of abdomen and pelvis with and without contrast for further evaluation of possible stone versus lymph node.  In office urinalysis was normal.  He will be notified of test results when completed.      Follow Up   Return if symptoms worsen or fail to improve.  Patient was given instructions and counseling regarding his condition or for health maintenance advice. Please see specific information pulled into the AVS if appropriate.     [facility]  [address] [place] [id number]  Dept: [phone]  Dept Fax: [phone]  Loc: [phone]  Loc Fax: [phone]        Answers for HPI/ROS submitted by the patient on 9/6/2022  What is the primary reason for your visit?: Other  Please describe your symptoms.: I have a spot that showed up on a X-ray from my Chiropractor  Have you had these symptoms before?: No  How long have you been having these symptoms?: 1-2 weeks

## 2022-09-14 RX ORDER — MELOXICAM 15 MG/1
TABLET ORAL
Qty: 30 TABLET | Refills: 0 | Status: SHIPPED | OUTPATIENT
Start: 2022-09-14 | End: 2022-10-18 | Stop reason: SDUPTHER

## 2022-09-23 ENCOUNTER — HOSPITAL ENCOUNTER (OUTPATIENT)
Dept: CT IMAGING | Facility: HOSPITAL | Age: 57
Discharge: HOME | End: 2022-09-23
Admitting: PHYSICIAN ASSISTANT

## 2022-09-23 DIAGNOSIS — R93.7 ABNORMAL X-RAY OF LUMBAR SPINE: ICD-10-CM

## 2022-09-23 PROCEDURE — 0 IOPAMIDOL PER 1 ML: Performed by: PHYSICIAN ASSISTANT

## 2022-09-23 PROCEDURE — 74178 CT ABD&PLV WO CNTR FLWD CNTR: CPT

## 2022-09-23 RX ADMIN — IOPAMIDOL 100 ML: 755 INJECTION, SOLUTION INTRAVENOUS at 14:45

## 2022-10-05 ENCOUNTER — HOSPITAL ENCOUNTER (EMERGENCY)
Facility: HOSPITAL | Age: 57
Discharge: HOME | End: 2022-10-05
Attending: EMERGENCY MEDICINE | Admitting: EMERGENCY MEDICINE

## 2022-10-05 ENCOUNTER — APPOINTMENT (OUTPATIENT)
Dept: GENERAL RADIOLOGY | Facility: HOSPITAL | Age: 57
End: 2022-10-05

## 2022-10-05 ENCOUNTER — OFFICE VISIT (OUTPATIENT)
Dept: ORTHOPEDIC SURGERY | Facility: CLINIC | Age: 57
End: 2022-10-05

## 2022-10-05 VITALS — WEIGHT: 202 LBS | HEIGHT: 74 IN | BODY MASS INDEX: 25.93 KG/M2

## 2022-10-05 VITALS
OXYGEN SATURATION: 99 % | SYSTOLIC BLOOD PRESSURE: 154 MMHG | RESPIRATION RATE: 16 BRPM | DIASTOLIC BLOOD PRESSURE: 102 MMHG | HEIGHT: 74 IN | TEMPERATURE: 98.1 F | BODY MASS INDEX: 26.03 KG/M2 | WEIGHT: 202.8 LBS | HEART RATE: 68 BPM

## 2022-10-05 DIAGNOSIS — M75.41 SUBACROMIAL IMPINGEMENT OF RIGHT SHOULDER: ICD-10-CM

## 2022-10-05 DIAGNOSIS — M25.511 ACUTE PAIN OF RIGHT SHOULDER: Primary | ICD-10-CM

## 2022-10-05 DIAGNOSIS — M75.111 INCOMPLETE TEAR OF RIGHT ROTATOR CUFF, UNSPECIFIED WHETHER TRAUMATIC: ICD-10-CM

## 2022-10-05 DIAGNOSIS — M75.81 TENDINITIS OF RIGHT ROTATOR CUFF: Primary | ICD-10-CM

## 2022-10-05 PROCEDURE — 99214 OFFICE O/P EST MOD 30 MIN: CPT | Performed by: ORTHOPAEDIC SURGERY

## 2022-10-05 PROCEDURE — 99283 EMERGENCY DEPT VISIT LOW MDM: CPT

## 2022-10-05 PROCEDURE — 25010000002 KETOROLAC TROMETHAMINE PER 15 MG: Performed by: EMERGENCY MEDICINE

## 2022-10-05 PROCEDURE — 96372 THER/PROPH/DIAG INJ SC/IM: CPT

## 2022-10-05 PROCEDURE — 73030 X-RAY EXAM OF SHOULDER: CPT

## 2022-10-05 RX ORDER — KETOROLAC TROMETHAMINE 30 MG/ML
60 INJECTION, SOLUTION INTRAMUSCULAR; INTRAVENOUS ONCE
Status: COMPLETED | OUTPATIENT
Start: 2022-10-05 | End: 2022-10-05

## 2022-10-05 RX ORDER — PREDNISONE 10 MG/1
TABLET ORAL
Qty: 39 TABLET | Refills: 0 | Status: SHIPPED | OUTPATIENT
Start: 2022-10-05 | End: 2022-10-26

## 2022-10-05 RX ADMIN — KETOROLAC TROMETHAMINE 60 MG: 30 INJECTION, SOLUTION INTRAMUSCULAR; INTRAVENOUS at 08:04

## 2022-10-05 NOTE — ED TRIAGE NOTES
"Pt via PV from home with c/o R shoulder injury. Pt reports hx of R shoulder \"tear\" that he re-injured yesterday while working on in the yard.     All triage performed with this RN wearing appropriate PPE.  Pt placed in mask upon arrival to ED.    "

## 2022-10-05 NOTE — PROGRESS NOTES
"Subjective:     Patient ID: Pj Steele is a 57 y.o. male.    Chief Complaint:  Right shoulder pain, new exacerbation      History of Present Illness  Pj Steele presents to clinic today for evaluation of right shoulder pain. He had a sudden episode of acute exacerbation of pain to his right shoulder that happened yesterday while he was trying to shoo away a bug from a door frame when he heard a \"pop\". He has been having some increasing levels of pain particularly with work over the last 4 to 6 weeks. This has been fairly well treated with meloxicam, but since this episode last night he had intense pain, rates as an 9 out of 10, worse with any type of activity or motion, rates as a 2 to 3 out of 10 at rest. He describes it as aching in nature with sharp pain with attempted lifting, pushing, and pulling activities. It is not associated numbness or tingling. It is localized to the anterolateral aspect of the right shoulder. The patient denies being diabetic. The adult female states that the patient is using ice and BioFreeze to mitigate his pain with little improvement.    The patient is accompanied by an adult female who contributes to the history of patient.        Social History     Occupational History   • Not on file   Tobacco Use   • Smoking status: Never   • Smokeless tobacco: Never   Vaping Use   • Vaping Use: Never used   Substance and Sexual Activity   • Alcohol use: Yes     Alcohol/week: 4.0 standard drinks     Types: 4 Cans of beer per week   • Drug use: Never   • Sexual activity: Yes     Partners: Female     Birth control/protection: Surgical, Vasectomy      Past Medical History:   Diagnosis Date   • Depression     Mild   • Hypertension    • Hypotestosteronemia    • Neck strain 1993   • Periarthritis of shoulder 1993   • Polyp of colon, adenomatous    • Polyp of colon, hyperplastic    • Rotator cuff syndrome 2021    MRI?     Past Surgical History:   Procedure Laterality Date   • CHOLECYSTECTOMY     • " "COLONOSCOPY N/A 04/24/2018    Procedure: COLONOSCOPY, polypectomy;  Surgeon: Josie Campos MD;  Location: [facility] OR;  Service: Gastroenterology   • COLONOSCOPY N/A 05/06/2021    Procedure: COLONOSCOPY;  Surgeon: Anais Beltre MD;  Location: [facility] OR;  Service: Gastroenterology;  Laterality: N/A;  diverticulosis   • WRIST SURGERY         Family History   Problem Relation Age of Onset   • Leukemia Father    • Dementia Father    • Brain cancer Brother    • Colon cancer Mother    • Broken bones Mother         Wrist   • Diabetes Mother    • Osteoporosis Mother          Review of Systems        Objective:  Vitals:    10/05/22 0933   Weight: 91.6 kg (202 lb)   Height: 188 cm (74\")         10/05/22  0933   Weight: 91.6 kg (202 lb)     Body mass index is 25.94 kg/m².  Physical Exam    Vital signs reviewed.   General: No acute distress, alert and oriented  Eyes: conjunctiva clear; pupils equally round and reactive  ENT: external ears and nose atraumatic; oropharynx clear  CV: no peripheral edema  Resp: normal respiratory effort  Skin: no rashes or wounds; normal turgor  Psych: mood and affect appropriate; recent and remote memory intact          Ortho Exam       Right shoulder-  FF- Active- 50 degrees  Passive- 95 degrees  Strength- 3/5  ER- Active- 20 degrees  Passive 40 degrees  Strength- 3/5  Belly press test strength- 4+/5  Bear hug sign- Negative  Empty can test- Positive  Drop arm test- Mildly positive  Ext rotation lag sign- Mildly positive  Cross arm test- Negative  Tenderness over AC joint- Negative  Horner's- Positive  Brisk cap refill to all digits, palpable 2+ radial pulse  Positive deltoid firing in all three components.    Imaging:    XR Shoulder 2+ View Right    Result Date: 10/5/2022  1. No acute osseous abnormality. 2. Mild degenerative arthropathy at the AC joint.  This report was finalized on 10/5/2022 9:33 AM by Dr. Evens Pinedo MD.      Reviewed x-rays from today's visit the emergency " department of right shoulder including review of imaging indicates no evidence of fracture, dislocation, subluxation, no significant humeral head elevation, moderate degenerative change of the AC joint noted.      Assessment:        1. Tendinitis of right rotator cuff    2. Subacromial impingement of right shoulder    3. Incomplete tear of right rotator cuff, unspecified whether traumatic           Plan:        1.Discussed treatment options at length with patient at today's visit. At this point in time, I recommended proceeding with an urgent MRI given the fact that he had a significant acute change in his physical exam. He does have known prior partial thickness tear of his right rotator cuff from back in 2022. However, given his current symptomatology, acute onset and failure of improvement with activity modification and rest as well as anti-inflammatory medications and home exercises, we would like to proceed with imaging at this time.     2. I have also given patient prednisone taper to try to help with his initial pain at this point in time.    3. Follow up over the phone in regards to the results of the MRI and discuss further treatment options at that time.        Pj Steele was in agreement with plan and had all questions answered.     Orders:  Orders Placed This Encounter   Procedures   • MRI Shoulder Right Without Contrast       Medications:  New Medications Ordered This Visit   Medications   • predniSONE (DELTASONE) 10 MG tablet     Si mg po daily x 3 days, then 40 mg po daily x 3 days, then 20 mg po daily x 3 days, then 10 mg po daily x 3 days     Dispense:  39 tablet     Refill:  0       Followup:  No follow-ups on file.    Diagnoses and all orders for this visit:    1. Tendinitis of right rotator cuff (Primary)  -     MRI Shoulder Right Without Contrast; Future    2. Subacromial impingement of right shoulder  -     MRI Shoulder Right Without Contrast; Future    3. Incomplete tear of right  rotator cuff, unspecified whether traumatic  -     MRI Shoulder Right Without Contrast; Future    Other orders  -     predniSONE (DELTASONE) 10 MG tablet; 60 mg po daily x 3 days, then 40 mg po daily x 3 days, then 20 mg po daily x 3 days, then 10 mg po daily x 3 days  Dispense: 39 tablet; Refill: 0        Transcribed from ambient dictation for Parish Yin MD by Emily Neely.  10/05/22   12:23 EDT    Patient or patient representative verbalized consent to the visit recording.  I have personally performed the services described in this document as transcribed by the above individual, and it is both accurate and complete.  Patient verbalized consent to the visit recording.

## 2022-10-05 NOTE — DISCHARGE INSTRUCTIONS
We have arranged an appointment with Dr. Yin at 10: 00 a.m. this morning here at [facility].  Recommend arriving 15 minutes early.  Dr. Yin will direct care from this point forward.  Tylenol or ibuprofen as needed for pain.  Return to ED for medical emergencies.

## 2022-10-05 NOTE — ED PROVIDER NOTES
Subjective   History of Present Illness  Pj Steele is a 57-year-old white male who present secondary to right shoulder injury and pain.  Patient has history of chronic right shoulder pain.  He is under the care of Dr. Parish Yin.  Patient states he had a known 50% tear of an unnamed tendon in his right shoulder.  Yesterday patient was walking inside his home.  He had his right hand on the doorknob when a bee flew in front of him.  As Mr. Claire jumped back to avoid the be he felt a sharp pain in his shoulder with associated loud pop.  Patient's pain worsened immediately.  Ice and rest did not improve patient's symptoms.  Thus he elected to come to the ED today for evaluation.    Patient is right-hand dominant.    History provided by:  Patient      Review of Systems   Constitutional: Negative for fever.   HENT: Negative for rhinorrhea.    Eyes: Negative for redness.   Respiratory: Negative for cough.    Cardiovascular: Negative for chest pain.   Gastrointestinal: Negative for abdominal pain.   Genitourinary: Negative for dysuria.   Musculoskeletal: Positive for arthralgias (Chronic right shoulder pain). Negative for back pain.   Skin: Negative for rash.   Neurological: Negative for syncope.   All other systems reviewed and are negative.      Past Medical History:   Diagnosis Date   • Depression     Mild   • Hypertension    • Hypotestosteronemia    • Neck strain 1993   • Periarthritis of shoulder 1993   • Polyp of colon, adenomatous    • Polyp of colon, hyperplastic    • Rotator cuff syndrome 2021    MRI?       Allergies   Allergen Reactions   • Statins Myalgia   • Amoxicillin Rash       Past Surgical History:   Procedure Laterality Date   • CHOLECYSTECTOMY     • COLONOSCOPY N/A 04/24/2018    Procedure: COLONOSCOPY, polypectomy;  Surgeon: Josie Campos MD;  Location:  [facility] OR;  Service: Gastroenterology   • COLONOSCOPY N/A 05/06/2021    Procedure: COLONOSCOPY;  Surgeon: Anais Beltre MD;  Location:   [facility] OR;  Service: Gastroenterology;  Laterality: N/A;  diverticulosis   • WRIST SURGERY         Family History   Problem Relation Age of Onset   • Leukemia Father    • Dementia Father    • Brain cancer Brother    • Colon cancer Mother    • Broken bones Mother         Wrist   • Diabetes Mother    • Osteoporosis Mother        Social History     Socioeconomic History   • Marital status:    Tobacco Use   • Smoking status: Never Smoker   • Smokeless tobacco: Never Used   Vaping Use   • Vaping Use: Never used   Substance and Sexual Activity   • Alcohol use: Yes     Alcohol/week: 4.0 standard drinks     Types: 4 Cans of beer per week   • Drug use: Never   • Sexual activity: Yes     Partners: Female     Birth control/protection: Surgical, Vasectomy           Objective   Physical Exam  Vitals and nursing note reviewed.   Constitutional:       Comments: 57-year-old white male lying in bed.  Patient appears in good overall health.  Patient holding right upper extremity in abduction in neutral rotation.  Vital signs notable for blood pressure 157/111.  Otherwise unremarkable.  Patient friendly and cooperative.         Procedures           ED Course  ED Course as of 10/05/22 1122   Wed Oct 05, 2022   0749 Obtaining x-rays of right shoulder.  Will review prior shoulder imaging [SS]   0802 Patient drove himself to the ED.  This obviously limits what medications he can be given here in the ED.  Giving Toradol 60 mg IM. [SS]   0842 X-rays are unremarkable.  I am concerned that patient has worsened his infraspinatus tendon tear.  Calling Dr. Yin's office to see if they have not openings today. [SS]   0850 We have arranged an appointment with Dr. Yin for 10 AM today.  Awaiting radiology reading of x-rays. [SS]      ED Course User Index  [SS] Noe Anna MD      CT Abdomen Pelvis With & Without Contrast    Result Date: 9/24/2022  Narrative: CT ABDOMEN AND PELVIS, MULTIPHASE RENAL, 9/23/2022 HISTORY: 57-year-old male  with left-sided renal or urinary calculus questioned on outside lumbar spine x-ray. TECHNIQUE: CT imaging of the abdomen and pelvis, multiphase renal protocol. Radiation dose reduction techniques included automated exposure control. Radiation audit for CT and nuclear cardiology exams in the last 12 months: 0. RENAL/URINARY FINDINGS: Noncontrast images show no radiopaque stone material within the kidneys, ureters or urinary bladder. Following contrast, both kidneys enhance normally. There is a small benign parenchymal cyst in the right mid kidney measuring about 1.0 cm. No additional renal lesion. No collecting system or ureter abnormality. No evidence of upper urinary tract obstruction. No visible urothelial lesion. Bladder is normal. ABDOMEN FINDINGS: Benign calcified central mesenteric lymph node in the midline. Cholecystectomy. No bile duct dilatation. Liver, pancreas and spleen are normal in size and appearance. Normal adrenal glands. Normal caliber abdominal aorta. Small bowel and colon are normal in caliber and appearance. The appendix is not seen. PELVIS FINDINGS: Urinary bladder, prostate and rectum appear normal. No free pelvic fluid. Lung base images show no active disease. Spine images show severe degenerative disc disease at L4-5 with posterior disc protrusion and lower lumbar degenerative facet arthropathy.     Impression: 1.  Negative CT imaging of the kidneys, ureters and bladder. No nephrolithiasis or evidence of urinary obstruction. 2.  Cholecystectomy. Signer Name: Evens Pinedo MD  Signed: 9/24/2022 3:25 PM  Workstation Name: [id number]  [facility]    XR Shoulder 2+ View Right    Result Date: 10/5/2022  Narrative: RIGHT SHOULDER, 10/05/2022     HISTORY: 57-year-old male in the ED after shoulder injury yesterday. Deltoid region pain.  TECHNIQUE: Two-view right shoulder series.  COMPARISON: *  Right shoulder, 01/10/2022.  FINDINGS: No fracture, dislocation or other  acute osseous abnormality is demonstrated. Mild degenerative changes at the AC joint.      Impression: 1. No acute osseous abnormality. 2. Mild degenerative arthropathy at the AC joint.  This report was finalized on 10/5/2022 9:33 AM by Dr. Evens Pinedo MD.           My differential includes but is not limited to shoulder contusion, clavicle fracture, scapular fracture, humeral head, neck or shaft fracture, AC separation, shoulder dislocation, shoulder sprain, axillary nerve palsy and myocardial infarction                                  MDM    Final diagnoses:   Acute pain of right shoulder       ED Disposition  ED Disposition     ED Disposition   Discharge    Condition   Stable    Comment   --             Parish Yin MD  [address]  [address]  [place] [address]  [phone]    Go to   Office at 9:45 this morning for 10 AM appointment.         Medication List      No changes were made to your prescriptions during this visit.          Noe Anna MD  10/05/22 1127

## 2022-10-06 ENCOUNTER — TELEPHONE (OUTPATIENT)
Dept: ORTHOPEDIC SURGERY | Facility: CLINIC | Age: 57
End: 2022-10-06

## 2022-10-06 ENCOUNTER — OFFICE VISIT (OUTPATIENT)
Dept: FAMILY MEDICINE CLINIC | Facility: CLINIC | Age: 57
End: 2022-10-06

## 2022-10-06 VITALS
DIASTOLIC BLOOD PRESSURE: 98 MMHG | RESPIRATION RATE: 14 BRPM | HEIGHT: 74 IN | BODY MASS INDEX: 27.34 KG/M2 | OXYGEN SATURATION: 99 % | SYSTOLIC BLOOD PRESSURE: 164 MMHG | HEART RATE: 82 BPM | TEMPERATURE: 98 F | WEIGHT: 213 LBS

## 2022-10-06 DIAGNOSIS — M54.31 SCIATICA OF RIGHT SIDE: Primary | ICD-10-CM

## 2022-10-06 PROCEDURE — 99213 OFFICE O/P EST LOW 20 MIN: CPT | Performed by: PHYSICIAN ASSISTANT

## 2022-10-06 RX ORDER — METHOCARBAMOL 750 MG/1
750 TABLET, FILM COATED ORAL 3 TIMES DAILY
Qty: 30 TABLET | Refills: 0 | Status: SHIPPED | OUTPATIENT
Start: 2022-10-06 | End: 2022-12-14

## 2022-10-06 NOTE — TELEPHONE ENCOUNTER
Provider: DR LUIS Kidder: THERESE OWEN    Relationship to Patient: SELF    Phone Number: [phone]    Reason for Call: PT NEEDS NOTE FOR WORK TO HAVE HIM ON LIGHT DUTY RESTRICTIONS DUE TO RIGHT SHOULDER. HE WOULD LIKE IT SENT TO HIM VIA [employer].

## 2022-10-06 NOTE — PROGRESS NOTES
"Chief Complaint  Sciatica (Right side, pain has been going on for 2.5 mo. Seeing chiropractor and PT through chiropractor but not improving. )    Subjective          Pj Steele presents to [facility]  History of Present Illness  Pj is a 57-year-old male who presents with right sided sciatica pain for the past 2-1/2 months.  Has been seen in physical therapist and chiropractor at  [facility] in [facility].  States they have been adjusting his hips and back area without relief.  He has been having right-sided back pain that radiates to his right thigh, lower leg and foot area.  He will have tingling in his right foot that travels up to his right lower leg and then right thigh.  States the right side is worse.  When he gets the pain and tingling sensation there the pain is worse.  Pain increases when he is lying flat or standing for prolonged periods of time.  Laying on his side and walking helps.  Has not tried ice but has used heat off and on.  Sports creams does not help.  Currently on prednisone due to his right shoulder injury yesterday.  States his back pain and leg pain has improved today.  Denied any recent injury or trauma to his back or right leg.  Bowel movements have been normal without dark black tarry stools.  Denied any fevers, chills, abdominal pain, nausea, vomiting, diarrhea or incontinence.  Sleep has been restless due to shoulder and back pain.  Appetite has been normal.  Review of Systems   Constitutional: Negative.    HENT: Negative.    Respiratory: Negative.    Cardiovascular: Negative.    Genitourinary: Negative.    Musculoskeletal: Positive for back pain.   Psychiatric/Behavioral: Positive for sleep disturbance.   All other systems reviewed and are negative.    Objective   Vital Signs:   /98   Pulse 82   Temp 98 °F (36.7 °C)   Resp 14   Ht 188 cm (74\")   Wt 96.6 kg (213 lb)   SpO2 99%   BMI 27.35 kg/m²     Physical Exam  Vitals " and nursing note reviewed.   Constitutional:       Appearance: Normal appearance. He is well-developed, well-groomed and overweight.      Interventions: Face mask in place.   HENT:      Head: Normocephalic and atraumatic.   Neck:      Trachea: Trachea and phonation normal. No tracheal tenderness.   Cardiovascular:      Rate and Rhythm: Normal rate and regular rhythm.      Pulses: Normal pulses.      Heart sounds: Normal heart sounds, S1 normal and S2 normal. No murmur heard.  Pulmonary:      Effort: Pulmonary effort is normal.      Breath sounds: Normal breath sounds and air entry.   Abdominal:      General: Bowel sounds are normal.      Palpations: Abdomen is soft. There is no hepatomegaly.      Tenderness: There is no abdominal tenderness. There is no right CVA tenderness, left CVA tenderness, guarding or rebound. Negative signs include Iraheta's sign, Rovsing's sign, McBurney's sign, psoas sign and obturator sign.   Musculoskeletal:      Cervical back: Neck supple.      Lumbar back: Spasms and tenderness present. No bony tenderness. Decreased range of motion. Negative right straight leg raise test and negative left straight leg raise test. No scoliosis.        Back:       Right lower leg: No edema.      Left lower leg: No edema.      Comments: Low back: Tender to palpation over right buttock area.  Spasm is noted.  Decreased range of motion in all fields.  2+ distal pulses, 2+ DTR's, negative straight leg raise, 5/5 bilateral muscle strength and good gait/heel-toe walk noted.   Skin:     General: Skin is warm and dry.      Capillary Refill: Capillary refill takes less than 2 seconds.   Neurological:      Mental Status: He is alert and oriented to person, place, and time.      Motor: Motor function is intact.      Gait: Gait is intact.      Deep Tendon Reflexes:      Reflex Scores:       Patellar reflexes are 2+ on the right side and 2+ on the left side.  Psychiatric:         Attention and Perception: Attention and  perception normal.         Mood and Affect: Mood and affect normal.         Speech: Speech normal.         Behavior: Behavior normal. Behavior is cooperative.         Thought Content: Thought content normal.         Cognition and Memory: Cognition and memory normal.         Judgment: Judgment normal.     I wore a facial mask during this patient encounter.  Patient also wearing a surgical mask.  Hand hygiene performed before and after seeing the patient.     Result Review :                 Assessment and Plan    Diagnoses and all orders for this visit:    1. Sciatica of right side (Primary)  -     Ambulatory Referral to Physical Therapy Evaluate and treat  -     methocarbamol (ROBAXIN) 750 MG tablet; Take 1 tablet by mouth 3 (Three) Times a Day.  Dispense: 30 tablet; Refill: 0    Pj was seen in office today for chronic sciatica of right side.  Will refer to physical therapy at [place].  Have sent a Robaxin muscle relaxer to pharmacy to take with his meloxicam and prednisone.  Stressed the importance of doing stretches.  Will consider MRI in future if warranted.  He will continue to apply moist heat to the area.      Follow Up   Return if symptoms worsen or fail to improve.  Patient was given instructions and counseling regarding his condition or for health maintenance advice. Please see specific information pulled into the AVS if appropriate.     [facility]  [address] [address]  Dept: [phone]  Dept Fax: [phone]  Loc: [phone]  Loc Fax: [phone]

## 2022-10-07 ENCOUNTER — HOSPITAL ENCOUNTER (OUTPATIENT)
Dept: MRI IMAGING | Facility: HOSPITAL | Age: 57
Discharge: HOME | End: 2022-10-07
Admitting: ORTHOPAEDIC SURGERY

## 2022-10-07 DIAGNOSIS — M75.81 TENDINITIS OF RIGHT ROTATOR CUFF: ICD-10-CM

## 2022-10-07 DIAGNOSIS — M75.41 SUBACROMIAL IMPINGEMENT OF RIGHT SHOULDER: ICD-10-CM

## 2022-10-07 DIAGNOSIS — M75.111 INCOMPLETE TEAR OF RIGHT ROTATOR CUFF, UNSPECIFIED WHETHER TRAUMATIC: ICD-10-CM

## 2022-10-07 PROCEDURE — 73221 MRI JOINT UPR EXTREM W/O DYE: CPT

## 2022-10-10 ENCOUNTER — TELEPHONE (OUTPATIENT)
Dept: ORTHOPEDIC SURGERY | Facility: CLINIC | Age: 57
End: 2022-10-10

## 2022-10-10 NOTE — TELEPHONE ENCOUNTER
Caller: Pj Steele    Relationship: Self    Best call back number: [phone]    What test was performed: MRI RIGHT SHOULDER    When was the test performed: 10/7/2022    Where was the test performed: [facility]    Additional notes: PT WOULD LIKE TO DISCUSS RESULTS. HE ALSO REQUESTED AN APPT. HE HAS AN APPT SCHEDULED FOR 11/7/2022 (FIRST AVAILABLE).

## 2022-10-17 ENCOUNTER — TELEPHONE (OUTPATIENT)
Dept: ORTHOPEDIC SURGERY | Facility: CLINIC | Age: 57
End: 2022-10-17

## 2022-10-17 ENCOUNTER — PREP FOR SURGERY (OUTPATIENT)
Dept: OTHER | Facility: HOSPITAL | Age: 57
End: 2022-10-17

## 2022-10-17 DIAGNOSIS — M75.121 COMPLETE TEAR OF RIGHT ROTATOR CUFF, UNSPECIFIED WHETHER TRAUMATIC: Primary | ICD-10-CM

## 2022-10-17 RX ORDER — PREGABALIN 75 MG/1
150 CAPSULE ORAL ONCE
Status: CANCELLED | OUTPATIENT
Start: 2022-10-17 | End: 2022-10-17

## 2022-10-17 RX ORDER — ACETAMINOPHEN 500 MG
1000 TABLET ORAL ONCE
Status: CANCELLED | OUTPATIENT
Start: 2022-10-17 | End: 2022-10-17

## 2022-10-17 RX ORDER — MELOXICAM 7.5 MG/1
15 TABLET ORAL ONCE
Status: CANCELLED | OUTPATIENT
Start: 2022-10-17 | End: 2022-10-17

## 2022-10-18 ENCOUNTER — PREP FOR SURGERY (OUTPATIENT)
Dept: OTHER | Facility: HOSPITAL | Age: 57
End: 2022-10-18

## 2022-10-18 RX ORDER — MELOXICAM 15 MG/1
15 TABLET ORAL DAILY
Qty: 30 TABLET | Refills: 0 | Status: SHIPPED | OUTPATIENT
Start: 2022-10-18 | End: 2022-11-17

## 2022-10-18 NOTE — TELEPHONE ENCOUNTER
Rx Refill Note  Requested Prescriptions     Pending Prescriptions Disp Refills    meloxicam (MOBIC) 15 MG tablet 30 tablet 0     Sig: Take 1 tablet by mouth Daily.      Last office visit with prescribing clinician: 10/5/2022      Next office visit with prescribing clinician: Visit date not found   Last Filled:09.14.2022  DX:right shoulder given full-thickness rotator cuff tear     Diane Ann MA  10/18/22, 13:53 EDT    {TIP  Encounters:    {TIP  Please add Last Relevant Lab Date if appropriate:  {TIP  Is Refill Pharmacy correct?:

## 2022-10-20 RX ORDER — MELOXICAM 15 MG/1
TABLET ORAL
Qty: 30 TABLET | Refills: 0 | OUTPATIENT
Start: 2022-10-20

## 2022-10-24 ENCOUNTER — HOSPITAL ENCOUNTER (OUTPATIENT)
Dept: MRI IMAGING | Facility: HOSPITAL | Age: 57
End: 2022-10-24

## 2022-10-26 ENCOUNTER — OFFICE VISIT (OUTPATIENT)
Dept: FAMILY MEDICINE CLINIC | Facility: CLINIC | Age: 57
End: 2022-10-26

## 2022-10-26 VITALS
RESPIRATION RATE: 14 BRPM | BODY MASS INDEX: 26.18 KG/M2 | TEMPERATURE: 98.5 F | HEIGHT: 74 IN | OXYGEN SATURATION: 99 % | DIASTOLIC BLOOD PRESSURE: 80 MMHG | SYSTOLIC BLOOD PRESSURE: 128 MMHG | HEART RATE: 73 BPM | WEIGHT: 204 LBS

## 2022-10-26 DIAGNOSIS — M75.121 COMPLETE TEAR OF RIGHT ROTATOR CUFF, UNSPECIFIED WHETHER TRAUMATIC: ICD-10-CM

## 2022-10-26 DIAGNOSIS — Z01.818 PREOPERATIVE CLEARANCE: Primary | ICD-10-CM

## 2022-10-26 PROCEDURE — 99213 OFFICE O/P EST LOW 20 MIN: CPT | Performed by: PHYSICIAN ASSISTANT

## 2022-10-26 NOTE — PROGRESS NOTES
"Chief Complaint  surgery clearance (Right shoulder)    Subjective          Pj Steele presents to [facility]  History of Present Illness  Pj is a 57 year old male who presents for preoperative clearance for right shoulder rotator cuff repair on November 4,2022 at [facility].  He is having presurgical testing on Friday,October 28,2022.  States his shoulder has worsened.  Looking forward to having surgery.  Denied any fevers, chills, upper respiratory symptoms, chest pain, shortness of air, cough, wheezing, abdominal pain, GI upset, or swelling of ankles.  Had updated COVID booster and flu shot earlier this month.  Review of Systems   Constitutional: Negative.    HENT: Negative.    Eyes: Negative.    Respiratory: Negative.    Cardiovascular: Negative.    Gastrointestinal: Negative.    Endocrine: Negative.    Genitourinary: Negative.    Musculoskeletal: Negative.    Skin: Negative.    Allergic/Immunologic: Negative.    Neurological: Negative.    Hematological: Negative.    Psychiatric/Behavioral: Negative.    All other systems reviewed and are negative.    Objective   Vital Signs:   /80   Pulse 73   Temp 98.5 °F (36.9 °C)   Resp 14   Ht 188 cm (74\")   Wt 92.5 kg (204 lb)   SpO2 99%   BMI 26.19 kg/m²     Physical Exam  Vitals and nursing note reviewed.   Constitutional:       Appearance: Normal appearance. He is well-developed, well-groomed and overweight.      Interventions: Face mask in place.   HENT:      Head: Normocephalic and atraumatic.      Jaw: There is normal jaw occlusion.      Right Ear: Hearing, tympanic membrane, ear canal and external ear normal.      Left Ear: Hearing, tympanic membrane, ear canal and external ear normal.      Nose: Nose normal.      Right Sinus: No maxillary sinus tenderness or frontal sinus tenderness.      Left Sinus: No maxillary sinus tenderness or frontal sinus tenderness.      Mouth/Throat:      Lips: Pink.      Mouth: Mucous " membranes are moist.      Dentition: Normal dentition.      Tongue: No lesions.      Pharynx: Oropharynx is clear. Uvula midline.      Tonsils: No tonsillar exudate.   Eyes:      General: Lids are normal.      Conjunctiva/sclera: Conjunctivae normal.      Pupils: Pupils are equal, round, and reactive to light.   Neck:      Thyroid: No thyroid mass, thyromegaly or thyroid tenderness.      Vascular: No carotid bruit.      Trachea: Trachea and phonation normal. No tracheal tenderness.   Cardiovascular:      Rate and Rhythm: Normal rate and regular rhythm.      Pulses: Normal pulses.      Heart sounds: Normal heart sounds, S1 normal and S2 normal. No murmur heard.  Pulmonary:      Effort: Pulmonary effort is normal.      Breath sounds: Normal breath sounds and air entry.   Abdominal:      General: Bowel sounds are normal.      Palpations: Abdomen is soft.      Tenderness: There is no abdominal tenderness. There is no right CVA tenderness, left CVA tenderness, guarding or rebound. Negative signs include Iraheta's sign, Rovsing's sign, McBurney's sign, psoas sign and obturator sign.   Musculoskeletal:      Cervical back: Neck supple.      Right lower leg: No edema.      Left lower leg: No edema.   Lymphadenopathy:      Cervical: No cervical adenopathy.      Right cervical: No superficial, deep or posterior cervical adenopathy.     Left cervical: No superficial, deep or posterior cervical adenopathy.   Skin:     General: Skin is warm and dry.      Capillary Refill: Capillary refill takes less than 2 seconds.   Neurological:      Mental Status: He is alert and oriented to person, place, and time.      Deep Tendon Reflexes:      Reflex Scores:       Patellar reflexes are 2+ on the right side and 2+ on the left side.  Psychiatric:         Attention and Perception: Attention and perception normal.         Mood and Affect: Mood and affect normal.         Speech: Speech normal.         Behavior: Behavior is cooperative.          Thought Content: Thought content normal.         Cognition and Memory: Cognition and memory normal.         Judgment: Judgment normal.     I wore a facial mask during this patient encounter.  Patient also wearing a surgical mask.  Hand hygiene performed before and after seeing the patient.     Result Review :                 Assessment and Plan    Diagnoses and all orders for this visit:    1. Preoperative clearance (Primary)    2. Complete tear of right rotator cuff, unspecified whether traumatic    Pj was seen in office today for presurgical clearance for upcoming right rotator cuff repair.  He will be having blood work and EKG at [facility] later this week.  He is medically cleared for upcoming surgery pending any abnormalities on EKG and lab work.  I will send a cardiac clearance letter to his orthopedist, Dr. Yin.    Follow Up   Return if symptoms worsen or fail to improve.  Patient was given instructions and counseling regarding his condition or for health maintenance advice. Please see specific information pulled into the AVS if appropriate.     RAMON Thibodeaux [facility]  [address] [place] [id number]  Dept: [phone]  Dept Fax: [phone]  Loc: [phone]  Loc Fax: [phone]        Answers for HPI/ROS submitted by the patient on 10/19/2022  What is the primary reason for your visit?: Physical

## 2022-10-28 ENCOUNTER — TELEPHONE (OUTPATIENT)
Dept: ORTHOPEDIC SURGERY | Facility: CLINIC | Age: 57
End: 2022-10-28

## 2022-10-28 ENCOUNTER — PRE-ADMISSION TESTING (OUTPATIENT)
Dept: PREADMISSION TESTING | Facility: HOSPITAL | Age: 57
End: 2022-10-28

## 2022-10-28 VITALS
DIASTOLIC BLOOD PRESSURE: 90 MMHG | SYSTOLIC BLOOD PRESSURE: 140 MMHG | HEART RATE: 78 BPM | OXYGEN SATURATION: 98 % | RESPIRATION RATE: 16 BRPM

## 2022-10-28 DIAGNOSIS — M75.121 COMPLETE TEAR OF RIGHT ROTATOR CUFF, UNSPECIFIED WHETHER TRAUMATIC: ICD-10-CM

## 2022-10-28 LAB
ALBUMIN SERPL-MCNC: 4.5 G/DL (ref 3.5–5.2)
ALBUMIN/GLOB SERPL: 2.3 G/DL
ALP SERPL-CCNC: 49 U/L (ref 39–117)
ALT SERPL W P-5'-P-CCNC: 26 U/L (ref 1–41)
ANION GAP SERPL CALCULATED.3IONS-SCNC: 10.1 MMOL/L (ref 5–15)
APTT PPP: 31 SECONDS (ref 24.3–38.1)
AST SERPL-CCNC: 18 U/L (ref 1–40)
BASOPHILS # BLD AUTO: 0.05 10*3/MM3 (ref 0–0.2)
BASOPHILS NFR BLD AUTO: 0.7 % (ref 0–1.5)
BILIRUB SERPL-MCNC: 0.8 MG/DL (ref 0–1.2)
BUN SERPL-MCNC: 17 MG/DL (ref 6–20)
BUN/CREAT SERPL: 17 (ref 7–25)
CALCIUM SPEC-SCNC: 8.9 MG/DL (ref 8.6–10.5)
CHLORIDE SERPL-SCNC: 100 MMOL/L (ref 98–107)
CHOLEST SERPL-MCNC: 222 MG/DL (ref 0–200)
CO2 SERPL-SCNC: 25.9 MMOL/L (ref 22–29)
CREAT SERPL-MCNC: 1 MG/DL (ref 0.76–1.27)
DEPRECATED RDW RBC AUTO: 42.9 FL (ref 37–54)
EGFRCR SERPLBLD CKD-EPI 2021: 87.8 ML/MIN/1.73
EOSINOPHIL # BLD AUTO: 0.31 10*3/MM3 (ref 0–0.4)
EOSINOPHIL NFR BLD AUTO: 4.3 % (ref 0.3–6.2)
ERYTHROCYTE [DISTWIDTH] IN BLOOD BY AUTOMATED COUNT: 13.8 % (ref 12.3–15.4)
GLOBULIN UR ELPH-MCNC: 2 GM/DL
GLUCOSE SERPL-MCNC: 88 MG/DL (ref 65–99)
HCT VFR BLD AUTO: 43 % (ref 37.5–51)
HDLC SERPL-MCNC: 43 MG/DL (ref 40–60)
HGB BLD-MCNC: 14.7 G/DL (ref 13–17.7)
IMM GRANULOCYTES # BLD AUTO: 0.04 10*3/MM3 (ref 0–0.05)
IMM GRANULOCYTES NFR BLD AUTO: 0.6 % (ref 0–0.5)
INR PPP: 0.95 (ref 0.9–1.1)
LDLC SERPL CALC-MCNC: 146 MG/DL (ref 0–100)
LDLC/HDLC SERPL: 3.31 {RATIO}
LYMPHOCYTES # BLD AUTO: 1.16 10*3/MM3 (ref 0.7–3.1)
LYMPHOCYTES NFR BLD AUTO: 16 % (ref 19.6–45.3)
MCH RBC QN AUTO: 29.1 PG (ref 26.6–33)
MCHC RBC AUTO-ENTMCNC: 34.2 G/DL (ref 31.5–35.7)
MCV RBC AUTO: 85.1 FL (ref 79–97)
MONOCYTES # BLD AUTO: 0.61 10*3/MM3 (ref 0.1–0.9)
MONOCYTES NFR BLD AUTO: 8.4 % (ref 5–12)
NEUTROPHILS NFR BLD AUTO: 5.06 10*3/MM3 (ref 1.7–7)
NEUTROPHILS NFR BLD AUTO: 70 % (ref 42.7–76)
NRBC BLD AUTO-RTO: 0 /100 WBC (ref 0–0.2)
PLATELET # BLD AUTO: 260 10*3/MM3 (ref 140–450)
PMV BLD AUTO: 8.9 FL (ref 6–12)
POTASSIUM SERPL-SCNC: 4 MMOL/L (ref 3.5–5.2)
PROT SERPL-MCNC: 6.5 G/DL (ref 6–8.5)
PROTHROMBIN TIME: 12.8 SECONDS (ref 12.1–15)
QT INTERVAL: 419 MS
RBC # BLD AUTO: 5.05 10*6/MM3 (ref 4.14–5.8)
SODIUM SERPL-SCNC: 136 MMOL/L (ref 136–145)
TRIGL SERPL-MCNC: 183 MG/DL (ref 0–150)
VLDLC SERPL-MCNC: 33 MG/DL (ref 5–40)
WBC NRBC COR # BLD: 7.23 10*3/MM3 (ref 3.4–10.8)

## 2022-10-28 PROCEDURE — 36415 COLL VENOUS BLD VENIPUNCTURE: CPT

## 2022-10-28 PROCEDURE — 93005 ELECTROCARDIOGRAM TRACING: CPT

## 2022-10-28 PROCEDURE — 93010 ELECTROCARDIOGRAM REPORT: CPT | Performed by: INTERNAL MEDICINE

## 2022-10-28 PROCEDURE — 85730 THROMBOPLASTIN TIME PARTIAL: CPT | Performed by: ORTHOPAEDIC SURGERY

## 2022-10-28 PROCEDURE — 85610 PROTHROMBIN TIME: CPT | Performed by: ORTHOPAEDIC SURGERY

## 2022-10-28 PROCEDURE — 80061 LIPID PANEL: CPT | Performed by: PHYSICIAN ASSISTANT

## 2022-10-28 PROCEDURE — 80053 COMPREHEN METABOLIC PANEL: CPT | Performed by: PHYSICIAN ASSISTANT

## 2022-10-28 PROCEDURE — 85025 COMPLETE CBC W/AUTO DIFF WBC: CPT | Performed by: PHYSICIAN ASSISTANT

## 2022-10-28 NOTE — TELEPHONE ENCOUNTER
Caller: THERESE OWEN    Relationship: SELF    Best call back number: [phone]    What form or medical record are you requesting: FMLA PAPERWORK FOR RT SHOULDER    Who is requesting this form or medical record from you: [employer]    Additional notes: PT WOULD LIKE A CALL BACK TO DISCUSS IF [employer]/FMLA PAPERWORK WAS RECEIVED.  PT STATED THAT [employer] FACED PAPERWORK WITHIN THE LAST TWO WEEKS TO BE FILLED OUT AND SENT BACK IN.

## 2022-11-03 ENCOUNTER — ANESTHESIA EVENT (OUTPATIENT)
Dept: PERIOP | Facility: HOSPITAL | Age: 57
End: 2022-11-03

## 2022-11-04 ENCOUNTER — HOSPITAL ENCOUNTER (OUTPATIENT)
Facility: HOSPITAL | Age: 57
Setting detail: HOSPITAL OUTPATIENT SURGERY
Discharge: HOME | End: 2022-11-04
Attending: ORTHOPAEDIC SURGERY | Admitting: ORTHOPAEDIC SURGERY

## 2022-11-04 ENCOUNTER — ANESTHESIA (OUTPATIENT)
Dept: PERIOP | Facility: HOSPITAL | Age: 57
End: 2022-11-04

## 2022-11-04 VITALS
HEART RATE: 68 BPM | DIASTOLIC BLOOD PRESSURE: 70 MMHG | SYSTOLIC BLOOD PRESSURE: 108 MMHG | TEMPERATURE: 97.7 F | WEIGHT: 198.4 LBS | OXYGEN SATURATION: 98 % | BODY MASS INDEX: 25.47 KG/M2 | RESPIRATION RATE: 16 BRPM

## 2022-11-04 DIAGNOSIS — M75.121 COMPLETE TEAR OF RIGHT ROTATOR CUFF, UNSPECIFIED WHETHER TRAUMATIC: ICD-10-CM

## 2022-11-04 PROCEDURE — 25010000002 ONDANSETRON PER 1 MG: Performed by: NURSE ANESTHETIST, CERTIFIED REGISTERED

## 2022-11-04 PROCEDURE — 25010000002 VANCOMYCIN 1.5 G RECONSTITUTED SOLUTION 1 EACH VIAL: Performed by: ORTHOPAEDIC SURGERY

## 2022-11-04 PROCEDURE — 25010000002 MIDAZOLAM PER 1MG: Performed by: NURSE ANESTHETIST, CERTIFIED REGISTERED

## 2022-11-04 PROCEDURE — C1713 ANCHOR/SCREW BN/BN,TIS/BN: HCPCS | Performed by: ORTHOPAEDIC SURGERY

## 2022-11-04 PROCEDURE — 25010000002 EPINEPHRINE PER 0.1 MG: Performed by: ORTHOPAEDIC SURGERY

## 2022-11-04 PROCEDURE — 25010000002 DEXAMETHASONE PER 1 MG: Performed by: NURSE ANESTHETIST, CERTIFIED REGISTERED

## 2022-11-04 PROCEDURE — 29827 SHO ARTHRS SRG RT8TR CUF RPR: CPT | Performed by: SPECIALIST/TECHNOLOGIST, OTHER

## 2022-11-04 PROCEDURE — 29827 SHO ARTHRS SRG RT8TR CUF RPR: CPT | Performed by: ORTHOPAEDIC SURGERY

## 2022-11-04 PROCEDURE — C1763 CONN TISS, NON-HUMAN: HCPCS | Performed by: ORTHOPAEDIC SURGERY

## 2022-11-04 PROCEDURE — 25010000002 PROPOFOL 200 MG/20ML EMULSION: Performed by: NURSE ANESTHETIST, CERTIFIED REGISTERED

## 2022-11-04 PROCEDURE — 76942 ECHO GUIDE FOR BIOPSY: CPT | Performed by: ORTHOPAEDIC SURGERY

## 2022-11-04 PROCEDURE — 25010000002 FENTANYL CITRATE (PF) 100 MCG/2ML SOLUTION: Performed by: NURSE ANESTHETIST, CERTIFIED REGISTERED

## 2022-11-04 PROCEDURE — 25010000002 PHENYLEPHRINE 10 MG/ML SOLUTION: Performed by: NURSE ANESTHETIST, CERTIFIED REGISTERED

## 2022-11-04 PROCEDURE — 23430 REPAIR BICEPS TENDON: CPT | Performed by: ORTHOPAEDIC SURGERY

## 2022-11-04 PROCEDURE — 25010000002 SUCCINYLCHOLINE PER 20 MG: Performed by: NURSE ANESTHETIST, CERTIFIED REGISTERED

## 2022-11-04 PROCEDURE — 23430 REPAIR BICEPS TENDON: CPT | Performed by: SPECIALIST/TECHNOLOGIST, OTHER

## 2022-11-04 PROCEDURE — 25010000002 NEOSTIGMINE 10 MG/10ML SOLUTION: Performed by: ANESTHESIOLOGY

## 2022-11-04 DEVICE — 2.8MM Q-FIX ALL SUTURE ANCHOR
Type: IMPLANTABLE DEVICE | Site: SHOULDER | Status: FUNCTIONAL
Brand: Q-FIX

## 2022-11-04 DEVICE — BONE ANCHORS 3 WITH ARTHROSCOPIC DELIVERY SYSTEM ADVANCED
Type: IMPLANTABLE DEVICE | Site: SHOULDER | Status: FUNCTIONAL
Brand: BONE ANCHORS WITH ARTHROSCOPIC DELIVERY SYSTEM - ADVANCED

## 2022-11-04 DEVICE — IMPLANTABLE DEVICE
Type: IMPLANTABLE DEVICE | Site: SHOULDER | Status: FUNCTIONAL
Brand: BIOINDUCTIVE IMPLANT WITH ARTHROSCOPIC DELIVERY SYSTEM - MEDIUM

## 2022-11-04 DEVICE — ANCHR TNDN REGENETEN TISS/SFT EA/8: Type: IMPLANTABLE DEVICE | Site: SHOULDER | Status: FUNCTIONAL

## 2022-11-04 RX ORDER — ACETAMINOPHEN 500 MG
1000 TABLET ORAL ONCE
Status: COMPLETED | OUTPATIENT
Start: 2022-11-04 | End: 2022-11-04

## 2022-11-04 RX ORDER — NEOSTIGMINE METHYLSULFATE 1 MG/ML
INJECTION, SOLUTION INTRAVENOUS AS NEEDED
Status: DISCONTINUED | OUTPATIENT
Start: 2022-11-04 | End: 2022-11-04 | Stop reason: SURG

## 2022-11-04 RX ORDER — OXYCODONE HYDROCHLORIDE AND ACETAMINOPHEN 5; 325 MG/1; MG/1
1 TABLET ORAL EVERY 4 HOURS PRN
Qty: 42 TABLET | Refills: 0 | Status: SHIPPED | OUTPATIENT
Start: 2022-11-04 | End: 2022-12-14

## 2022-11-04 RX ORDER — SODIUM CHLORIDE, SODIUM LACTATE, POTASSIUM CHLORIDE, CALCIUM CHLORIDE 600; 310; 30; 20 MG/100ML; MG/100ML; MG/100ML; MG/100ML
100 INJECTION, SOLUTION INTRAVENOUS CONTINUOUS
Status: DISCONTINUED | OUTPATIENT
Start: 2022-11-04 | End: 2022-11-04 | Stop reason: HOSPADM

## 2022-11-04 RX ORDER — ONDANSETRON 2 MG/ML
4 INJECTION INTRAMUSCULAR; INTRAVENOUS ONCE AS NEEDED
Status: COMPLETED | OUTPATIENT
Start: 2022-11-04 | End: 2022-11-04

## 2022-11-04 RX ORDER — ROCURONIUM BROMIDE 10 MG/ML
INJECTION, SOLUTION INTRAVENOUS AS NEEDED
Status: DISCONTINUED | OUTPATIENT
Start: 2022-11-04 | End: 2022-11-04 | Stop reason: SURG

## 2022-11-04 RX ORDER — BUPIVACAINE HYDROCHLORIDE 5 MG/ML
INJECTION, SOLUTION EPIDURAL; INTRACAUDAL
Status: COMPLETED | OUTPATIENT
Start: 2022-11-04 | End: 2022-11-04

## 2022-11-04 RX ORDER — ONDANSETRON 2 MG/ML
4 INJECTION INTRAMUSCULAR; INTRAVENOUS ONCE
Status: COMPLETED | OUTPATIENT
Start: 2022-11-04 | End: 2022-11-04

## 2022-11-04 RX ORDER — SODIUM CHLORIDE 0.9 % (FLUSH) 0.9 %
10 SYRINGE (ML) INJECTION EVERY 12 HOURS SCHEDULED
Status: DISCONTINUED | OUTPATIENT
Start: 2022-11-04 | End: 2022-11-04 | Stop reason: HOSPADM

## 2022-11-04 RX ORDER — DEXAMETHASONE SODIUM PHOSPHATE 4 MG/ML
4 INJECTION, SOLUTION INTRA-ARTICULAR; INTRALESIONAL; INTRAMUSCULAR; INTRAVENOUS; SOFT TISSUE ONCE
Status: COMPLETED | OUTPATIENT
Start: 2022-11-04 | End: 2022-11-04

## 2022-11-04 RX ORDER — PHENYLEPHRINE HYDROCHLORIDE 10 MG/ML
INJECTION INTRAVENOUS AS NEEDED
Status: DISCONTINUED | OUTPATIENT
Start: 2022-11-04 | End: 2022-11-04 | Stop reason: SURG

## 2022-11-04 RX ORDER — LIDOCAINE HYDROCHLORIDE AND EPINEPHRINE 10; 10 MG/ML; UG/ML
INJECTION, SOLUTION INFILTRATION; PERINEURAL AS NEEDED
Status: DISCONTINUED | OUTPATIENT
Start: 2022-11-04 | End: 2022-11-04 | Stop reason: HOSPADM

## 2022-11-04 RX ORDER — LIDOCAINE HYDROCHLORIDE 20 MG/ML
INJECTION, SOLUTION EPIDURAL; INFILTRATION; INTRACAUDAL; PERINEURAL AS NEEDED
Status: DISCONTINUED | OUTPATIENT
Start: 2022-11-04 | End: 2022-11-04 | Stop reason: SURG

## 2022-11-04 RX ORDER — FAMOTIDINE 10 MG/ML
20 INJECTION, SOLUTION INTRAVENOUS
Status: COMPLETED | OUTPATIENT
Start: 2022-11-04 | End: 2022-11-04

## 2022-11-04 RX ORDER — MIDAZOLAM HYDROCHLORIDE 2 MG/2ML
1 INJECTION, SOLUTION INTRAMUSCULAR; INTRAVENOUS
Status: DISCONTINUED | OUTPATIENT
Start: 2022-11-04 | End: 2022-11-04 | Stop reason: HOSPADM

## 2022-11-04 RX ORDER — DIPHENHYDRAMINE HYDROCHLORIDE 50 MG/ML
12.5 INJECTION INTRAMUSCULAR; INTRAVENOUS
Status: DISCONTINUED | OUTPATIENT
Start: 2022-11-04 | End: 2022-11-04 | Stop reason: HOSPADM

## 2022-11-04 RX ORDER — FENTANYL CITRATE 50 UG/ML
25 INJECTION, SOLUTION INTRAMUSCULAR; INTRAVENOUS
Status: DISCONTINUED | OUTPATIENT
Start: 2022-11-04 | End: 2022-11-04 | Stop reason: HOSPADM

## 2022-11-04 RX ORDER — ASPIRIN 81 MG/1
81 TABLET ORAL DAILY
Qty: 14 TABLET | Refills: 0 | Status: SHIPPED | OUTPATIENT
Start: 2022-11-04

## 2022-11-04 RX ORDER — FAMOTIDINE 20 MG/1
20 TABLET, FILM COATED ORAL
Status: COMPLETED | OUTPATIENT
Start: 2022-11-04 | End: 2022-11-04

## 2022-11-04 RX ORDER — MELOXICAM 7.5 MG/1
15 TABLET ORAL ONCE
Status: COMPLETED | OUTPATIENT
Start: 2022-11-04 | End: 2022-11-04

## 2022-11-04 RX ORDER — SODIUM CHLORIDE 0.9 % (FLUSH) 0.9 %
10 SYRINGE (ML) INJECTION AS NEEDED
Status: DISCONTINUED | OUTPATIENT
Start: 2022-11-04 | End: 2022-11-04 | Stop reason: HOSPADM

## 2022-11-04 RX ORDER — PROPOFOL 10 MG/ML
INJECTION, EMULSION INTRAVENOUS AS NEEDED
Status: DISCONTINUED | OUTPATIENT
Start: 2022-11-04 | End: 2022-11-04 | Stop reason: SURG

## 2022-11-04 RX ORDER — ONDANSETRON 4 MG/1
4 TABLET, FILM COATED ORAL EVERY 8 HOURS PRN
Qty: 30 TABLET | Refills: 0 | Status: SHIPPED | OUTPATIENT
Start: 2022-11-04 | End: 2022-11-11

## 2022-11-04 RX ORDER — SODIUM CHLORIDE 9 MG/ML
INJECTION, SOLUTION INTRAVENOUS AS NEEDED
Status: DISCONTINUED | OUTPATIENT
Start: 2022-11-04 | End: 2022-11-04 | Stop reason: HOSPADM

## 2022-11-04 RX ORDER — GLYCOPYRROLATE 0.2 MG/ML
INJECTION INTRAMUSCULAR; INTRAVENOUS AS NEEDED
Status: DISCONTINUED | OUTPATIENT
Start: 2022-11-04 | End: 2022-11-04 | Stop reason: SURG

## 2022-11-04 RX ORDER — DEXMEDETOMIDINE HYDROCHLORIDE 100 UG/ML
INJECTION, SOLUTION INTRAVENOUS AS NEEDED
Status: DISCONTINUED | OUTPATIENT
Start: 2022-11-04 | End: 2022-11-04 | Stop reason: SURG

## 2022-11-04 RX ORDER — SODIUM CHLORIDE, SODIUM LACTATE, POTASSIUM CHLORIDE, CALCIUM CHLORIDE 600; 310; 30; 20 MG/100ML; MG/100ML; MG/100ML; MG/100ML
9 INJECTION, SOLUTION INTRAVENOUS CONTINUOUS
Status: DISCONTINUED | OUTPATIENT
Start: 2022-11-04 | End: 2022-11-04 | Stop reason: HOSPADM

## 2022-11-04 RX ORDER — LIDOCAINE HYDROCHLORIDE 10 MG/ML
0.5 INJECTION, SOLUTION EPIDURAL; INFILTRATION; INTRACAUDAL; PERINEURAL ONCE AS NEEDED
Status: DISCONTINUED | OUTPATIENT
Start: 2022-11-04 | End: 2022-11-04 | Stop reason: HOSPADM

## 2022-11-04 RX ORDER — FENTANYL CITRATE 50 UG/ML
INJECTION, SOLUTION INTRAMUSCULAR; INTRAVENOUS AS NEEDED
Status: DISCONTINUED | OUTPATIENT
Start: 2022-11-04 | End: 2022-11-04 | Stop reason: SURG

## 2022-11-04 RX ORDER — DEXAMETHASONE SODIUM PHOSPHATE 4 MG/ML
INJECTION, SOLUTION INTRA-ARTICULAR; INTRALESIONAL; INTRAMUSCULAR; INTRAVENOUS; SOFT TISSUE AS NEEDED
Status: DISCONTINUED | OUTPATIENT
Start: 2022-11-04 | End: 2022-11-04 | Stop reason: SURG

## 2022-11-04 RX ORDER — SODIUM CHLORIDE 9 MG/ML
40 INJECTION, SOLUTION INTRAVENOUS AS NEEDED
Status: DISCONTINUED | OUTPATIENT
Start: 2022-11-04 | End: 2022-11-04 | Stop reason: HOSPADM

## 2022-11-04 RX ORDER — SUCCINYLCHOLINE CHLORIDE 20 MG/ML
INJECTION INTRAMUSCULAR; INTRAVENOUS AS NEEDED
Status: DISCONTINUED | OUTPATIENT
Start: 2022-11-04 | End: 2022-11-04 | Stop reason: SURG

## 2022-11-04 RX ORDER — OXYCODONE HYDROCHLORIDE AND ACETAMINOPHEN 5; 325 MG/1; MG/1
1 TABLET ORAL ONCE AS NEEDED
Status: DISCONTINUED | OUTPATIENT
Start: 2022-11-04 | End: 2022-11-04 | Stop reason: HOSPADM

## 2022-11-04 RX ORDER — SENNA PLUS 8.6 MG/1
1 TABLET ORAL NIGHTLY
Qty: 20 TABLET | Refills: 0 | Status: SHIPPED | OUTPATIENT
Start: 2022-11-04 | End: 2023-01-11

## 2022-11-04 RX ORDER — PREGABALIN 75 MG/1
150 CAPSULE ORAL ONCE
Status: COMPLETED | OUTPATIENT
Start: 2022-11-04 | End: 2022-11-04

## 2022-11-04 RX ADMIN — PHENYLEPHRINE HYDROCHLORIDE 100 MCG: 10 INJECTION INTRAVENOUS at 14:15

## 2022-11-04 RX ADMIN — BUPIVACAINE HYDROCHLORIDE 20 ML: 5 INJECTION, SOLUTION EPIDURAL; INTRACAUDAL; PERINEURAL at 12:43

## 2022-11-04 RX ADMIN — PHENYLEPHRINE HYDROCHLORIDE 100 MCG: 10 INJECTION INTRAVENOUS at 14:50

## 2022-11-04 RX ADMIN — PREGABALIN 150 MG: 75 CAPSULE ORAL at 10:43

## 2022-11-04 RX ADMIN — FENTANYL CITRATE 50 MCG: 50 INJECTION, SOLUTION INTRAMUSCULAR; INTRAVENOUS at 14:02

## 2022-11-04 RX ADMIN — PHENYLEPHRINE HYDROCHLORIDE 100 MCG: 10 INJECTION INTRAVENOUS at 14:25

## 2022-11-04 RX ADMIN — PHENYLEPHRINE HYDROCHLORIDE 100 MCG: 10 INJECTION INTRAVENOUS at 15:11

## 2022-11-04 RX ADMIN — ACETAMINOPHEN 1000 MG: 500 TABLET, FILM COATED ORAL at 10:43

## 2022-11-04 RX ADMIN — DEXMEDETOMIDINE 20 MCG: 100 INJECTION, SOLUTION, CONCENTRATE INTRAVENOUS at 12:43

## 2022-11-04 RX ADMIN — MIDAZOLAM HYDROCHLORIDE 1 MG: 1 INJECTION, SOLUTION INTRAMUSCULAR; INTRAVENOUS at 12:44

## 2022-11-04 RX ADMIN — MIDAZOLAM HYDROCHLORIDE 1 MG: 1 INJECTION, SOLUTION INTRAMUSCULAR; INTRAVENOUS at 11:35

## 2022-11-04 RX ADMIN — ROCURONIUM BROMIDE 5 MG: 10 INJECTION INTRAVENOUS at 14:02

## 2022-11-04 RX ADMIN — ONDANSETRON 4 MG: 2 INJECTION INTRAMUSCULAR; INTRAVENOUS at 10:34

## 2022-11-04 RX ADMIN — NEOSTIGMINE METHYLSULFATE 3 MG: 0.5 INJECTION INTRAVENOUS at 15:38

## 2022-11-04 RX ADMIN — PHENYLEPHRINE HYDROCHLORIDE 100 MCG: 10 INJECTION INTRAVENOUS at 15:30

## 2022-11-04 RX ADMIN — MELOXICAM 15 MG: 7.5 TABLET ORAL at 10:43

## 2022-11-04 RX ADMIN — PROPOFOL 160 MG: 10 INJECTION, EMULSION INTRAVENOUS at 14:02

## 2022-11-04 RX ADMIN — GLYCOPYRROLATE 0.4 MG: 0.2 INJECTION INTRAMUSCULAR; INTRAVENOUS at 15:38

## 2022-11-04 RX ADMIN — GLYCOPYRROLATE 0.2 MG: 0.2 INJECTION INTRAMUSCULAR; INTRAVENOUS at 14:50

## 2022-11-04 RX ADMIN — ONDANSETRON 4 MG: 2 INJECTION INTRAMUSCULAR; INTRAVENOUS at 16:39

## 2022-11-04 RX ADMIN — DEXAMETHASONE SODIUM PHOSPHATE 4 MG: 4 INJECTION, SOLUTION INTRAMUSCULAR; INTRAVENOUS at 10:36

## 2022-11-04 RX ADMIN — FAMOTIDINE 20 MG: 10 INJECTION INTRAVENOUS at 10:34

## 2022-11-04 RX ADMIN — LIDOCAINE HYDROCHLORIDE 100 MG: 20 INJECTION, SOLUTION EPIDURAL; INFILTRATION; INTRACAUDAL; PERINEURAL at 14:02

## 2022-11-04 RX ADMIN — ROCURONIUM BROMIDE 35 MG: 10 INJECTION INTRAVENOUS at 14:10

## 2022-11-04 RX ADMIN — PHENYLEPHRINE HYDROCHLORIDE 100 MCG: 10 INJECTION INTRAVENOUS at 14:45

## 2022-11-04 RX ADMIN — PHENYLEPHRINE HYDROCHLORIDE 100 MCG: 10 INJECTION INTRAVENOUS at 15:41

## 2022-11-04 RX ADMIN — VANCOMYCIN HYDROCHLORIDE 1500 MG: 1.5 INJECTION, POWDER, LYOPHILIZED, FOR SOLUTION INTRAVENOUS at 12:26

## 2022-11-04 RX ADMIN — ROCURONIUM BROMIDE 10 MG: 10 INJECTION INTRAVENOUS at 14:50

## 2022-11-04 RX ADMIN — SUCCINYLCHOLINE CHLORIDE 140 MG: 20 INJECTION, SOLUTION INTRAMUSCULAR; INTRAVENOUS at 14:02

## 2022-11-04 RX ADMIN — DEXAMETHASONE SODIUM PHOSPHATE 4 MG: 4 INJECTION, SOLUTION INTRAMUSCULAR; INTRAVENOUS at 12:43

## 2022-11-04 RX ADMIN — SODIUM CHLORIDE, POTASSIUM CHLORIDE, SODIUM LACTATE AND CALCIUM CHLORIDE 9 ML/HR: 600; 310; 30; 20 INJECTION, SOLUTION INTRAVENOUS at 10:23

## 2022-11-04 NOTE — ANESTHESIA PROCEDURE NOTES
Peripheral Block    Pre-sedation assessment completed: 11/4/2022 12:37 PM    Patient reassessed immediately prior to procedure    Patient location during procedure: pre-op  Start time: 11/4/2022 12:40 PM  Stop time: 11/4/2022 12:43 PM  Reason for block: at surgeon's request and post-op pain management  Performed by  CRNA/CAA: Larry Cross, CRNA  Preanesthetic Checklist  Completed: patient identified, IV checked, site marked, risks and benefits discussed, surgical consent, monitors and equipment checked, pre-op evaluation and timeout performed  Prep:  Pt Position: supine  Sterile barriers:cap, gloves, mask and washed/disinfected hands  Prep: ChloraPrep  Patient monitoring: blood pressure monitoring, continuous pulse oximetry and EKG  Procedure    Sedation: yes  Performed under: local infiltration  Guidance:ultrasound guided    ULTRASOUND INTERPRETATION.  Using ultrasound guidance a 21 G gauge needle was placed in close proximity to the nerve, at which point, under ultrasound guidance anesthetic was injected in the area of the nerve and spread of the anesthesia was seen on ultrasound in close proximity thereto.  There were no abnormalities seen on ultrasound; a digital image was taken; and the patient tolerated the procedure with no complications. Images:still images obtained, printed/placed on chart    Laterality:right  Block Type:interscalene  Injection Technique:single-shot  Needle Type:echogenic  Needle Gauge:21 G  Resistance on Injection: none    Medications Used: bupivacaine PF (MARCAINE) injection 0.5% - Injection   20 mL - 11/4/2022 12:43:00 PM      Post Assessment  Injection Assessment: negative aspiration for heme, no paresthesia on injection and incremental injection  Patient Tolerance:comfortable throughout block  Complications:no

## 2022-11-04 NOTE — OP NOTE
Date of Operation: 11/4/2022     PREOPERATIVE DIAGNOSIS:  1. Right shoulder rotator cuff tear  2. Right shoulder biceps tendonitis  3. Right shoulder subacromial bursitis    POSTOPERATIVE DIAGNOSIS:    1. Right shoulder partial thickness articular sided rotator cuff tear  2. Right shoulder biceps tendonitis  3. Right shoulder subacromial bursitis    PROCEDURE PERFORMED:   1. Right shoulder arthroscopic rotator cuff repair with bio-inductive implant  2. Right shoulder biceps tenodesis     SURGEON: Parish Yin MD     ASSISTANT: Assistant: Moises Oro CSA was responsible for performing the following activities: Retraction, Irrigation, Placing Dressing and Held/Positioned Camera and their skilled assistance was necessary for the success of this case.       ANESTHESIA: General endotracheal anesthesia with regional block.       ESTIMATED BLOOD LOSS:  minimal     URINE OUTPUT: Not recorded.       FLUIDS: Per anesthesia.       COMPLICATIONS: None.       SPECIMENS: None.       DRAINS: None.      IMPLANTS:  [employer] bio inductive implant-medium, 2.8 mm Q fix anchor x1 for biceps tenodesis     ARTHROSCOPIC FINDINGS:   1.  Glenoid-no significant articular cartilage pathology  2.  Humeral head-no significant articular cartilage pathology  3.  Labrum-mild degenerative fraying of superior labrum and biceps anchor site  4.  Biceps tendon-significant biceps tenosynovitis particular in the groove portion  5.  Rotator cuff-high-grade partial-thickness articular sided rotator cuff tear involving primarily the supraspinatus  6.  Axillary pouch-free loose bodies  7.  Subacromial space-moderately thickened subacromial bursa, no evidence of bursal sided tear or significant degenerative change to AC joint     INDICATIONS FOR PROCEDURE: Patient is a pleasant 57 y.o. who has had significant limitation and use of right shoulder as well as associated pain with failure of conservative treatments. I discussed treatment  options available to the patient and patient wished to proceed with surgical treatment. I explained details of the procedure, as well as the risks, benefits, and alternatives as documented on history and physical, and the patient had all questions answered prior to signing the operative consent form. No guarantees were given in regard to results of the surgery.       DESCRIPTION OF PROCEDURE: The patient was seen, evaluated, and cleared for surgery by anesthesia. Admitted in the preoperative holding area. The operative site was marked, consent was reviewed, history and physical was updated, and preoperative labs were reviewed. A regional block was then placed per anesthesia. The patient was then taken to the operating room and placed in a supine position on a beachchair table. After successful intubation per anesthesia, facemask was placed securing head at this point time with the neck in normal anatomic position.  Patient was then elevated up into a seated position with neck maintained in normal anatomic alignment. All bony prominences were well-padded and patient was secured to the table with a waist strap. The right  upper extremity was then sterilely prepped and draped in a standard fashion.       A formal timeout was completed, including confirmation of History and Physical, operative consent, surgical site, patient identification number, and preoperative antibiotic administration.       Attention was then turned to creation of posterior portal with a 11 blade followed by insertion of blunt trocar and cannula.  Scope was inserted at this point time.  Anterior portal was then made in the rotator interval with spinal needle using outside in technique.  Cannula was then inserted over a trocar and diagnostic arthroscopic exam was carried out at this point in time with findings as noted above.     Attention was then turned to debridement of glenohumeral joint space including debridement of the superior labral  fraying with combination of hand-held shaver and ablation wand.    Attention was also turned to gentle debridement of the high-grade partial-thickness articular sided tear primarily involving the supraspinatus with ablation wand as well as shaver at this point in time.  Site of partial-thickness tear was identified with a PDS suture parrot beak from the subacromial space, into the region of the rotator cuff defect, and retrieved anteriorly through the anterior portal.     Attention was then turned to release of the biceps tendon. Arthroscopic scissor was inserted through the anterior portal and used to release the biceps tendon at the superior labrum. The superior labrum was then debrided to a smooth stable edge with no residual prominence noted with use of a hand-held shaver as well as ablation wand at this time.     Attention was then turned to open biceps tenodesis.  4 cm longitudinal incision was made centered over the inferior border of the pectoralis major through skin only with 15 blade at this point time.  Once subcutaneous dissection was carried out to the inferior border the pectoralis major was bluntly elevated proximal lateral and retracted at this time.  Biceps tendon was identified at this point time and retrieved with the right angle.  Q fix guide was placed in the bicipital groove of the proximalmost portion identified under direct visualization.  Drill was passed in unicortical fashion followed by insertion of Q fix anchor which was secured into position with good stability on tension across the suture strands.  Biceps tendon was then whipstitched with a running locking stitch, using one suture strand from each pair.  The second strand was passed in simple fashion to the biceps tendon.  The musculotendinous junction was reapproximated to the inferior border of the pectoralis major.  Excess biceps tendon was resected and tenodesis was then completed with the tendon being tied down with 6 alternating  half hitches ensuring good loop and knot security of the repair site.  Sutures were cut short at this point time.  Wound was then thoroughly irrigated with normal saline.      Attention was then returned to the subacromial space where the scope was inserted through the posterior portal, cannula inserted through the anterior portal and subacromial space was evaluated at this point in time.  Debridement of subacromial bursitis was completed with shaver as well as ablation wand at this point time with no evidence of bursal sided rotator cuff tear or laxity in the rotator cuff tendons to suggest intrasubstance tear.      Site of PDS suture marking the high-grade articular partial-thickness tear of the supraspinatus was identified this point in time with no evidence of significant concomittent bursal sided tearing noted.  Thus elected to proceed with rotator cuff repair with use of Regenten bio inductive implant at this time.    Regenten medium sized bio inductive implant was then inserted with insertion device from the lateral portal and laid over top of the residual defect site of the rotator cuff tear.  Soft tissue staples were placed anterior, central, and posterior along the medial margin as well as along the anterior central margin to secure the graft.  Insertion device was removed at this point time and bone staples were placed x 2 laterally.  2 additional soft tissue staples were used to secure the anterior and posterior margins of the graft to the underlying cuff tissue.  Graft was noted to be stable on arthroscopic exam with motion and acceptable position of the graft.    Fluid was evacuated with suction and arthroscopic instruments were removed at this point time.     Attention was then turned to closure the wounds with biceps tenodesis site being closed with 3-0 Vicryl for subcutaneous closure in inverted fashion followed by skin closure with 3-0 Monocryl and Steri-Strips.  Portal sites were closed with 3-0  nylon in interrupted fashion.  Wounds were dressed with Xeroform gauze, 4 x 4, Tegaderm, ABD pad, Medipore tape and patient was placed in a sling with abduction pillow to the left side.     At the end of the procedure, all lap, needle, and sponge counts were correct x2. The patient had brisk capillary refill to all digits of the left upper extremity. Compartments were soft and easily compressible at the end of the procedure.       DISPOSITION: The patient was extubated per anesthesia and taken to the recovery room in stable condition. Will follow up in office in 1 week for wound check. Results discussed immediately after procedure with family and all questions were answered at that time.       REHAB:  Will place patient on Reg[employer]en rotator cuff repair protocol, sling x4 weeks for biceps tenodesis but needs to start into physical therapy at week 2 utilizing [employer] protocol

## 2022-11-04 NOTE — ANESTHESIA PROCEDURE NOTES
Airway  Urgency: elective    Date/Time: 11/4/2022 2:05 PM  Airway not difficult    General Information and Staff    Patient location during procedure: OR  CRNA/CAA: Larry Cross CRNA    Indications and Patient Condition  Indications for airway management: airway protection    Preoxygenated: yes  Mask difficulty assessment: 0 - not attempted    Final Airway Details  Final airway type: endotracheal airway      Successful airway: ETT  Cuffed: yes   Successful intubation technique: direct laryngoscopy  Endotracheal tube insertion site: oral  Blade: Root  Blade size: 3  ETT size (mm): 7.5  Cormack-Lehane Classification: grade I - full view of glottis  Placement verified by: chest auscultation and capnometry   Measured from: lips  ETT/EBT  to lips (cm): 22  Number of attempts at approach: 1  Assessment: lips, teeth, and gum same as pre-op and atraumatic intubation

## 2022-11-04 NOTE — H&P
History & Physical       Patient: Pj Steele    YOB: 1965    Medical Record Number: [id number]    Surgeon:  Dr. Parish Yin    Chief Complaints: Right shoulder pain, rotator cuff tear, biceps tendinitis      History of Present Illness: 57 y.o. male presents with right shoulder pain and rotator cuff tear as well as biceps tendinitis. Onset of symptoms was approximately 4 weeks ago and has been progressively worsening despite more conservative treatment measures.  Symptoms are associated with ability to move, lift, push, pull, and perform activities of daily living with affected extremity. Symptoms are aggravated by overhead motion, lifting, and pushing as well as ROM necessary for activities of daily living.   Symptoms improve with rest, ice and elevation only minimally.      Allergies:   Allergies   Allergen Reactions   • Statins Myalgia   • Amoxicillin Rash       Medications:   Home Medications:  No current facility-administered medications on file prior to encounter.     Current Outpatient Medications on File Prior to Encounter   Medication Sig   • FLUoxetine (PROzac) 40 MG capsule Take 1 capsule by mouth Daily.   • losartan (COZAAR) 100 MG tablet TAKE ONE TABLET BY MOUTH DAILY   • chlorthalidone (HYGROTON) 25 MG tablet TAKE ONE TABLET BY MOUTH DAILY   • Glucosamine-Chondroitin 6143-2905 MG/30ML liquid Take  by mouth Daily.   • Magnesium 250 MG tablet Take  by mouth.   • methocarbamol (ROBAXIN) 750 MG tablet Take 1 tablet by mouth 3 (Three) Times a Day.   • NON FORMULARY / PATIENT SUPPLIED MEDICATION Take 1 tablet by mouth Daily. affirm   • NON FORMULARY 300 mg. Effective enutra   • Omega-3 1000 MG capsule Take  by mouth.     Current Medications:  Scheduled Meds:sodium chloride, 10 mL, Intravenous, Q12H  vancomycin, 15 mg/kg, Intravenous, Once      Continuous Infusions:lactated ringers, 9 mL/hr, Last Rate: 9 mL/hr (11/04/22 1023)      PRN Meds:.•  lidocaine PF 1%  •  midazolam  •  sodium  chloride  •  sodium chloride    I have reviewed the patient's medical history in detail and updated the computerized patient record.  Review and summarization of old records include:    Past Medical History:   Diagnosis Date   • Arthritis    • Back pain    • Depression     Mild   • Hypertension    • Hypotestosteronemia    • Neck strain 1993   • Periarthritis of shoulder 1993   • Polyp of colon, adenomatous    • Polyp of colon, hyperplastic    • Rotator cuff syndrome 2021    MRI?        Past Surgical History:   Procedure Laterality Date   • CHOLECYSTECTOMY     • COLONOSCOPY N/A 04/24/2018    Procedure: COLONOSCOPY, polypectomy;  Surgeon: Josie Campos MD;  Location: [facility];  Service: Gastroenterology   • COLONOSCOPY N/A 05/06/2021    Procedure: COLONOSCOPY;  Surgeon: Anais Beltre MD;  Location: [facility] OR;  Service: Gastroenterology;  Laterality: N/A;  diverticulosis   • WRIST SURGERY          Social History     Occupational History   • Not on file   Tobacco Use   • Smoking status: Never   • Smokeless tobacco: Never   Vaping Use   • Vaping Use: Never used   Substance and Sexual Activity   • Alcohol use: Yes     Alcohol/week: 4.0 standard drinks     Types: 4 Cans of beer per week     Comment: occasional   • Drug use: Never   • Sexual activity: Yes     Partners: Female     Birth control/protection: Surgical, Vasectomy      Social History     Social History Narrative   • Not on file        Family History   Problem Relation Age of Onset   • Colon cancer Mother    • Broken bones Mother         Wrist   • Diabetes Mother    • Osteoporosis Mother    • Leukemia Father    • Dementia Father    • Brain cancer Brother    • Malig Hyperthermia Neg Hx        ROS: 14 point review of systems was performed and was negative except for documented findings in HPI and today's encounter.     Allergies:   Allergies   Allergen Reactions   • Statins Myalgia   • Amoxicillin Rash     Constitutional:  Denies fever, shaking or chills    Eyes:  Denies change in visual acuity   HENT:  Denies nasal congestion or sore throat   Respiratory:  Denies cough or shortness of breath   Cardiovascular:  Denies chest pain or severe LE edema   GI:  Denies abdominal pain, nausea, vomiting, bloody stools or diarrhea   Musculoskeletal:  Denies numbness tingling or loss of motor function except as outlined above in history of present illness.  : Denies painful urination or hematuria  Integument:  Denies rash, lesion or ulceration   Neurologic:  Denies headache or focal weakness  Endocrine:  Denies lymphadenopathy  Psych:  Denies confusion or change in mental status   Hem:  Denies active bleeding    Physical Exam: 57 y.o. male  Body mass index is 25.47 kg/m².  Vitals:    11/04/22 1013   BP: (!) 146/108   Pulse: 79   Resp: 16   Temp:    SpO2: 96%     Vital signs reviewed.   General Appearance:    Alert, cooperative, in no acute distress                  Eyes: conjunctiva clear  ENT: external ears and nose atraumatic  CV: no peripheral edema  Resp: normal respiratory effort  Skin: no rashes or wounds; normal turgor  Psych: mood and affect appropriate  Lymph: no nodes appreciated  Neuro: gross sensation intact  Vascular:  Palpable peripheral pulse in noted extremity  Musculoskeletal Extremities:   Right shoulder-  FF-       Active- 50 degrees  Passive- 95 degrees  Strength- 3/5  ER- Active- 20 degrees  Passive 40 degrees  Strength- 3/5  Belly press test strength- 4+/5  Bear hug sign- Negative  Empty can test- Positive  Drop arm test- Mildly positive  Ext rotation lag sign- Mildly positive  Cross arm test- Negative  Tenderness over AC joint- Negative  Uintah's- Positive  Brisk cap refill to all digits, palpable 2+ radial pulse  Positive deltoid firing in all three components    Debilities/Disabilities Identified: None      Diagnostic Tests:  Pre-Admission Testing on 10/28/2022   Component Date Value Ref Range Status   • PTT 10/28/2022 31.0  24.3 - 38.1 seconds Final    • Protime 10/28/2022 12.8  12.1 - 15.0 Seconds Final   • INR 10/28/2022 0.95  0.90 - 1.10 Final   • QT Interval 10/28/2022 419  ms Final       MRI Right shoulder  IMPRESSION:     1. Full-thickness, essentially fullwidth tear of the infraspinatus tendon, retracted at least 3 cm over the middle third of the humeral head. Moderate to high-grade strain of the infraspinatus muscle without evidence of atrophy.  2. Mild supraspinatus tendinopathy without tear.  3. Intact long head biceps tendon and glenoid labrum.  4. Mild AC joint arthrosis without significant outlet impingement or bursal inflammation.           Signer Name: Son Huston MD   Signed: 10/9/2022 8:16 AM   Workstation Name: [employer]    [facility]    Assessment:  Patient Active Problem List   Diagnosis   • Hypercholesterolemia   • Mixed hyperlipidemia   • Essential hypertension   • Mild episode of recurrent major depressive disorder (HCC)   • Chest pain, atypical   • Colon cancer screening   • Screening for prostate cancer   • Need for influenza vaccination   • Squamous cell carcinoma of skin of left upper extremity, including shoulder   • Basal cell carcinoma (BCC) of skin of neck   • Need for tetanus, diphtheria, and acellular pertussis (Tdap) vaccine in patient of adolescent age or older   • History of colonic polyps   • Annual physical exam   • Complete tear of right rotator cuff   • Preoperative clearance       Plan:   Patient wishes to proceed with right shoulder arthroscopy, rotator cuff debridement versus repair, possible biceps tenodesis, subacromial decompression, and all associated procedures.  I reviewed risks benefits and alternatives the procedure with risks including not limited to neurovascular damage, bleeding, infection, chronic pain, re-tear rotator cuff, failure of healing rotator cuff, loss of motion, weakness, stiffness, instability, biceps sag, DVT, pulmonary embolus, death, stroke, complex regional pain  syndrome, myocardial infarction, need for additional procedures. He understood all these had all questions answered.  Patient consented to proceed with surgery.  No guarantees were given regarding results of surgery.      Pj Steele was given the opportunity to ask and have all questions answered today.  The patient voiced understanding of the risks, benefits, and alternative forms of treatment that were discussed and the patient consents to proceed with surgery.     Discharge Plan: to home in  Care of family/friend    Parish Yin MD      Dragon transcription disclaimer     Much of this encounter note is an electronic transcription/translation of spoken language to printed text. The electronic translation of spoken language may permit erroneous, or at times, nonsensical words or phrases to be inadvertently transcribed. Although I have reviewed the note for such errors, some may still exist.

## 2022-11-04 NOTE — ANESTHESIA POSTPROCEDURE EVALUATION
"Patient: Pj Steele    Procedure Summary     Date: 11/04/22 Room / Location:  [facility] OR 2 / [facility]    Anesthesia Start: 1355 Anesthesia Stop: 1605    Procedure: SHOULDER ARTHROSCOPY WITH ROTATOR CUFF REPAIR,  OPEN BICEP TENODESIS (Right: Shoulder) Diagnosis:       Complete tear of right rotator cuff, unspecified whether traumatic      (Complete tear of right rotator cuff, unspecified whether traumatic [M75.121])    Surgeons: Parish Yin MD Provider: Larry Cross CRNA    Anesthesia Type: general with block ASA Status: 2          Anesthesia Type: general with block    Vitals  Vitals Value Taken Time   /67 11/04/22 1645   Temp 97.5 °F (36.4 °C) 11/04/22 1614   Pulse 73 11/04/22 1650   Resp 12 11/04/22 1640   SpO2 93 % 11/04/22 1648   Vitals shown include unvalidated device data.        Post Anesthesia Care and Evaluation    Patient location during evaluation: PHASE II  Patient participation: complete - patient participated  Level of consciousness: awake and alert  Pain scale: Denies pain, feels like a \"mosquito bite\" at site of biceps tenodesis.  Anesthetic complications: No anesthetic complications  PONV Status: none  Cardiovascular status: acceptable  Respiratory status: acceptable  Hydration status: acceptable      "

## 2022-11-04 NOTE — ANESTHESIA PREPROCEDURE EVALUATION
Anesthesia Evaluation     Patient summary reviewed and Nursing notes reviewed   no history of anesthetic complications:  NPO Solid Status: > 8 hours  NPO Liquid Status: > 2 hours           Airway   Mallampati: II  TM distance: >3 FB  Neck ROM: full  No difficulty expected  Dental      Pulmonary     breath sounds clear to auscultation  Sleep apnea: snores   Cardiovascular   Exercise tolerance: good (4-7 METS)    ECG reviewed  PT is on anticoagulation therapy  Rhythm: regular  Rate: normal    (+) hypertension well controlled less than 2 medications, hyperlipidemia (not treated, diet controlled ),     ROS comment: Narrative & Impression      HEART RATE= 62  bpm  RR Interval= 972  ms  RI Interval= 164  ms  P Horizontal Axis= 34  deg  P Front Axis= 4  deg  QRSD Interval= 102  ms  QT Interval= 419  ms  QRS Axis= -6  deg  T Wave Axis= 16  deg  - NORMAL ECG -  Sinus rhythm  No change from prior tracing  Electronically Signed By: Sundar Cleveland ([facility]) 28-Oct-2022 18:22:49  Date and Time of Study: 2022-10-28 14:13:52    Specimen Collected: 10/28/22 14:13 EDT Last Resulted: 10/28/22 18:22 EDT          Neuro/Psych  (+) psychiatric history Depression,    GI/Hepatic/Renal/Endo      Musculoskeletal     (+) back pain (LBP),   Abdominal    Substance History   (+) alcohol use (weekly 1-2 drinks ),      OB/GYN          Other   arthritis,    history of cancer (skin )                    Anesthesia Plan    ASA 2     general with block     intravenous induction     Anesthetic plan, risks, benefits, and alternatives have been provided, discussed and informed consent has been obtained with: patient.    Use of blood products discussed with patient  Consented to blood products.       CODE STATUS:

## 2022-11-08 ENCOUNTER — TELEPHONE (OUTPATIENT)
Dept: ORTHOPEDIC SURGERY | Facility: CLINIC | Age: 57
End: 2022-11-08

## 2022-11-08 NOTE — TELEPHONE ENCOUNTER
Called and left patient a voicemail- he is ok to take the top layer of bandages off and leave the clear tegaderm dressing in place as this is the waterproof barrier. Patient encouraged to contact the office at [phone] with any further questions.    Thanks.

## 2022-11-08 NOTE — TELEPHONE ENCOUNTER
Caller: THERESE OWEN    Relationship to patient: SELF    Best call back number:     Patient is needing: THE PATIENT HAD SHOULDER SX WITH DR SMITH ON 11/4/22. HE STATED THE DISCHARGE PAPERWORK SAYS TO CHANGE THE BANDAGES AFTER 3 DAYS BUT THERE ARE 2 LAYERS OF BANDAGES AND HE WOULD LIKE TO KNOW IF HE NEEDS TO REMOVE AND REPLACE BOTH LAYERS OR JUST THE TOP LAYER.    PLEASE CALL THE PATIENT AT THE NUMBER ABOVE AND ADVISE.

## 2022-11-11 ENCOUNTER — OFFICE VISIT (OUTPATIENT)
Dept: ORTHOPEDIC SURGERY | Facility: CLINIC | Age: 57
End: 2022-11-11

## 2022-11-11 VITALS — BODY MASS INDEX: 25.41 KG/M2 | WEIGHT: 198 LBS | HEIGHT: 74 IN

## 2022-11-11 DIAGNOSIS — Z98.890 STATUS POST ARTHROSCOPY OF RIGHT SHOULDER: Primary | ICD-10-CM

## 2022-11-11 PROCEDURE — 99024 POSTOP FOLLOW-UP VISIT: CPT | Performed by: NURSE PRACTITIONER

## 2022-11-11 RX ORDER — CYCLOBENZAPRINE HCL 5 MG
5 TABLET ORAL NIGHTLY PRN
Qty: 30 TABLET | Refills: 0 | Status: SHIPPED | OUTPATIENT
Start: 2022-11-11 | End: 2023-01-11 | Stop reason: SDUPTHER

## 2022-11-11 NOTE — PROGRESS NOTES
CC: F/u s/p right shoulder arthroscopic rotator cuff repair with bio inductive implant, biceps tenodesis DOS 11/04/2022    Interval History: Patient returns to clinic stating pain is doing fairly well, has been using sling as instructed, denies any numbness or tingling over right arm. No fevers, chills, or sweats, and no drainage from incisions noted.    Exam:   Right shoulder- incisions clean, dry, sutures in place   Positive sensation all distributions right hand and proximal lateral aspect arm   Cap refill < 3 seconds, radial pulse 2+   Positive deltoid firing   Flex/extend fingers/thumb/wrist with 4+/5 strength, positive thumbs up, okay sign, cross finger adduction and abduction against resistance     Impression: s/p right shoulder rotator cuff repair with bio inductive implant, biceps tenodesis     Plan:  1. D/c sutures today and replace with steri-strips- may shower, no submerging wounds x 4 weeks.  2. F/u in 3 wks with Dr. Yin.  3. Will start PT at 2 wk marin post-op utilizing [facility] rotator cuff repair protocol, sling x4 weeks for biceps tenodesis. Continue use of sling x 4 weeks total. Work on finger and wrist ROM. May do gentle elbow ROM 2x/day while out of sling for showering or changing clothes.   4. All questions answered      New Medications Ordered This Visit   Medications   • cyclobenzaprine (FLEXERIL) 5 MG tablet     Sig: Take 1 tablet by mouth At Night As Needed for Muscle Spasms.     Dispense:  30 tablet     Refill:  0       Orders Placed This Encounter   Procedures   • Ambulatory Referral to Physical Therapy POST OP     Referral Priority:   Routine     Referral Type:   Physical Therapy     Referral Reason:   Specialty Services Required     Requested Specialty:   Physical Therapy     Number of Visits Requested:   1

## 2022-11-16 ENCOUNTER — TREATMENT (OUTPATIENT)
Dept: PHYSICAL THERAPY | Facility: CLINIC | Age: 57
End: 2022-11-16

## 2022-11-16 DIAGNOSIS — Z78.9 DIFFICULTY PERFORMING PERSONAL GROOMING ACTIVITY: ICD-10-CM

## 2022-11-16 DIAGNOSIS — Z98.890 S/P RIGHT ROTATOR CUFF REPAIR: Primary | ICD-10-CM

## 2022-11-16 DIAGNOSIS — R29.898 SHOULDER WEAKNESS: ICD-10-CM

## 2022-11-16 DIAGNOSIS — M25.611 DECREASED ROM OF RIGHT SHOULDER: ICD-10-CM

## 2022-11-16 PROCEDURE — 97110 THERAPEUTIC EXERCISES: CPT | Performed by: PHYSICAL THERAPIST

## 2022-11-16 PROCEDURE — 97161 PT EVAL LOW COMPLEX 20 MIN: CPT | Performed by: PHYSICAL THERAPIST

## 2022-11-16 PROCEDURE — 97014 ELECTRIC STIMULATION THERAPY: CPT | Performed by: PHYSICAL THERAPIST

## 2022-11-16 NOTE — PROGRESS NOTES
Physical Therapy Initial Evaluation and Plan of Care        Patient: Pj Steele   : 1965  Diagnosis/ICD-10 Code:  S/P right rotator cuff repair [Z98.890]  Referring practitioner: CARISSA Mota  Date of Initial Visit: 2022  Today's Date: 2022  Patient seen for 1 sessions           Subjective Questionnaire: QuickDASH: 79.55      Subjective Evaluation    History of Present Illness  Date of surgery: 2022  Mechanism of injury: Pt presents to physical therapy s/p R RTC repair and open bicep tenodesis on 22. Pt reports he fell down the steps on Saturday but did not hit his shoulder.    Per MD note: [facility] rotator cuff repair protocol, sling x 4 weeks for biceps tenodesis.Work on finger and wrist ROM. May do gentle elbow ROM 2x/day while out of sling for showering or changing clothes.      Patient Occupation: -has to use both arms Quality of life: excellent    Pain  Current pain ratin (took pain meds before)  At best pain ratin  Location: R shoulder  Quality: discomfort, sharp, tight and dull ache  Relieving factors: ice, medications, support, rest and change in position  Aggravating factors: movement, lifting, standing, overhead activity, ambulation, sleeping, prolonged positioning, repetitive movement and outstretched reach    Hand dominance: right    Diagnostic Tests  No diagnostic tests performed    Treatments  Previous treatment: physical therapy (for his back)  Patient Goals  Patient goals for therapy: decreased edema, decreased pain, improved balance, increased motion, increased strength, independence with ADLs/IADLs, return to sport/leisure activities and return to work             Objective          Postural Observations  Seated posture: fair  Standing posture: fair        Observations     Right Shoulder  Positive for effusion and incision.     Additional Shoulder Observation Details  No s/s of infection; steri-strips in place    Palpation     Right   No  palpable tenderness to the rhomboids, serratus anterior, subclavius, thoracic paraspinals and triceps.   Hypertonic in the biceps, levator scapulae, pectoralis major, pectoralis minor and upper trapezius. Tenderness of the biceps, middle deltoid, posterior deltoid, subscapularis, supraspinatus and teres minor.     Tenderness     Right Shoulder  Tenderness in the AC joint, acromion, biceps tendon (proximal), coracoid process, infraspinatus tendon, scapular spine, subscapularis tendon and supraspinatus tendon. No tenderness in the clavicle, lateral scapula, medial scapula and SC joint.     Cervical/Thoracic Screen   Cervical range of motion within normal limits  Cervical range of motion within normal limits with the following exceptions: Tightness but no pain    Neurological Testing     Sensation     Shoulder   Left Shoulder   Intact: light touch    Right Shoulder   Intact: light touch    Additional Neurological Details  Pt reports intermittent numbness/tingling R hand with prolonged positioning especially at night    Active Range of Motion     Additional Active Range of Motion Details  Defer due to surgery    Passive Range of Motion     Right Shoulder   Flexion: 63 degrees with pain  External rotation 0°: 0 degrees with pain    Strength/Myotome Testing     Additional Strength Details  Deferred due to surgery    Tests     Additional Tests Details  Deferred due to surgery          Assessment & Plan     Assessment  Impairments: abnormal muscle firing, abnormal muscle tone, abnormal or restricted ROM, activity intolerance, impaired physical strength, lacks appropriate home exercise program, pain with function and weight-bearing intolerance  Functional Limitations: carrying objects, lifting, sleeping, pulling, pushing, uncomfortable because of pain, moving in bed, reaching behind back, reaching overhead and unable to perform repetitive tasks  Assessment details: Pj Steele is a 57 y.o. year-old male referred to  physical therapy for R RTC and open bicep tenodesis repair (11/4/22). He presents with a stable clinical presentation. He has no comorbidities but personal factors of a physical job that may affect his progress in the plan of care.  Signs and symptoms are consistent with physical therapy diagnosis of impaired R shoulder ROM, strength and flexibility. Pain limiting PROM of R shoulder today; deferred strength testing due to recent surgery. Patient is appropriate for skilled physical therapy in order to reduce pain and increase ease with daily mobility. During evaluation, pt educated on anatomy, goal of interventions, sleeping position/sling wear, and body mechanics to promote healthy lifestyle and improve quality of life.    Goals  Plan Goals: STG: ~8 weeks  1. Pt will demonstrate R shoulder PROM in flexion and abduction of >140 degrees to improve overhead activity  2. Pt will demonstrate R shoulder PROM in ER to 30 degrees  3. Decrease right UE tenderness with palpation to minimal over the acromion and RTC tendon to be able to sleep on his side  4. Pt will be able to progress to AROM of R shoulder without compensation of R UT and appropriate scapulo-humeral motion.  5. Pt will improve QuickDASH score to 40 or less to improve subjective function of shoulder with ADLs    LTG: ~16 weeks  1. Pt will demonstrate R shoulder AROM WNL and minimal pain to be able to reach in multiple planes/directions  2. Pt will be able to reach lumbar spine using RUE  3. Pt will improve R shoulder strength to 5/5 to improve quality of life  4. Pt will improve QuickDASH score to 10 or less to improve subjective function of shoulder with ADLs  5. Pt will be able to return to work with full function of R shoulder       Plan  Therapy options: will be seen for skilled therapy services  Planned modality interventions: cryotherapy, electrical stimulation/Russian stimulation, iontophoresis, TENS, thermotherapy (hydrocollator packs), ultrasound and  dry needling  Planned therapy interventions: ADL retraining, balance/weight-bearing training, body mechanics training, fine motor coordination training, flexibility, functional ROM exercises, home exercise program, IADL retraining, joint mobilization, manual therapy, neuromuscular re-education, postural training, soft tissue mobilization, spinal/joint mobilization, strengthening, stretching and therapeutic activities  Treatment plan discussed with: patient  Plan details: 2 times per week for 16 weeks        Visit Diagnoses:    ICD-10-CM ICD-9-CM   1. S/P right rotator cuff repair  Z98.890 V45.89   2. Decreased ROM of right shoulder  M25.611 719.51   3. Shoulder weakness  R29.898 719.61   4. Difficulty performing personal grooming activity  Z78.9 V15.81       Timed:  Manual Therapy:    5     mins  86671;  Therapeutic Exercise:    20     mins  12721;     Neuromuscular Renita:    -    mins  45021;    Therapeutic Activity:     -     mins  28114;     Gait Training:      -     mins  98288;     Ultrasound:     -     mins  53801;    Electrical Stimulation:    -     mins  96501 ( );    Untimed:  Electrical Stimulation:    15     mins  90420 ( );  Mechanical Traction:    -     mins  31763;     Timed Treatment:   25   mins   Total Treatment:     60   mins    PT SIGNATURE: Celine Rios PT   [place] license: [id number]  DATE TREATMENT INITIATED: 11/16/2022    Initial Certification  Certification Period: 2/14/2023  I certify that the therapy services are furnished while this patient is under my care.  The services outlined above are required by this patient, and will be reviewed every 90 days.     PHYSICIAN: Neva Hua APRN      DATE:     Please sign and return via fax to [phone]. Thank you, [facility].

## 2022-11-17 RX ORDER — MELOXICAM 15 MG/1
TABLET ORAL
Qty: 30 TABLET | Refills: 0 | Status: SHIPPED | OUTPATIENT
Start: 2022-11-17 | End: 2022-12-16

## 2022-11-18 ENCOUNTER — TREATMENT (OUTPATIENT)
Dept: PHYSICAL THERAPY | Facility: CLINIC | Age: 57
End: 2022-11-18

## 2022-11-18 DIAGNOSIS — R29.898 SHOULDER WEAKNESS: ICD-10-CM

## 2022-11-18 DIAGNOSIS — M25.611 DECREASED ROM OF RIGHT SHOULDER: ICD-10-CM

## 2022-11-18 DIAGNOSIS — Z98.890 S/P RIGHT ROTATOR CUFF REPAIR: Primary | ICD-10-CM

## 2022-11-18 DIAGNOSIS — Z78.9 DIFFICULTY PERFORMING PERSONAL GROOMING ACTIVITY: ICD-10-CM

## 2022-11-18 PROCEDURE — 97110 THERAPEUTIC EXERCISES: CPT | Performed by: PHYSICAL THERAPIST

## 2022-11-18 PROCEDURE — 97014 ELECTRIC STIMULATION THERAPY: CPT | Performed by: PHYSICAL THERAPIST

## 2022-11-18 PROCEDURE — 97140 MANUAL THERAPY 1/> REGIONS: CPT | Performed by: PHYSICAL THERAPIST

## 2022-11-18 NOTE — PROGRESS NOTES
Physical Therapy Daily Treatment Note    Patient: Pj Steele   : 1965  Referring practitioner: CARISSA Mota  Date of Initial Visit: Type: THERAPY  Noted: 2022  Today's Date: 2022  Patient seen for 2 sessions       Visit Diagnoses:    ICD-10-CM ICD-9-CM   1. S/P right rotator cuff repair  Z98.890 V45.89   2. Decreased ROM of right shoulder  M25.611 719.51   3. Shoulder weakness  R29.898 719.61   4. Difficulty performing personal grooming activity  Z78.9 V15.81       Pj Steele reports: pt is still using Tylenol and ice.  Pt was sore from the initial evaluation.  Pt is sleeping in a bed with sling and HOB elelvated.  He was able to sleep on his side a little last night.        Subjective       Objective   See Exercise, Manual, and Modality Logs for complete treatment.       Assessment & Plan     Assessment    Assessment details: Pt is tolerating treatment fairly well with some soreness from his initial evaluation.  Pt was able to perform all gentle ROM and scapular strengthening exercises without pain.  Pt still has difficulty relaxing during passive stretching, but with cueing and shoulder oscillations pt was able to tolerate PROM in all planes.  Pt's PROM still very limited with flex and scaption still below 90 degrees.  Will continue to see pt 2-3x/week for stretching, strengthening, and modalities PRN for pain.          Progress per Plan of Care      Timed:         Manual Therapy:    15     mins  33754;     Therapeutic Exercise:    20     mins  65043;     Neuromuscular Renita:        mins  00473;    Therapeutic Activity:          mins  84471;     Gait Training:           mins  80621;     Ultrasound:          mins  81990;    Ionto                                   mins   58237  Self Care                            mins   10945  Canalith Repos         mins 18384      Un-Timed:  Electrical Stimulation:    15     mins  47354 ( );  Dry Needling          mins self-pay  Traction           mins 14400      Timed Treatment:  35    mins   Total Treatment:     53   mins    Emily Dela Cruz, PT  [place] License: [id number]

## 2022-11-21 ENCOUNTER — TREATMENT (OUTPATIENT)
Dept: PHYSICAL THERAPY | Facility: CLINIC | Age: 57
End: 2022-11-21

## 2022-11-21 DIAGNOSIS — M25.611 DECREASED ROM OF RIGHT SHOULDER: ICD-10-CM

## 2022-11-21 DIAGNOSIS — Z78.9 DIFFICULTY PERFORMING PERSONAL GROOMING ACTIVITY: ICD-10-CM

## 2022-11-21 DIAGNOSIS — R29.898 SHOULDER WEAKNESS: ICD-10-CM

## 2022-11-21 DIAGNOSIS — Z98.890 S/P RIGHT ROTATOR CUFF REPAIR: Primary | ICD-10-CM

## 2022-11-21 PROCEDURE — 97110 THERAPEUTIC EXERCISES: CPT | Performed by: PHYSICAL THERAPIST

## 2022-11-21 PROCEDURE — 97014 ELECTRIC STIMULATION THERAPY: CPT | Performed by: PHYSICAL THERAPIST

## 2022-11-21 PROCEDURE — 97140 MANUAL THERAPY 1/> REGIONS: CPT | Performed by: PHYSICAL THERAPIST

## 2022-11-21 NOTE — PROGRESS NOTES
Physical Therapy Daily Treatment Note      Patient: Pj Steele   : 1965  Referring practitioner: CARISSA Mota  Date of Initial Visit: Type: THERAPY  Noted: 2022  Today's Date: 2022  Patient seen for 3 sessions         Pj Steele reports: mild pain over the weekend; tried to sleep without wedge pillow and more sore this morning.        QUICKDASH: 61.36       Objective   See Exercise, Manual, and Modality Logs for complete treatment.     PROM  Flexion: ~120 degrees  Scaption: ~ 120 degrees  ER: ~5 degrees past neutral      Assessment/Plan  Pt continues to progress PROM of R shoulder per protocol s/p 2 weeks R RTC repair and bicep tenodesis. Pt completing wrist and hand exercises without pillow for support today per his tolerance; had to use LUE for support towards end of exercises with increased pain and shoulder fatigue out of sling. Continued e-stim and ice post PROM and exercises per pain control.        Progress per Plan of Care and Progress strengthening /stabilization /functional activity         Timed:  Manual Therapy:    15     mins  42154;  Therapeutic Exercise:    12     mins  08275;     Neuromuscular Renita:    -    mins  61606;    Therapeutic Activity:     -     mins  19695;     Gait Training:      -     mins  45033;     Ultrasound:     -     mins  00216;      Untimed:  Electrical Stimulation:    15     mins  14240 ( );  Mechanical Traction:    -     mins  83112;   Dry needling:      -     mins  21252/    Timed Treatment:   27   mins   Total Treatment:     50   mins  Celine Rios PT  Physical Therapist    License #: [id number]

## 2022-11-23 ENCOUNTER — TREATMENT (OUTPATIENT)
Dept: PHYSICAL THERAPY | Facility: CLINIC | Age: 57
End: 2022-11-23

## 2022-11-23 DIAGNOSIS — Z78.9 DIFFICULTY PERFORMING PERSONAL GROOMING ACTIVITY: ICD-10-CM

## 2022-11-23 DIAGNOSIS — R29.898 SHOULDER WEAKNESS: ICD-10-CM

## 2022-11-23 DIAGNOSIS — Z98.890 S/P RIGHT ROTATOR CUFF REPAIR: Primary | ICD-10-CM

## 2022-11-23 DIAGNOSIS — M25.611 DECREASED ROM OF RIGHT SHOULDER: ICD-10-CM

## 2022-11-23 PROCEDURE — 97014 ELECTRIC STIMULATION THERAPY: CPT | Performed by: PHYSICAL THERAPIST

## 2022-11-23 PROCEDURE — 97140 MANUAL THERAPY 1/> REGIONS: CPT | Performed by: PHYSICAL THERAPIST

## 2022-11-23 PROCEDURE — 97110 THERAPEUTIC EXERCISES: CPT | Performed by: PHYSICAL THERAPIST

## 2022-11-23 NOTE — PROGRESS NOTES
Physical Therapy Daily Treatment Note      Patient: Pj Steele   : 1965  Referring practitioner: CARISSA Mota  Date of Initial Visit: Type: THERAPY  Noted: 2022  Today's Date: 2022  Patient seen for 4 sessions         Pj Steele reports: R shoulder pain 0/10 but took medication prior to today's session.         Objective   See Exercise, Manual, and Modality Logs for complete treatment.       Assessment/Plan  Pt is 2.5 s/p R shoulder surgery with improving tolerance of exercises outside of sling. Improving PROM; most pain with flexion with arm elevation and return to neutral. ER PROM slightly past neutral ~10 degrees. Steri-strips still in place. Plan to continue to progress per protocol and ice/e-stim for pain control.        Progress per Plan of Care and Progress strengthening /stabilization /functional activity           Timed:  Manual Therapy:    15     mins  59218;  Therapeutic Exercise:    17     mins  78995;     Neuromuscular Renita:    -    mins  13364;    Therapeutic Activity:     -     mins  35360;     Gait Training:      --     mins  39156;     Ultrasound:     -     mins  95518;      Untimed:  Electrical Stimulation:    15     mins  01655 ( );  Mechanical Traction:    -     mins  04155;   Dry needling:      -     mins  74783/    Timed Treatment:   32   mins   Total Treatment:     50   mins  Celine Rios PT  Physical Therapist    License #: [id number]

## 2022-11-29 ENCOUNTER — TREATMENT (OUTPATIENT)
Dept: PHYSICAL THERAPY | Facility: CLINIC | Age: 57
End: 2022-11-29

## 2022-11-29 DIAGNOSIS — M25.611 DECREASED ROM OF RIGHT SHOULDER: ICD-10-CM

## 2022-11-29 DIAGNOSIS — Z98.890 S/P RIGHT ROTATOR CUFF REPAIR: Primary | ICD-10-CM

## 2022-11-29 DIAGNOSIS — Z78.9 DIFFICULTY PERFORMING PERSONAL GROOMING ACTIVITY: ICD-10-CM

## 2022-11-29 DIAGNOSIS — R29.898 SHOULDER WEAKNESS: ICD-10-CM

## 2022-11-29 PROCEDURE — 97014 ELECTRIC STIMULATION THERAPY: CPT | Performed by: PHYSICAL THERAPIST

## 2022-11-29 PROCEDURE — 97140 MANUAL THERAPY 1/> REGIONS: CPT | Performed by: PHYSICAL THERAPIST

## 2022-11-29 PROCEDURE — 97110 THERAPEUTIC EXERCISES: CPT | Performed by: PHYSICAL THERAPIST

## 2022-11-29 NOTE — PROGRESS NOTES
Physical Therapy Daily Treatment Note      Patient: Pj Steele   : 1965  Referring practitioner: CARISSA Mota  Date of Initial Visit: Type: THERAPY  Noted: 2022  Today's Date: 2022  Patient seen for 5 sessions         Pj Steele reports: I am doing ok; pain rating in R shoulder 1/10 today. Busy over the holiday weekend.       Objective     PROM  R shoulder  Flexion: 133 degrees  ABD: 130 degrees  ER at 0 degrees: 36 degrees    See Exercise, Manual, and Modality Logs for complete treatment.       Assessment/Plan  Pt is 3.5 weeks s/p RTC repair and bicep tenodesis; PROM limited by pain with soft end feel. Incisions healing well; steri-strips still in place. Pt compliant with HEP and sling wear. Continue to progress per MD orders/protocol with plan to initiate AAROM of R shoulder at 4 week marin.       Progress per Plan of Care and Progress strengthening /stabilization /functional activity           Timed:  Manual Therapy:    15     mins  94814;  Therapeutic Exercise:    15     mins  48479;     Neuromuscular Renita:    -    mins  68074;    Therapeutic Activity:     -     mins  41498;     Gait Training:      -     mins  86577;     Ultrasound:     -     mins  27230;      Untimed:  Electrical Stimulation:    15     mins  15497 ( );  Mechanical Traction:    -     mins  33407;   Dry needling:      -     mins  80137/    Timed Treatment:   30   mins   Total Treatment:     45   mins    Celine Rios PT  Physical Therapist  License #: [id number]

## 2022-12-01 ENCOUNTER — OFFICE VISIT (OUTPATIENT)
Dept: ORTHOPEDIC SURGERY | Facility: CLINIC | Age: 57
End: 2022-12-01

## 2022-12-01 VITALS — WEIGHT: 198 LBS | HEIGHT: 74 IN | BODY MASS INDEX: 25.41 KG/M2

## 2022-12-01 DIAGNOSIS — Z98.890 STATUS POST ARTHROSCOPY OF RIGHT SHOULDER: Primary | ICD-10-CM

## 2022-12-01 DIAGNOSIS — M75.111 INCOMPLETE TEAR OF RIGHT ROTATOR CUFF, UNSPECIFIED WHETHER TRAUMATIC: ICD-10-CM

## 2022-12-01 PROCEDURE — 99024 POSTOP FOLLOW-UP VISIT: CPT | Performed by: ORTHOPAEDIC SURGERY

## 2022-12-01 RX ORDER — PREDNISONE 10 MG/1
TABLET ORAL
Qty: 39 TABLET | Refills: 0 | Status: SHIPPED | OUTPATIENT
Start: 2022-12-01 | End: 2022-12-14

## 2022-12-01 NOTE — PROGRESS NOTES
CC: F/u s/p right shoulder rotator cuff repair and biceps tenodesis  DOS 2022    Interval History: Patient returns to clinic stating pain is doing fairly well, has been using sling as instructed, denies any numbness or tingling over right arm. No fevers, chills, or sweats, and no drainage from incisions noted. He has started into physical therapy.    Exam:   Right shoulder- incisions healing well   Tolerates FF- 1300,   ER- 25   Positive sensation all distributions right hand and proximal lateral aspect arm, positive deltoid firing   Cap refill < 3 seconds, radial pulse 2+   Positive deltoid firing   Flex/extend fingers/thumb/wrist with 4+/5 strength, positive thumbs up, okay sign, cross finger adduction and abduction against resistance    REHAB:  Will place patient on [facility] rotator cuff repair protocol, sling x4 weeks for biceps tenodesis but needs to start into physical therapy at week 2 utilizing [facility] protocol     Impression: s/p right shoulder rotator cuff repair and biceps tenodesis     Plan:  1. Continue PT for work on ROM and progressing into strengthening per protocol  2. Discontinue sling  3. Instructed on passive ROM exercises to be done multiple times daily at home  4. F/u in 4 weeks to evaluate motion and progress with PT  5. All questions answered  6. Prednisone taper to help with stiffness      New Medications Ordered This Visit   Medications   • predniSONE (DELTASONE) 10 MG tablet     Si mg po daily x 3 days, then 40 mg po daily x 3 days, then 20 mg po daily x 3 days, then 10 mg po daily x 3 days     Dispense:  39 tablet     Refill:  0       No orders of the defined types were placed in this encounter.

## 2022-12-02 ENCOUNTER — TREATMENT (OUTPATIENT)
Dept: PHYSICAL THERAPY | Facility: CLINIC | Age: 57
End: 2022-12-02

## 2022-12-02 ENCOUNTER — TRANSCRIBE ORDERS (OUTPATIENT)
Dept: ADMINISTRATIVE | Facility: HOSPITAL | Age: 57
End: 2022-12-02

## 2022-12-02 ENCOUNTER — LAB (OUTPATIENT)
Dept: LAB | Facility: HOSPITAL | Age: 57
End: 2022-12-02

## 2022-12-02 DIAGNOSIS — Z78.9 DIFFICULTY PERFORMING PERSONAL GROOMING ACTIVITY: ICD-10-CM

## 2022-12-02 DIAGNOSIS — Z98.890 S/P RIGHT ROTATOR CUFF REPAIR: Primary | ICD-10-CM

## 2022-12-02 DIAGNOSIS — R29.898 SHOULDER WEAKNESS: ICD-10-CM

## 2022-12-02 DIAGNOSIS — Z00.00 ROUTINE ADULT HEALTH MAINTENANCE: Primary | ICD-10-CM

## 2022-12-02 DIAGNOSIS — M25.611 DECREASED ROM OF RIGHT SHOULDER: ICD-10-CM

## 2022-12-02 DIAGNOSIS — Z00.00 ROUTINE ADULT HEALTH MAINTENANCE: ICD-10-CM

## 2022-12-02 LAB
ALBUMIN SERPL-MCNC: 5.1 G/DL (ref 3.5–5.2)
ALBUMIN/GLOB SERPL: 2.6 G/DL
ALP SERPL-CCNC: 52 U/L (ref 39–117)
ALT SERPL W P-5'-P-CCNC: 28 U/L (ref 1–41)
ANION GAP SERPL CALCULATED.3IONS-SCNC: 12.7 MMOL/L (ref 5–15)
AST SERPL-CCNC: 18 U/L (ref 1–40)
BASOPHILS # BLD AUTO: 0.02 10*3/MM3 (ref 0–0.2)
BASOPHILS NFR BLD AUTO: 0.1 % (ref 0–1.5)
BILIRUB SERPL-MCNC: 0.7 MG/DL (ref 0–1.2)
BUN SERPL-MCNC: 11 MG/DL (ref 6–20)
BUN/CREAT SERPL: 11.3 (ref 7–25)
CALCIUM SPEC-SCNC: 9.7 MG/DL (ref 8.6–10.5)
CHLORIDE SERPL-SCNC: 97 MMOL/L (ref 98–107)
CHOLEST SERPL-MCNC: 284 MG/DL (ref 0–200)
CO2 SERPL-SCNC: 26.3 MMOL/L (ref 22–29)
CREAT SERPL-MCNC: 0.97 MG/DL (ref 0.76–1.27)
DEPRECATED RDW RBC AUTO: 42.8 FL (ref 37–54)
EGFRCR SERPLBLD CKD-EPI 2021: 91.1 ML/MIN/1.73
EOSINOPHIL # BLD AUTO: 0.02 10*3/MM3 (ref 0–0.4)
EOSINOPHIL NFR BLD AUTO: 0.1 % (ref 0.3–6.2)
ERYTHROCYTE [DISTWIDTH] IN BLOOD BY AUTOMATED COUNT: 13.4 % (ref 12.3–15.4)
GLOBULIN UR ELPH-MCNC: 2 GM/DL
GLUCOSE SERPL-MCNC: 99 MG/DL (ref 65–99)
HCT VFR BLD AUTO: 44.8 % (ref 37.5–51)
HDLC SERPL-MCNC: 55 MG/DL (ref 40–60)
HGB BLD-MCNC: 15 G/DL (ref 13–17.7)
IMM GRANULOCYTES # BLD AUTO: 0.07 10*3/MM3 (ref 0–0.05)
IMM GRANULOCYTES NFR BLD AUTO: 0.5 % (ref 0–0.5)
LDLC SERPL CALC-MCNC: 196 MG/DL (ref 0–100)
LDLC/HDLC SERPL: 3.53 {RATIO}
LYMPHOCYTES # BLD AUTO: 1.17 10*3/MM3 (ref 0.7–3.1)
LYMPHOCYTES NFR BLD AUTO: 8.3 % (ref 19.6–45.3)
MCH RBC QN AUTO: 29.1 PG (ref 26.6–33)
MCHC RBC AUTO-ENTMCNC: 33.5 G/DL (ref 31.5–35.7)
MCV RBC AUTO: 86.8 FL (ref 79–97)
MONOCYTES # BLD AUTO: 1.09 10*3/MM3 (ref 0.1–0.9)
MONOCYTES NFR BLD AUTO: 7.7 % (ref 5–12)
NEUTROPHILS NFR BLD AUTO: 11.79 10*3/MM3 (ref 1.7–7)
NEUTROPHILS NFR BLD AUTO: 83.3 % (ref 42.7–76)
NRBC BLD AUTO-RTO: 0 /100 WBC (ref 0–0.2)
PLATELET # BLD AUTO: 328 10*3/MM3 (ref 140–450)
PMV BLD AUTO: 8.7 FL (ref 6–12)
POTASSIUM SERPL-SCNC: 4.2 MMOL/L (ref 3.5–5.2)
PROT SERPL-MCNC: 7.1 G/DL (ref 6–8.5)
RBC # BLD AUTO: 5.16 10*6/MM3 (ref 4.14–5.8)
SODIUM SERPL-SCNC: 136 MMOL/L (ref 136–145)
TRIGL SERPL-MCNC: 174 MG/DL (ref 0–150)
VLDLC SERPL-MCNC: 33 MG/DL (ref 5–40)
WBC NRBC COR # BLD: 14.16 10*3/MM3 (ref 3.4–10.8)

## 2022-12-02 PROCEDURE — 85025 COMPLETE CBC W/AUTO DIFF WBC: CPT

## 2022-12-02 PROCEDURE — 80061 LIPID PANEL: CPT

## 2022-12-02 PROCEDURE — 36415 COLL VENOUS BLD VENIPUNCTURE: CPT

## 2022-12-02 PROCEDURE — 97014 ELECTRIC STIMULATION THERAPY: CPT | Performed by: PHYSICAL THERAPIST

## 2022-12-02 PROCEDURE — 97140 MANUAL THERAPY 1/> REGIONS: CPT | Performed by: PHYSICAL THERAPIST

## 2022-12-02 PROCEDURE — 97110 THERAPEUTIC EXERCISES: CPT | Performed by: PHYSICAL THERAPIST

## 2022-12-02 PROCEDURE — 80053 COMPREHEN METABOLIC PANEL: CPT

## 2022-12-02 NOTE — PROGRESS NOTES
Physical Therapy Daily Treatment Note      Patient: Pj Steele   : 1965  Referring practitioner: CARISSA Mota  Date of Initial Visit: Type: THERAPY  Noted: 2022  Today's Date: 2022  Patient seen for 6 sessions         Pj Steele reports: good report from MD; no sling. Not sleeping well without sling support.            Objective   See Exercise, Manual, and Modality Logs for complete treatment.     AAROM  R shoulder   Flexion: 146 degrees with cane      Assessment/Plan  Pt is 4 weeks s/p R shoulder surgery ([facility] protocol) and still not loading biceps due to tenodesis; initiation of AAROM with cane, wall slides and pulleys today per protocol at 4 week marin. PT educated he will most likely be sore due to initiation of new exercises and to ice as needed over the weekend.       Progress per Plan of Care and Progress strengthening /stabilization /functional activity           Timed:  Manual Therapy:    13     mins  40134;  Therapeutic Exercise:    25     mins  93454;     Neuromuscular Renita:    -    mins  96882;    Therapeutic Activity:     -     mins  26794;     Gait Training:      -     mins  45781;     Ultrasound:     -     mins  95159;      Untimed:  Electrical Stimulation:    15     mins  05096 ( );  Mechanical Traction:    -     mins  72709;   Dry needling:      -     mins  27833/    Timed Treatment:   38   mins   Total Treatment:     55   mins    Celine Rios PT  Physical Therapist  License #: [id number]

## 2022-12-06 ENCOUNTER — TREATMENT (OUTPATIENT)
Dept: PHYSICAL THERAPY | Facility: CLINIC | Age: 57
End: 2022-12-06

## 2022-12-06 DIAGNOSIS — R29.898 SHOULDER WEAKNESS: ICD-10-CM

## 2022-12-06 DIAGNOSIS — Z98.890 S/P RIGHT ROTATOR CUFF REPAIR: Primary | ICD-10-CM

## 2022-12-06 DIAGNOSIS — M25.611 DECREASED ROM OF RIGHT SHOULDER: ICD-10-CM

## 2022-12-06 DIAGNOSIS — Z78.9 DIFFICULTY PERFORMING PERSONAL GROOMING ACTIVITY: ICD-10-CM

## 2022-12-06 PROCEDURE — 97140 MANUAL THERAPY 1/> REGIONS: CPT | Performed by: PHYSICAL THERAPIST

## 2022-12-06 PROCEDURE — 97110 THERAPEUTIC EXERCISES: CPT | Performed by: PHYSICAL THERAPIST

## 2022-12-06 PROCEDURE — 97014 ELECTRIC STIMULATION THERAPY: CPT | Performed by: PHYSICAL THERAPIST

## 2022-12-06 NOTE — PROGRESS NOTES
Physical Therapy Daily Treatment Note      Patient: Pj Steele   : 1965  Referring practitioner: CARISSA Mota  Date of Initial Visit: Type: THERAPY  Noted: 2022  Today's Date: 2022  Patient seen for 7 sessions         Pj Steele reports: sore from new exercises 0.5/10 pain today in R shoulder.         Objective     AAROM: 148 degrees flexion; supine with cane    See Exercise, Manual, and Modality Logs for complete treatment.       Assessment/Plan  Pt using LUE to support RUE with wall slides to decrease pain with activity but improving ROM. Continue to progress PROM (limited by pt's pain/tolerance, empty end-feel) and progress exercises per protocol. Ice and e-stim post exercises for pain control.        Progress per Plan of Care and Progress strengthening /stabilization /functional activity           Timed:  Manual Therapy:    15     mins  78219;  Therapeutic Exercise:    15     mins  72367;     Neuromuscular Renita:    -    mins  22741;    Therapeutic Activity:     -     mins  00185;     Gait Training:      -     mins  89455;     Ultrasound:     -     mins  08492;      Untimed:  Electrical Stimulation:    15     mins  99428 ( );  Mechanical Traction:    -     mins  13091;   Dry needling:      -     mins  23974/    Timed Treatment:   30   mins   Total Treatment:     50   mins  Celine Rios PT  Physical Therapist    License #: [id number]

## 2022-12-08 ENCOUNTER — TREATMENT (OUTPATIENT)
Dept: PHYSICAL THERAPY | Facility: CLINIC | Age: 57
End: 2022-12-08

## 2022-12-08 DIAGNOSIS — M25.611 DECREASED ROM OF RIGHT SHOULDER: ICD-10-CM

## 2022-12-08 DIAGNOSIS — Z78.9 DIFFICULTY PERFORMING PERSONAL GROOMING ACTIVITY: ICD-10-CM

## 2022-12-08 DIAGNOSIS — Z98.890 S/P RIGHT ROTATOR CUFF REPAIR: Primary | ICD-10-CM

## 2022-12-08 DIAGNOSIS — R29.898 SHOULDER WEAKNESS: ICD-10-CM

## 2022-12-08 PROCEDURE — 97014 ELECTRIC STIMULATION THERAPY: CPT | Performed by: PHYSICAL THERAPIST

## 2022-12-08 PROCEDURE — 97110 THERAPEUTIC EXERCISES: CPT | Performed by: PHYSICAL THERAPIST

## 2022-12-08 PROCEDURE — 97140 MANUAL THERAPY 1/> REGIONS: CPT | Performed by: PHYSICAL THERAPIST

## 2022-12-08 NOTE — PROGRESS NOTES
Physical Therapy Daily Treatment Note      Patient: Pj Steele   : 1965  Referring practitioner: CARISSA Mota  Date of Initial Visit: Type: THERAPY  Noted: 2022  Today's Date: 2022  Patient seen for 8 sessions         Pj Steele reports: no shoulder pain unless sleeping; sidelying position uncomfortable despite using pillow for position.        Objective   See Exercise, Manual, and Modality Logs for complete treatment.       Assessment/Plan  Pt with improving PROM of R shoulder; flexion and ABD WNL, pain at end range, improving ER/IR. Added resistance band to scapular squeezes with rows and resisted tricep ext; no shoulder ext or bicep curls to continue to avoid loading bicep per protocol for 6 weeks. Added gentle PROM into IR today since he is over 4 weeks post op. Pt instructed and PT demonstrated scar tissue massage to prevent adhesions. Pt issued TB and pictures/instructions of new exercises.        Progress per Plan of Care and Progress strengthening /stabilization /functional activity           Timed:  Manual Therapy:    12     mins  64562;  Therapeutic Exercise:    20     mins  93712;     Neuromuscular Renita:    -    mins  18062;    Therapeutic Activity:     -     mins  97663;     Gait Training:      -     mins  40434;     Ultrasound:     -     mins  71077;      Untimed:  Electrical Stimulation:    15     mins  54150 ( );  Mechanical Traction:    -     mins  07757;   Dry needling:      -     mins  46919/    Timed Treatment:   32   mins   Total Treatment:     50   mins  Celine Rios PT  Physical Therapist    License #: [id number]

## 2022-12-13 ENCOUNTER — TREATMENT (OUTPATIENT)
Dept: PHYSICAL THERAPY | Facility: CLINIC | Age: 57
End: 2022-12-13

## 2022-12-13 DIAGNOSIS — Z98.890 S/P RIGHT ROTATOR CUFF REPAIR: Primary | ICD-10-CM

## 2022-12-13 DIAGNOSIS — Z78.9 DIFFICULTY PERFORMING PERSONAL GROOMING ACTIVITY: ICD-10-CM

## 2022-12-13 DIAGNOSIS — M25.611 DECREASED ROM OF RIGHT SHOULDER: ICD-10-CM

## 2022-12-13 DIAGNOSIS — R29.898 SHOULDER WEAKNESS: ICD-10-CM

## 2022-12-13 PROCEDURE — 97530 THERAPEUTIC ACTIVITIES: CPT | Performed by: PHYSICAL THERAPIST

## 2022-12-13 PROCEDURE — 97014 ELECTRIC STIMULATION THERAPY: CPT | Performed by: PHYSICAL THERAPIST

## 2022-12-13 PROCEDURE — 97110 THERAPEUTIC EXERCISES: CPT | Performed by: PHYSICAL THERAPIST

## 2022-12-13 PROCEDURE — 97140 MANUAL THERAPY 1/> REGIONS: CPT | Performed by: PHYSICAL THERAPIST

## 2022-12-13 NOTE — PROGRESS NOTES
Re-Assessment for 30 Day Progress Note      Patient: Pj Steele   : 1965  Diagnosis/ICD-10 Code:  S/P right rotator cuff repair [Z98.890]  Referring practitioner: CARISSA Mota  Date of Initial Visit: Type: THERAPY  Noted: 2022  Today's Date: 2022  Patient seen for 9 sessions      Subjective:   Pj Steele reports: he is feeling pretty good; more soreness down into shoulder blade vs at shoulder today.  Subjective Questionnaire: QuickDASH: 27.27  Clinical Progress: improved  Home Program Compliance: Yes  Treatment has included: therapeutic exercise, neuromuscular re-education, manual therapy, therapeutic activity, electrical stimulation, ultrasound and cryotherapy    Objective     Observations      Right Shoulder  Negative for effusion.    Additional Shoulder Observation Details  No s/s of infection     Palpation      Right   Hypertonic in the biceps, levator scapulae, pectoralis major, pectoralis minor, rhomboids, thoracic paraspinals  Tenderness of the biceps, supraspinatus, rhomboids, thoracic paraspinals     Tenderness      Right Shoulder  Tenderness in the AC joint, acromion, biceps tendon (proximal), coracoid process, supraspinatus tendon.     Cervical/Thoracic Screen   Cervical range of motion within normal limits; pain-free     Neurological Testing      Sensation      No numbness/tingling R hand     Active Range of Motion     Additional Active Range of Motion Details  Defer due to surgery/protocol     Passive Range of Motion      Right Shoulder   Flexion: 155 degrees    Scaption: 165 degrees  External rotation 0°: 78 degrees  Internal rotation 45°: 52 degrees with shoulder anterior translation and no OP    AAROM    Right shoulder  Flexion: 145 degrees with cane  ABD: 165 degrees with cane  ER: 70 degrees with cane     Strength/Myotome Testing     Additional Strength Details  Deferred due to surgery/protocol       Assessment & Plan     Assessment  Impairments: abnormal muscle  firing, abnormal muscle tone, abnormal or restricted ROM, activity intolerance, impaired physical strength, lacks appropriate home exercise program, pain with function and weight-bearing intolerance  Functional Limitations: carrying objects, lifting, sleeping, pulling, pushing, uncomfortable because of pain, moving in bed, reaching behind back, reaching overhead and unable to perform repetitive tasks  Assessment details: Pj Steele is a 57 y.o. year-old male referred to physical therapy for R RTC and open bicep tenodesis repair (11/4/22). Pt is 5 weeks s/p R shoulder surgery and following [facility] protocol with bicep tenodesis. Not loading bicep until 6 weeks but has full ROM at elbow. Progressing PROM of R shoulder along with initiation of AAROM at 4 weeks marin. Pt has weaned out of sling at this time and plan to return to work January 3rd; advise to continue lifting restriction until seen by surgeon. Patient is appropriate for skilled physical therapy in order to reduce pain and increase ease with daily mobility. During therapy, pt educated on anatomy, goal of interventions, positioning, lifting restriction and body mechanics to promote healthy lifestyle and improve quality of life.  Prognosis: good    Goals  Plan Goals: STG: ~8 weeks  1. Pt will demonstrate R shoulder PROM in flexion and abduction of >140 degrees to improve overhead activity-MET  2. Pt will demonstrate R shoulder PROM in ER to 30 degrees-MET  3. Decrease right UE tenderness with palpation to minimal over the acromion and RTC tendon to be able to sleep on his side-MET  4. Pt will be able to progress to AROM of R shoulder without compensation of R UT and appropriate scapulo-humeral motion.-NOT MET per protocol  5. Pt will improve QuickDASH score to 40 or less to improve subjective function of shoulder with ADLs-MET    LTG: ~16 weeks  1. Pt will demonstrate R shoulder AROM WNL and minimal pain to be able to reach in multiple  planes/directions-NOT MET  2. Pt will be able to reach lumbar spine using RUE-NOT MET  3. Pt will improve R shoulder strength to 5/5 to improve quality of life-NOT MET  4. Pt will improve QuickDASH score to 10 or less to improve subjective function of shoulder with ADLs-NOT MET  5. Pt will be able to return to work with full function of R shoulder-NOT MET      Plan  Therapy options: will be seen for skilled therapy services  Planned modality interventions: cryotherapy, electrical stimulation/Russian stimulation, iontophoresis, TENS, thermotherapy (hydrocollator packs), ultrasound and dry needling  Planned therapy interventions: ADL retraining, balance/weight-bearing training, body mechanics training, fine motor coordination training, flexibility, functional ROM exercises, home exercise program, IADL retraining, joint mobilization, manual therapy, neuromuscular re-education, postural training, soft tissue mobilization, spinal/joint mobilization, strengthening, stretching and therapeutic activities  Treatment plan discussed with: patient  Plan details: 2 times per week for 8 weeks        Visit Diagnoses:    ICD-10-CM ICD-9-CM   1. S/P right rotator cuff repair  Z98.890 V45.89   2. Decreased ROM of right shoulder  M25.611 719.51   3. Shoulder weakness  R29.898 719.61   4. Difficulty performing personal grooming activity  Z78.9 V15.81       PT Signature: GUEVARA Saenz license: [id number]      Timed:  Manual Therapy:    15     mins  57181;  Therapeutic Exercise:    26     mins  52514;     Neuromuscular Renita:    -    mins  62347;    Therapeutic Activity:     10     mins  02151;     Gait Training:      -     mins  35414;     Ultrasound:     -     mins  04489;    Electrical Stimulation:    -     mins  76254 ( );    Untimed:  Electrical Stimulation:    15     mins  97721 ( );  Mechanical Traction:    -     mins  77980; \  Dry needling:      -     mins  20560/20561    Timed Treatment:   51   mins   Total  Treatment:     70   mins

## 2022-12-14 ENCOUNTER — OFFICE VISIT (OUTPATIENT)
Dept: FAMILY MEDICINE CLINIC | Facility: CLINIC | Age: 57
End: 2022-12-14

## 2022-12-14 VITALS
OXYGEN SATURATION: 99 % | WEIGHT: 204 LBS | HEIGHT: 74 IN | TEMPERATURE: 98.6 F | HEART RATE: 85 BPM | SYSTOLIC BLOOD PRESSURE: 120 MMHG | BODY MASS INDEX: 26.18 KG/M2 | RESPIRATION RATE: 15 BRPM | DIASTOLIC BLOOD PRESSURE: 80 MMHG

## 2022-12-14 DIAGNOSIS — D72.829 LEUKOCYTOSIS, UNSPECIFIED TYPE: ICD-10-CM

## 2022-12-14 DIAGNOSIS — I10 ESSENTIAL HYPERTENSION: ICD-10-CM

## 2022-12-14 DIAGNOSIS — E78.2 MIXED HYPERLIPIDEMIA: Primary | ICD-10-CM

## 2022-12-14 PROCEDURE — 99213 OFFICE O/P EST LOW 20 MIN: CPT | Performed by: PHYSICIAN ASSISTANT

## 2022-12-14 NOTE — PROGRESS NOTES
"Chief Complaint  Hypertension (management) and Hyperlipidemia (management)    Subjective          Pj Steele presents to [facility]  History of Present Illness  Pj is a 57 year old female who presents hypertension and hyperlipidemia management.  He has gained 6 pounds since last office visit in November 2022.  States his diet has not been as well over the holidays.  He has also given up eating his oatmeal for breakfast.  Has not been as physically active due to recent shoulder surgery.  Pj has been compliant with medication.  Denied any current chest pain, shortness of air, cough, wheezing or swelling of ankles.  Bowel movements have been normal for him without dark black tarry stools.  Sleep is improving since surgery.  Pj states he was feeling slightly ill around time of blood work.  States he is feeling much better now.  Denied any upper respiratory symptoms, fevers or chills.  Review of Systems   Constitutional: Negative.    HENT: Negative.    Respiratory: Negative.  Negative for shortness of breath.    Cardiovascular: Negative.  Negative for chest pain and palpitations.   Gastrointestinal: Negative.    Musculoskeletal: Negative for neck pain.   Neurological: Negative.    Psychiatric/Behavioral: Negative.    All other systems reviewed and are negative.    Objective   Vital Signs:   /80 (BP Location: Left arm, Patient Position: Sitting, Cuff Size: Adult)   Pulse 85   Temp 98.6 °F (37 °C)   Resp 15   Ht 188 cm (74\")   Wt 92.5 kg (204 lb)   SpO2 99%   BMI 26.19 kg/m²     Physical Exam  Vitals and nursing note reviewed.   Constitutional:       Appearance: Normal appearance. He is well-developed, well-groomed and overweight.      Interventions: Face mask in place.   HENT:      Head: Normocephalic and atraumatic.   Neck:      Thyroid: No thyroid mass, thyromegaly or thyroid tenderness.      Vascular: No carotid bruit.      Trachea: Trachea and phonation normal. " No tracheal tenderness.   Cardiovascular:      Rate and Rhythm: Normal rate and regular rhythm.      Pulses: Normal pulses.      Heart sounds: Normal heart sounds, S1 normal and S2 normal. No murmur heard.  Pulmonary:      Effort: Pulmonary effort is normal.      Breath sounds: Normal breath sounds and air entry.   Abdominal:      General: Bowel sounds are normal.      Palpations: Abdomen is soft. There is no hepatomegaly.      Tenderness: There is no abdominal tenderness. There is no right CVA tenderness, left CVA tenderness, guarding or rebound. Negative signs include Iraheta's sign, Rovsing's sign, McBurney's sign, psoas sign and obturator sign.   Musculoskeletal:      Cervical back: Neck supple.      Right lower leg: No edema.      Left lower leg: No edema.   Skin:     General: Skin is warm and dry.      Capillary Refill: Capillary refill takes less than 2 seconds.   Neurological:      Mental Status: He is alert and oriented to person, place, and time.   Psychiatric:         Attention and Perception: Attention and perception normal.         Mood and Affect: Mood and affect normal.         Speech: Speech normal.         Behavior: Behavior normal. Behavior is cooperative.         Thought Content: Thought content normal.         Cognition and Memory: Cognition and memory normal.         Judgment: Judgment normal.     I wore a facial mask during this patient encounter.  Patient also wearing a surgical mask.  Hand hygiene performed before and after seeing the patient.     Result Review :     CMP    CMP 5/26/22 10/28/22 12/2/22   Glucose 70 88 99   BUN 14 17 11   Creatinine 1.01 1.00 0.97   Sodium 137 136 136   Potassium 4.7 4.0 4.2   Chloride 96 (A) 100 97 (A)   Calcium 9.2 8.9 9.7   Albumin 4.90 4.50 5.10   Total Bilirubin 0.5 0.8 0.7   Alkaline Phosphatase 50 49 52   AST (SGOT) 22 18 18   ALT (SGPT) 30 26 28   (A) Abnormal value            CBC w/diff    CBC w/Diff 5/26/22 10/28/22 12/2/22   WBC 7.94 7.23 14.16 (A)    RBC 5.26 5.05 5.16   Hemoglobin 15.1 14.7 15.0   Hematocrit 45.6 43.0 44.8   MCV 86.7 85.1 86.8   MCH 28.7 29.1 29.1   MCHC 33.1 34.2 33.5   RDW 12.3 13.8 13.4   Platelets 303 260 328   Neutrophil Rel % 69.2 70.0 83.3 (A)   Immature Granulocyte Rel % 0.3 0.6 (A) 0.5   Lymphocyte Rel % 16.9 (A) 16.0 (A) 8.3 (A)   Monocyte Rel % 9.6 8.4 7.7   Eosinophil Rel % 3.1 4.3 0.1 (A)   Basophil Rel % 0.9 0.7 0.1   (A) Abnormal value            Lipid Panel    Lipid Panel 2/10/22 10/28/22 12/2/22   Total Cholesterol 276 (A) 222 (A) 284 (A)   Triglycerides 101 183 (A) 174 (A)   HDL Cholesterol 51 43 55   VLDL Cholesterol 18 33 33   LDL Cholesterol  207 (A) 146 (A) 196 (A)   LDL/HDL Ratio 4.02 3.31 3.53   (A) Abnormal value            TSH    TSH 5/26/22   TSH 1.840                         Assessment and Plan    Diagnoses and all orders for this visit:    1. Mixed hyperlipidemia (Primary)  -     Comprehensive Metabolic Panel; Future  -     Lipid Panel With LDL / HDL Ratio; Future    2. Leukocytosis, unspecified type  -     CBC w AUTO Differential    3. Essential hypertension    1.  Chronic and uncontrolled hyperlipidemia: I have reviewed for work with him.  Stressed importance of being more compliant with his diet but decreasing fatty food, carbohydrates, red meat, and sugars.  We will increase this patient to 2 capsules daily.  He cannot take statins due to side effects of muscle aches.  We will consider fenofibrate in the future.  He would like to recheck labs in 3 months before changing medication.  He was given written orders to take to [facility] for CMP and lipid profile to be collected in March 2022.  2.  New leukocytosis: CBC above was elevated.  He was feeling ill around the time of blood work.  We will recheck white count today.  He will be notified of test results.  3.  Chronic and stable hypertension: Have rechecked blood pressure now 120/80 in left arm.  Doing well with his current medication.  No  refills are needed.  Return to office in 6 months.    Follow Up   Return in about 6 months (around 6/14/2023), or HTN chol labs before.  Patient was given instructions and counseling regarding his condition or for health maintenance advice. Please see specific information pulled into the AVS if appropriate.     [facility]  [address] [place] [id number]  Dept: [phone]  Dept Fax: [phone]  Loc: [phone]  Loc Fax: [phone]        Answers for HPI/ROS submitted by the patient on 12/7/2022  What is the primary reason for your visit?: High Blood Pressure

## 2022-12-15 ENCOUNTER — TREATMENT (OUTPATIENT)
Dept: PHYSICAL THERAPY | Facility: CLINIC | Age: 57
End: 2022-12-15

## 2022-12-15 DIAGNOSIS — Z98.890 S/P RIGHT ROTATOR CUFF REPAIR: Primary | ICD-10-CM

## 2022-12-15 DIAGNOSIS — Z78.9 DIFFICULTY PERFORMING PERSONAL GROOMING ACTIVITY: ICD-10-CM

## 2022-12-15 DIAGNOSIS — R29.898 SHOULDER WEAKNESS: ICD-10-CM

## 2022-12-15 DIAGNOSIS — M25.611 DECREASED ROM OF RIGHT SHOULDER: ICD-10-CM

## 2022-12-15 LAB
BASOPHILS # BLD AUTO: 0.07 10*3/MM3 (ref 0–0.2)
BASOPHILS NFR BLD AUTO: 0.6 % (ref 0–1.5)
EOSINOPHIL # BLD AUTO: 0.17 10*3/MM3 (ref 0–0.4)
EOSINOPHIL NFR BLD AUTO: 1.5 % (ref 0.3–6.2)
ERYTHROCYTE [DISTWIDTH] IN BLOOD BY AUTOMATED COUNT: 13.4 % (ref 12.3–15.4)
HCT VFR BLD AUTO: 41.8 % (ref 37.5–51)
HGB BLD-MCNC: 13.9 G/DL (ref 13–17.7)
IMM GRANULOCYTES # BLD AUTO: 0.12 10*3/MM3 (ref 0–0.05)
IMM GRANULOCYTES NFR BLD AUTO: 1 % (ref 0–0.5)
LYMPHOCYTES # BLD AUTO: 1.69 10*3/MM3 (ref 0.7–3.1)
LYMPHOCYTES NFR BLD AUTO: 14.7 % (ref 19.6–45.3)
MCH RBC QN AUTO: 28.8 PG (ref 26.6–33)
MCHC RBC AUTO-ENTMCNC: 33.3 G/DL (ref 31.5–35.7)
MCV RBC AUTO: 86.7 FL (ref 79–97)
MONOCYTES # BLD AUTO: 0.86 10*3/MM3 (ref 0.1–0.9)
MONOCYTES NFR BLD AUTO: 7.5 % (ref 5–12)
NEUTROPHILS # BLD AUTO: 8.57 10*3/MM3 (ref 1.7–7)
NEUTROPHILS NFR BLD AUTO: 74.7 % (ref 42.7–76)
NRBC BLD AUTO-RTO: 0 /100 WBC (ref 0–0.2)
PLATELET # BLD AUTO: 297 10*3/MM3 (ref 140–450)
RBC # BLD AUTO: 4.82 10*6/MM3 (ref 4.14–5.8)
WBC # BLD AUTO: 11.48 10*3/MM3 (ref 3.4–10.8)

## 2022-12-15 PROCEDURE — 97530 THERAPEUTIC ACTIVITIES: CPT | Performed by: PHYSICAL THERAPIST

## 2022-12-15 PROCEDURE — 97110 THERAPEUTIC EXERCISES: CPT | Performed by: PHYSICAL THERAPIST

## 2022-12-15 PROCEDURE — 97140 MANUAL THERAPY 1/> REGIONS: CPT | Performed by: PHYSICAL THERAPIST

## 2022-12-15 PROCEDURE — 97014 ELECTRIC STIMULATION THERAPY: CPT | Performed by: PHYSICAL THERAPIST

## 2022-12-15 NOTE — PROGRESS NOTES
"   Physical Therapy Daily Treatment Note      Patient: Pj Steele   : 1965  Referring practitioner: CARISSA Mota  Date of Initial Visit: Type: THERAPY  Noted: 2022  Today's Date: 12/15/2022  Patient seen for 10 sessions         Pj Steele reports: he is feeling it today \" more annoying today than painful\"         Objective   See Exercise, Manual, and Modality Logs for complete treatment.       Assessment/Plan  Pt is 6 weeks post op and per protocol started some AROM of R shoulder today; most pain and limitation with ABD (kept shoulder raises below 90 degrees) and ER. PROM WNL with mild pain/tightness at end ranges. Pt instructed to keep active motions within pain-free range. Updated HEP handout and printed for compliance.        Progress per Plan of Care and Progress strengthening /stabilization /functional activity         Timed:  Manual Therapy:    12     mins  14914;  Therapeutic Exercise:    20     mins  06056;     Neuromuscular Renita:    -    mins  13417;    Therapeutic Activity:     10     mins  98088;     Gait Training:      -     mins  93116;     Ultrasound:     -     mins  27038;      Untimed:  Electrical Stimulation:    15     mins  84295 ( );  Mechanical Traction:    -     mins  59972;   Dry needling:      -     mins  57201/    Timed Treatment:   42   mins   Total Treatment:     60   mins    Celine Rios PT  Physical Therapist    License #: [id number]                "

## 2022-12-16 DIAGNOSIS — I10 ESSENTIAL HYPERTENSION: ICD-10-CM

## 2022-12-16 DIAGNOSIS — F41.1 GAD (GENERALIZED ANXIETY DISORDER): ICD-10-CM

## 2022-12-16 RX ORDER — MELOXICAM 15 MG/1
TABLET ORAL
Qty: 30 TABLET | Refills: 0 | Status: SHIPPED | OUTPATIENT
Start: 2022-12-16 | End: 2023-01-16 | Stop reason: SDUPTHER

## 2022-12-16 RX ORDER — FLUOXETINE HYDROCHLORIDE 40 MG/1
CAPSULE ORAL
Qty: 90 CAPSULE | Refills: 2 | Status: SHIPPED | OUTPATIENT
Start: 2022-12-16

## 2022-12-16 RX ORDER — LOSARTAN POTASSIUM 100 MG/1
TABLET ORAL
Qty: 90 TABLET | Refills: 2 | Status: SHIPPED | OUTPATIENT
Start: 2022-12-16

## 2022-12-16 NOTE — TELEPHONE ENCOUNTER
Rx Refill Note  Requested Prescriptions     Pending Prescriptions Disp Refills    meloxicam (MOBIC) 15 MG tablet [Pharmacy Med Name: MELOXICAM 15 MG TABLET] 30 tablet 0     Sig: TAKE ONE TABLET BY MOUTH DAILY      Last office visit with prescribing clinician: 12/1/2022      Next office visit with prescribing clinician: 1/11/2023   Last Filled:11.17.2022    DX:  s/p right shoulder rotator cuff repair and biceps tenodesis  DOS 11/4/2022    Diane Ann MA  12/16/22, 09:23 EST    {TIP  Encounters:    {TIP  Please add Last Relevant Lab Date if appropriate:  {TIP  Is Refill Pharmacy correct?:

## 2022-12-20 ENCOUNTER — TELEPHONE (OUTPATIENT)
Dept: ORTHOPEDIC SURGERY | Facility: CLINIC | Age: 57
End: 2022-12-20

## 2022-12-20 ENCOUNTER — TREATMENT (OUTPATIENT)
Dept: PHYSICAL THERAPY | Facility: CLINIC | Age: 57
End: 2022-12-20

## 2022-12-20 DIAGNOSIS — R29.898 SHOULDER WEAKNESS: ICD-10-CM

## 2022-12-20 DIAGNOSIS — Z98.890 S/P RIGHT ROTATOR CUFF REPAIR: Primary | ICD-10-CM

## 2022-12-20 DIAGNOSIS — M25.611 DECREASED ROM OF RIGHT SHOULDER: ICD-10-CM

## 2022-12-20 DIAGNOSIS — Z78.9 DIFFICULTY PERFORMING PERSONAL GROOMING ACTIVITY: ICD-10-CM

## 2022-12-20 PROCEDURE — 97140 MANUAL THERAPY 1/> REGIONS: CPT | Performed by: PHYSICAL THERAPIST

## 2022-12-20 PROCEDURE — 97110 THERAPEUTIC EXERCISES: CPT | Performed by: PHYSICAL THERAPIST

## 2022-12-20 PROCEDURE — 97014 ELECTRIC STIMULATION THERAPY: CPT | Performed by: PHYSICAL THERAPIST

## 2022-12-20 NOTE — TELEPHONE ENCOUNTER
----- Message from Parish Yin MD sent at 12/20/2022  4:24 PM EST -----  Thanks for checking in Celine-if he is still lacking strength I would recommend he extend his FMLA until he sees me back on January 11.  If he cannot get any more FMLA tell him to call my office and we can give him a return to work note with modifications  ----- Message -----  From: Celine Martínez, PT  Sent: 12/20/2022   9:30 AM EST  To: Neva Sultana, PT, Parish Yin MD    Hi Dr. Yin,    I am the PT seeing Pj s/p RTC repair-RUE. His FMLA expires 12/29 and is supposed to return to work 1/3/23. I was wondering if needed to be cleared before returning to work. He says he can modify his work duties to a certain extent but just wanted to see what you thought. He is a . Supposed to see you all on 1/11/23. His ROM is a lot better but still lacking full strength. He is wondering if he should extend his FMLA. Let me know your thoughts.    Thanks  Celine Trejo  PT [facility]

## 2022-12-20 NOTE — PROGRESS NOTES
Physical Therapy Daily Treatment Note      Patient: Pj Steele   : 1965  Referring practitioner: CARISSA Mota  Date of Initial Visit: Type: THERAPY  Noted: 2022  Today's Date: 2022  Patient seen for 11 sessions         Pj Steele reports: yesterday was more painful day than normal; pain with even washing my hands but no pain after PT session last week with new exercises. Pt is a  and can modify or use his L arm for most ax.       Objective     R shoulder PROM  Flexion: 160 degrees; mild pain at end range  ABD: 170 degrees; mild pain at end range  ER at 90 degrees ABD: 78 degrees  IR at 45 degrees ABD: 65 degrees    R shoulder AROM  Flexion: 154 degrees  ABD: 158 degrees  ER at 0 degrees: 30 degrees    R shoulder strength  Flexion: 4-  ABD: 3+  ER: 3-    See Exercise, Manual, and Modality Logs for complete treatment.       Assessment/Plan  Pt is 6 weeks + 4 days s/p R RTC repair; concerned about return to work on 1/3/23. PT messaged Dr. Yin to see if he is released to return to work although next follow up not until 23. Continued to progress strength per protocol; most weakness with ER. Added some gentle rhythmic stab with manual tx to improve joint stability and strength.        Progress per Plan of Care and Progress strengthening /stabilization /functional activity           Timed:  Manual Therapy:    15     mins  26635;  Therapeutic Exercise:    30     mins  34469;     Neuromuscular Renita:    -    mins  39648;    Therapeutic Activity:     -     mins  34366;     Gait Training:      -     mins  11303;     Ultrasound:     -     mins  81719;      Untimed:  Electrical Stimulation:    15     mins  33230 ( );  Mechanical Traction:    -     mins  59048;   Dry needling:      -     mins  72974/    Timed Treatment:   45   mins   Total Treatment:     60   mins    Celine Rios PT  Physical Therapist  License #: [id number]

## 2022-12-20 NOTE — TELEPHONE ENCOUNTER
Called and spoke with patient and his spouse- patient provided with a off work note until seen back by Dr. Yin 01.11.2023.    Thanks.

## 2022-12-22 ENCOUNTER — TREATMENT (OUTPATIENT)
Dept: PHYSICAL THERAPY | Facility: CLINIC | Age: 57
End: 2022-12-22

## 2022-12-22 DIAGNOSIS — M25.611 DECREASED ROM OF RIGHT SHOULDER: ICD-10-CM

## 2022-12-22 DIAGNOSIS — Z98.890 S/P RIGHT ROTATOR CUFF REPAIR: Primary | ICD-10-CM

## 2022-12-22 DIAGNOSIS — R29.898 SHOULDER WEAKNESS: ICD-10-CM

## 2022-12-22 DIAGNOSIS — Z78.9 DIFFICULTY PERFORMING PERSONAL GROOMING ACTIVITY: ICD-10-CM

## 2022-12-22 PROCEDURE — 97014 ELECTRIC STIMULATION THERAPY: CPT | Performed by: PHYSICAL THERAPIST

## 2022-12-22 PROCEDURE — 97140 MANUAL THERAPY 1/> REGIONS: CPT | Performed by: PHYSICAL THERAPIST

## 2022-12-22 PROCEDURE — 97110 THERAPEUTIC EXERCISES: CPT | Performed by: PHYSICAL THERAPIST

## 2022-12-22 PROCEDURE — 97035 APP MDLTY 1+ULTRASOUND EA 15: CPT | Performed by: PHYSICAL THERAPIST

## 2022-12-22 NOTE — PROGRESS NOTES
Physical Therapy Daily Treatment Note      Patient: Pj Steele   : 1965  Referring practitioner: CARISSA Mota  Date of Initial Visit: Type: THERAPY  Noted: 2022  Today's Date: 2022  Patient seen for 12 sessions         Pj Steele reports: R shoulder pain waking him up at night; unable to get comfortable. Pt planning on extending FMLA for another few weeks; already has paperwork submitted.        Objective   See Exercise, Manual, and Modality Logs for complete treatment.       Assessment/Plan  Pt with improving PROM; tightness at end range flexion and ABD, most limited in ER ROM and strength with pain and tightness limiting ROM. Pt reports intermittent sharp shooting pains at shoulder joint but not along muscles or bicep. Added US to R AC joint today and used heat with e-stim to see if that will help his pain. Pt planning to extend FMLA; previously supposed to return to work on 1/3/23 but still lacking full strength of R shoulder. Plan to continue to progress strength and control pain.        Progress per Plan of Care and Progress strengthening /stabilization /functional activity           Timed:  Manual Therapy:    15     mins  08339;  Therapeutic Exercise:    25     mins  07294;     Neuromuscular Renita:    -    mins  78059;    Therapeutic Activity:     -     mins  14630;     Gait Training:      -     mins  03086;     Ultrasound:     8     mins  67798;      Untimed:  Electrical Stimulation:    15     mins  10127 ( );  Mechanical Traction:    -     mins  95092;   Dry needling:      -     mins  31395/    Timed Treatment:   48   mins   Total Treatment:     70   mins    Celine Rios PT  Physical Therapist    License #: [id number]

## 2022-12-27 ENCOUNTER — TREATMENT (OUTPATIENT)
Dept: PHYSICAL THERAPY | Facility: CLINIC | Age: 57
End: 2022-12-27

## 2022-12-27 DIAGNOSIS — M25.611 DECREASED ROM OF RIGHT SHOULDER: ICD-10-CM

## 2022-12-27 DIAGNOSIS — Z78.9 DIFFICULTY PERFORMING PERSONAL GROOMING ACTIVITY: ICD-10-CM

## 2022-12-27 DIAGNOSIS — Z98.890 S/P RIGHT ROTATOR CUFF REPAIR: Primary | ICD-10-CM

## 2022-12-27 DIAGNOSIS — R29.898 SHOULDER WEAKNESS: ICD-10-CM

## 2022-12-27 PROCEDURE — 97140 MANUAL THERAPY 1/> REGIONS: CPT | Performed by: PHYSICAL THERAPIST

## 2022-12-27 PROCEDURE — 97110 THERAPEUTIC EXERCISES: CPT | Performed by: PHYSICAL THERAPIST

## 2022-12-27 NOTE — PROGRESS NOTES
Physical Therapy Daily Treatment Note      Patient: Pj Steele   : 1965  Referring practitioner: CARISSA Mota  Date of Initial Visit: Type: THERAPY  Noted: 2022  Today's Date: 2022  Patient seen for 13 sessions         Pj Steele reports: R shoulder pain 0.5/10 today; could not really tell a difference with the ultrasound tx last session. Pt able to sleep on R shoulder for 30-60 min last night then woke up due to pain.           Objective   See Exercise, Manual, and Modality Logs for complete treatment.       Assessment/Plan  Continued strengthening of RTC with PROM WNL; most limitation with ER and most pain with ABD. Plan to continue to progress strength. Next follow up with surgeon 23. Able to tolerate 1lb weight with shoulder raises in flexion. Pt educated to keep exercises with tolerable pain; avoid any sharp pain. Pt issued supine PNF to add to HEP. Pt reports he will apply ice at home.         Progress per Plan of Care and Progress strengthening /stabilization /functional activity           Timed:  Manual Therapy:    15     mins  58921;  Therapeutic Exercise:    30     mins  64486;     Neuromuscular Renita:    -    mins  83969;    Therapeutic Activity:     -     mins  76057;     Gait Training:      -     mins  06101;     Ultrasound:     -     mins  87086;      Untimed:  Electrical Stimulation:    -     mins  01875 ( );  Mechanical Traction:    -     mins  76614;   Dry needling:      -     mins  39247/    Timed Treatment:   45   mins   Total Treatment:     45   mins  Celine Rios PT  Physical Therapist    License #: [id number]

## 2022-12-30 ENCOUNTER — TREATMENT (OUTPATIENT)
Dept: PHYSICAL THERAPY | Facility: CLINIC | Age: 57
End: 2022-12-30

## 2022-12-30 ENCOUNTER — TELEPHONE (OUTPATIENT)
Dept: ORTHOPEDIC SURGERY | Facility: CLINIC | Age: 57
End: 2022-12-30

## 2022-12-30 DIAGNOSIS — M25.611 DECREASED ROM OF RIGHT SHOULDER: ICD-10-CM

## 2022-12-30 DIAGNOSIS — R29.898 SHOULDER WEAKNESS: ICD-10-CM

## 2022-12-30 DIAGNOSIS — Z78.9 DIFFICULTY PERFORMING PERSONAL GROOMING ACTIVITY: ICD-10-CM

## 2022-12-30 DIAGNOSIS — Z98.890 S/P RIGHT ROTATOR CUFF REPAIR: Primary | ICD-10-CM

## 2022-12-30 PROCEDURE — 97530 THERAPEUTIC ACTIVITIES: CPT | Performed by: PHYSICAL THERAPIST

## 2022-12-30 PROCEDURE — 97110 THERAPEUTIC EXERCISES: CPT | Performed by: PHYSICAL THERAPIST

## 2022-12-30 PROCEDURE — 97140 MANUAL THERAPY 1/> REGIONS: CPT | Performed by: PHYSICAL THERAPIST

## 2022-12-30 NOTE — TELEPHONE ENCOUNTER
Called patient on both phone numbers listed in the chart-message left on the patient's cell phone number- if he is wishing for additional paperwork be completed and sent out he will have will have to request this with his disability company.    Thanks so much,  Diane

## 2022-12-30 NOTE — PROGRESS NOTES
Physical Therapy Daily Treatment Note      Patient: Pj Steele   : 1965  Referring practitioner: CARISSA Mota  Date of Initial Visit: Type: THERAPY  Noted: 2022  Today's Date: 2022  Patient seen for 14 sessions         Pj Steele reports:  Minimal pain, mostly tightness in shoulder. Pt reports his wife fell in the bathroom today and will need to be home soon.       Objective   See Exercise, Manual, and Modality Logs for complete treatment.       Assessment/Plan  Pt is 8 weeks s/p RTC repair and bicep tenodesis. Pt able to progress strengthening with increased level of resistance bands today in all directions. Added posterior and inferior joint glides/stretches today with improve tolerance of PNF after focus on mobility.  PROM WNL and improving AROM but still painful and tight at end ranges; thus added joint glides today with hope to improve pain with AROM. Updated HEP to add joint glides today. Pt reports pain with sleeping on R shoulder; able to tolerate ~1 hour. Pt has follow up with Dr. Yin 22 and possible release to return to work pending MD orders. Pt to ice at home to be able to check in with his wife.        Progress per Plan of Care and Progress strengthening /stabilization /functional activity           Timed:  Manual Therapy:    15     mins  02492;  Therapeutic Exercise:    25     mins  08407;     Neuromuscular Renita:    -    mins  11446;    Therapeutic Activity:     10     mins  81603;     Gait Training:      -     mins  44615;     Ultrasound:     -     mins  25758;      Untimed:  Electrical Stimulation:    -     mins  95432 ( );  Mechanical Traction:    -     mins  18603;   Dry needling:      -     mins  28204/    Timed Treatment:   50   mins   Total Treatment:     50   mins  Celine Rios PT  Physical Therapist    License #: [id number]

## 2022-12-30 NOTE — TELEPHONE ENCOUNTER
Caller: Pj Steele    Relationship: Self    Best call back number:     What form or medical record are you requesting: EXTENSION OF FMLA - FMLA  22    Who is requesting this form or medical record from you: [employer]    How would you like to receive the form or medical records (pick-up, mail, fax):   If fax, what is the fax number:   PT STATED FAX NUMBER SHOULD BE ON FILE    Timeframe paperwork needed: ASAP

## 2023-01-03 ENCOUNTER — TREATMENT (OUTPATIENT)
Dept: PHYSICAL THERAPY | Facility: CLINIC | Age: 58
End: 2023-01-03
Payer: COMMERCIAL

## 2023-01-03 DIAGNOSIS — Z78.9 DIFFICULTY PERFORMING PERSONAL GROOMING ACTIVITY: ICD-10-CM

## 2023-01-03 DIAGNOSIS — M25.611 DECREASED ROM OF RIGHT SHOULDER: ICD-10-CM

## 2023-01-03 DIAGNOSIS — Z98.890 S/P RIGHT ROTATOR CUFF REPAIR: Primary | ICD-10-CM

## 2023-01-03 DIAGNOSIS — R29.898 SHOULDER WEAKNESS: ICD-10-CM

## 2023-01-03 PROCEDURE — 97140 MANUAL THERAPY 1/> REGIONS: CPT | Performed by: PHYSICAL THERAPIST

## 2023-01-03 PROCEDURE — 97110 THERAPEUTIC EXERCISES: CPT | Performed by: PHYSICAL THERAPIST

## 2023-01-03 PROCEDURE — 97530 THERAPEUTIC ACTIVITIES: CPT | Performed by: PHYSICAL THERAPIST

## 2023-01-05 ENCOUNTER — TREATMENT (OUTPATIENT)
Dept: PHYSICAL THERAPY | Facility: CLINIC | Age: 58
End: 2023-01-05
Payer: COMMERCIAL

## 2023-01-05 DIAGNOSIS — M25.611 DECREASED ROM OF RIGHT SHOULDER: ICD-10-CM

## 2023-01-05 DIAGNOSIS — Z78.9 DIFFICULTY PERFORMING PERSONAL GROOMING ACTIVITY: ICD-10-CM

## 2023-01-05 DIAGNOSIS — R29.898 SHOULDER WEAKNESS: ICD-10-CM

## 2023-01-05 DIAGNOSIS — Z98.890 S/P RIGHT ROTATOR CUFF REPAIR: Primary | ICD-10-CM

## 2023-01-05 PROCEDURE — 97110 THERAPEUTIC EXERCISES: CPT | Performed by: PHYSICAL THERAPIST

## 2023-01-05 PROCEDURE — 97140 MANUAL THERAPY 1/> REGIONS: CPT | Performed by: PHYSICAL THERAPIST

## 2023-01-05 PROCEDURE — 97530 THERAPEUTIC ACTIVITIES: CPT | Performed by: PHYSICAL THERAPIST

## 2023-01-05 NOTE — PROGRESS NOTES
Physical Therapy Daily Treatment Note      Patient: Pj Steele   : 1965  Referring practitioner: No ref. provider found  Date of Initial Visit: Type: THERAPY  Noted: 2022  Today's Date: 2023  Patient seen for 16 sessions         Pj Steele reports: intermittent shooting pain in R shoulder at rest; has to change positions. Pain with lifting blankets when lying on L side with R arm on top in bed.         Objective   See Exercise, Manual, and Modality Logs for complete treatment.       Assessment/Plan  Pt will be 9 weeks s/p RTC repair and bicep tenodesis as of 23 tomorrow. Pt instructed to try towel roll under arm at night to sleep to avoid prolonged shoulder ext due to reports of difficulty getting comfortable at night on his back also added sleeper stretch to improve IR ROM. Noted humeral head anterior translation and scapular anterior tilt with IR; second person holding humeral head during PROM in IR today with more limited joint mobility but decreased compensation. Added sidelying ABD to simulate pulling up blankets in bed; unable to lift 2 lbs but able to complete with 1lb with tolerable symptoms.  Printed copy of new exercises to add to new program. Pt to ice at home.        Progress per Plan of Care and Progress strengthening /stabilization /functional activity           Timed:  Manual Therapy:    15     mins  88266;  Therapeutic Exercise:    28     mins  72491;     Neuromuscular Renita:    -    mins  68325;    Therapeutic Activity:     16     mins  35834;     Gait Training:      -     mins  33669;     Ultrasound:     -     mins  02748;      Untimed:  Electrical Stimulation:    -     mins  50517 ( );  Mechanical Traction:    -     mins  61384;   Dry needling:      -     mins  10576/    Timed Treatment:   59   mins   Total Treatment:     62   mins  Celine Rios PT  Physical Therapist    License #: [id number]

## 2023-01-09 ENCOUNTER — TELEPHONE (OUTPATIENT)
Dept: PHYSICAL THERAPY | Facility: CLINIC | Age: 58
End: 2023-01-09

## 2023-01-10 ENCOUNTER — TREATMENT (OUTPATIENT)
Dept: PHYSICAL THERAPY | Facility: CLINIC | Age: 58
End: 2023-01-10
Payer: COMMERCIAL

## 2023-01-10 DIAGNOSIS — Z98.890 S/P RIGHT ROTATOR CUFF REPAIR: Primary | ICD-10-CM

## 2023-01-10 DIAGNOSIS — Z78.9 DIFFICULTY PERFORMING PERSONAL GROOMING ACTIVITY: ICD-10-CM

## 2023-01-10 DIAGNOSIS — R29.898 SHOULDER WEAKNESS: ICD-10-CM

## 2023-01-10 DIAGNOSIS — M25.611 DECREASED ROM OF RIGHT SHOULDER: ICD-10-CM

## 2023-01-10 PROCEDURE — 97530 THERAPEUTIC ACTIVITIES: CPT | Performed by: PHYSICAL THERAPIST

## 2023-01-10 PROCEDURE — 97140 MANUAL THERAPY 1/> REGIONS: CPT | Performed by: PHYSICAL THERAPIST

## 2023-01-10 PROCEDURE — 97110 THERAPEUTIC EXERCISES: CPT | Performed by: PHYSICAL THERAPIST

## 2023-01-10 NOTE — PROGRESS NOTES
Re-Assessment / Re-Certification      Patient: Pj Steele   : 1965  Diagnosis/ICD-10 Code:  S/P right rotator cuff repair [Z98.890]  Referring practitioner: CARISSA Mota  Date of Initial Visit: Type: THERAPY  Noted: 2022  Today's Date: 1/10/2023  Patient seen for 17 sessions      Subjective:   Pj Steele reports: it still hurts in some positions and with some motions.   Subjective Questionnaire: QuickDASH: 29.55  Clinical Progress: improved  Home Program Compliance: Yes  Treatment has included: therapeutic exercise, neuromuscular re-education, manual therapy, therapeutic activity, electrical stimulation, ultrasound and cryotherapy    Objective     Observations      Right Shoulder  Negative for effusion, positive for incision    Additional Shoulder Observation Details  No s/s of infection     Palpation      Right   Tenderness of the biceps, supraspinatus, infraspinatus     Tenderness      Right Shoulder  Tenderness in the AC joint, acromion, biceps tendon (proximal), coracoid process, supraspinatus tendon.     Cervical/Thoracic Screen   Cervical range of motion within normal limits; pain-free     Neurological Testing      Sensation      No numbness/tingling R hand     Passive Range of Motion      Right Shoulder   Flexion: 155 degrees    Scaption: 165 degrees  External rotation 0°: 62 degrees  Internal rotation 45°: 60 degrees      Active Range of Motion     Right shoulder  Flexion: 145 degrees   ABD: 140 degrees; painful and tight  ER: 50 degrees; tight  BTH: C4  BTB: lumbar     Strength/Myotome Testing     Right shoulder  Flexion: 4+/5  ABD: 4/5, painful  ER: 4-/5 painful  IR: 5/5    Bicep: 4+/5  Tricep: 4+/5    Assessment & Plan     Assessment  Impairments: abnormal muscle firing, abnormal muscle tone, abnormal or restricted ROM, activity intolerance, impaired physical strength, lacks appropriate home exercise program, pain with function and weight-bearing intolerance  Functional  Limitations: carrying objects, lifting, sleeping, pulling, pushing, uncomfortable because of pain, moving in bed, reaching behind back, reaching overhead and unable to perform repetitive tasks  Assessment details: Pj Steele is a 57 y.o. year-old male referred to physical therapy for R RTC and open bicep tenodesis repair (11/4/22). Pt is 9 weeks s/p R shoulder surgery and following [facility] RTC protocol with bicep tenodesis. Progressing PROM of R shoulder with flexion and ABD WNL however still lacking full ER/IR ROM; most weakness with ABD and ER. Pt with quick fatigue of R shoulder and lacking full strength. Pt has follow up with surgeon tomorrow 1/11/23; hoping to be cleared to return to work. Pt with pain at acromion with certain movements and along back of shoulder with other movements. Pt has met 4/5 STGs and 1/5 LTGs. Patient is appropriate for skilled physical therapy in order to reduce pain and increase ease with daily mobility. During therapy, pt educated on anatomy, goal of interventions, positioning, lifting restriction and body mechanics to promote healthy lifestyle and improve quality of life.  Prognosis: good    Goals  Plan Goals: STG: ~8 weeks  1. Pt will demonstrate R shoulder PROM in flexion and abduction of >140 degrees to improve overhead activity-MET  2. Pt will demonstrate R shoulder PROM in ER to 30 degrees-MET  3. Decrease right UE tenderness with palpation to minimal over the acromion and RTC tendon to be able to sleep on his side-PROGRESSING  4. Pt will be able to progress to AROM of R shoulder without compensation of R UT and appropriate scapulo-humeral motion.-MET  5. Pt will improve QuickDASH score to 40 or less to improve subjective function of shoulder with ADLs-MET    LTG: ~16 weeks  1. Pt will demonstrate R shoulder AROM WNL and minimal pain to be able to reach in multiple planes/directions-PROGRESSING  2. Pt will be able to reach lumbar spine using RUE-MET  3. Pt will improve R  shoulder strength to 5/5 to improve quality of life-NOT MET  4. Pt will improve QuickDASH score to 10 or less to improve subjective function of shoulder with ADLs-NOT MET  5. Pt will be able to return to work with full function of R shoulder-NOT MET      Plan  Therapy options: will be seen for skilled therapy services  Planned modality interventions: cryotherapy, electrical stimulation/Russian stimulation, iontophoresis, TENS, thermotherapy (hydrocollator packs), ultrasound and dry needling  Planned therapy interventions: ADL retraining, balance/weight-bearing training, body mechanics training, fine motor coordination training, flexibility, functional ROM exercises, home exercise program, IADL retraining, joint mobilization, manual therapy, neuromuscular re-education, postural training, soft tissue mobilization, spinal/joint mobilization, strengthening, stretching and therapeutic activities  Treatment plan discussed with: patient  Plan details: 2 times per week for 6 weeks        Visit Diagnoses:    ICD-10-CM ICD-9-CM   1. S/P right rotator cuff repair  Z98.890 V45.89   2. Decreased ROM of right shoulder  M25.611 719.51   3. Shoulder weakness  R29.898 719.61   4. Difficulty performing personal grooming activity  Z78.9 V15.81         PT Signature: Celine Rios PT  [place] license: [id number]      Based upon review of the patient's progress and continued therapy plan, it is my medical opinion that Pj Steele should continue physical therapy treatment at [facility]  [address] [place] [id number] [phone].    Signature: __________________________________  Neva Hua APRN    Timed:  Manual Therapy:    10     mins  15087;  Therapeutic Exercise:    30     mins  66572;     Neuromuscular Renita:    -    mins  66394;    Therapeutic Activity:     15     mins  44372;     Gait Training:      -     mins  07028;     Ultrasound:     -     mins  00350;    Electrical  Stimulation:    -     mins  90063 ( );    Untimed:  Electrical Stimulation:    -     mins  97014 ( );  Mechanical Traction:    -     mins  97012; \  Dry needling:      -     mins  20560/20561    Timed Treatment:   55   mins   Total Treatment:     70   mins

## 2023-01-11 ENCOUNTER — OFFICE VISIT (OUTPATIENT)
Dept: ORTHOPEDIC SURGERY | Facility: CLINIC | Age: 58
End: 2023-01-11
Payer: COMMERCIAL

## 2023-01-11 VITALS — BODY MASS INDEX: 26.18 KG/M2 | HEIGHT: 74 IN | WEIGHT: 204 LBS

## 2023-01-11 DIAGNOSIS — Z98.890 STATUS POST ARTHROSCOPY OF RIGHT SHOULDER: Primary | ICD-10-CM

## 2023-01-11 PROCEDURE — 99024 POSTOP FOLLOW-UP VISIT: CPT | Performed by: ORTHOPAEDIC SURGERY

## 2023-01-11 RX ORDER — PREDNISONE 10 MG/1
TABLET ORAL
Qty: 39 TABLET | Refills: 0 | Status: SHIPPED | OUTPATIENT
Start: 2023-01-11

## 2023-01-11 RX ORDER — CYCLOBENZAPRINE HCL 5 MG
5 TABLET ORAL NIGHTLY PRN
Qty: 30 TABLET | Refills: 0 | Status: SHIPPED | OUTPATIENT
Start: 2023-01-11

## 2023-01-11 NOTE — PROGRESS NOTES
CC: Follow-up status post right shoulder rotator cuff repair and biceps tenodesis-11/4/2022    Interval history: Patient returns to clinic today for  evaluation of status post right reverse total shoulder arthroplasty. The patient is accompanied by an adult female who contributes in his medical history.    The patient is doing well overall, and notes improvement in his symptoms. He is still experiencing intermittent sharp pains to the lateral aspect of his right shoulder, most notably with external rotation activities. He is only having to take pain medication when he is going to physical therapy. The patient denies any associated new numbness or tingling, fevers, chills, or sweats. No redness or warmth over his incision. He has noted moderate improvement with use of Flexeril as well as use of heat.    Exam:  Right shoulder-  FF- Active- 165  Passive- 180  Strength- 4/5  ER- Active- 45  Strength- 4/5  IR Strength- 4+/5  Positive deltoid firing in all three components  Moderately positive subacromial crepitus  Empty can test- Mildly positive  Drop arm test- Negative  Ext rotation lag sign- Negative  Neer's sign- Positive  Collins- Positive  Brisk cap refill to all digits, palpable 2+ radial pulse  Positive sensation to light touch palmar, dorsal aspects of small and index fingers and anatomic snuffbox right hand, symmetric to the left     REHAB:  Will place patient on [facility] rotator cuff repair protocol, sling x4 weeks for biceps tenodesis but needs to start into physical therapy at week 2 utilizing [facility] protocol    Impression: Status post right shoulder rotator cuff repair and biceps tenodesis    Plan:  1. Discussed treatment options at length with patient at today's visit.  2. He is going to start utilizing heat. He also will utilize massage.  3. He is going to continue with physical therapy twice a week.  4. We are going to do another round of oral prednisone to try to help some of his inflammation that is  still causing him some issues, but he is making progress at this time.  5. I am going to see him back in 4 weeks for reassessment.  6. Refill given on Flexeril. We will extend his time off work until follow-up visit.  7. If he is still having issues at follow-up, we may consider subacromial injection at that time.    Transcribed from ambient dictation for Parish Yin MD by Kaila Jett.  01/11/23   13:10 EST    Patient or patient representative verbalized consent to the visit recording.  I have personally performed the services described in this document as transcribed by the above individual, and it is both accurate and complete.

## 2023-01-12 DIAGNOSIS — I10 ESSENTIAL HYPERTENSION: ICD-10-CM

## 2023-01-12 RX ORDER — CHLORTHALIDONE 25 MG/1
TABLET ORAL
Qty: 90 TABLET | Refills: 2 | Status: SHIPPED | OUTPATIENT
Start: 2023-01-12

## 2023-01-13 ENCOUNTER — PATIENT MESSAGE (OUTPATIENT)
Dept: ORTHOPEDIC SURGERY | Facility: CLINIC | Age: 58
End: 2023-01-13
Payer: COMMERCIAL

## 2023-01-13 ENCOUNTER — TREATMENT (OUTPATIENT)
Dept: PHYSICAL THERAPY | Facility: CLINIC | Age: 58
End: 2023-01-13
Payer: COMMERCIAL

## 2023-01-13 DIAGNOSIS — Z98.890 S/P RIGHT ROTATOR CUFF REPAIR: Primary | ICD-10-CM

## 2023-01-13 DIAGNOSIS — M25.611 DECREASED ROM OF RIGHT SHOULDER: ICD-10-CM

## 2023-01-13 DIAGNOSIS — Z78.9 DIFFICULTY PERFORMING PERSONAL GROOMING ACTIVITY: ICD-10-CM

## 2023-01-13 DIAGNOSIS — R29.898 SHOULDER WEAKNESS: ICD-10-CM

## 2023-01-13 PROCEDURE — 97140 MANUAL THERAPY 1/> REGIONS: CPT | Performed by: PHYSICAL THERAPIST

## 2023-01-13 PROCEDURE — 97110 THERAPEUTIC EXERCISES: CPT | Performed by: PHYSICAL THERAPIST

## 2023-01-13 PROCEDURE — 97530 THERAPEUTIC ACTIVITIES: CPT | Performed by: PHYSICAL THERAPIST

## 2023-01-13 NOTE — PROGRESS NOTES
Physical Therapy Daily Treatment Note      Patient: Pj Steele   : 1965  Referring practitioner: CARISSA Mota  Date of Initial Visit: Type: THERAPY  Noted: 2022  Today's Date: 2023  Patient seen for 18 sessions         Pj Steele reports: good report from Dr. Yin; he started me on a steroid and thinks I should stay off work a little longer to work on my strength and pain         Objective   See Exercise, Manual, and Modality Logs for complete treatment.       Assessment/Plan  Pt with improved pain with exercises today; improved tolerance of PROM of R shoulder likely due to steroid. Most pain with PROM at end range ER and IR; most weakness with ER. R shoulder flexion and ABD ROM WNL; still tightness with ER/IR. Continued to progress strengthening per pt's tolerance with increased reps/resistance. Pt planning on return to work on 23.       Progress per Plan of Care and Progress strengthening /stabilization /functional activity       Timed:  Manual Therapy:    12     mins  79915;  Therapeutic Exercise:    30     mins  41324;     Neuromuscular Renita:    -    mins  14288;    Therapeutic Activity:     12     mins  91245;     Gait Training:      -     mins  02034;     Ultrasound:     -     mins  25109;      Untimed:  Electrical Stimulation:    -     mins  26337 ( );  Mechanical Traction:    -     mins  60620;   Dry needling:      -     mins  21748/    Timed Treatment:   54   mins   Total Treatment:     60   mins  Celine Rios PT  Physical Therapist    License #: [id number]

## 2023-01-16 RX ORDER — MELOXICAM 15 MG/1
15 TABLET ORAL DAILY
Qty: 30 TABLET | Refills: 0 | Status: SHIPPED | OUTPATIENT
Start: 2023-01-16 | End: 2023-02-20

## 2023-01-16 NOTE — TELEPHONE ENCOUNTER
Rx Refill Note  Requested Prescriptions     Pending Prescriptions Disp Refills   • meloxicam (MOBIC) 15 MG tablet 30 tablet 0     Sig: Take 1 tablet by mouth Daily.      Last office visit with prescribing clinician: 11/11/2022      Next office visit with prescribing clinician: Visit date not found   Last Filled:12.16.2022    DX:Status post right shoulder arthroscopic rotator cuff repair with bio inductive implant, biceps tenodesis DOS 11/04/2022      Diane Ann MA  01/16/23, 10:48 EST    Previous RX pended for your approval, change or denial.     {TIP  Encounters:    {TIP  Please add Last Relevant Lab Date if appropriate:  {TIP  Is Refill Pharmacy correct?:

## 2023-01-17 ENCOUNTER — TREATMENT (OUTPATIENT)
Dept: PHYSICAL THERAPY | Facility: CLINIC | Age: 58
End: 2023-01-17
Payer: COMMERCIAL

## 2023-01-17 DIAGNOSIS — R29.898 SHOULDER WEAKNESS: ICD-10-CM

## 2023-01-17 DIAGNOSIS — Z78.9 DIFFICULTY PERFORMING PERSONAL GROOMING ACTIVITY: ICD-10-CM

## 2023-01-17 DIAGNOSIS — Z98.890 S/P RIGHT ROTATOR CUFF REPAIR: Primary | ICD-10-CM

## 2023-01-17 DIAGNOSIS — M25.611 DECREASED ROM OF RIGHT SHOULDER: ICD-10-CM

## 2023-01-17 PROCEDURE — 97530 THERAPEUTIC ACTIVITIES: CPT | Performed by: PHYSICAL THERAPIST

## 2023-01-17 PROCEDURE — 97110 THERAPEUTIC EXERCISES: CPT | Performed by: PHYSICAL THERAPIST

## 2023-01-17 PROCEDURE — 97140 MANUAL THERAPY 1/> REGIONS: CPT | Performed by: PHYSICAL THERAPIST

## 2023-01-17 NOTE — PROGRESS NOTES
Physical Therapy Daily Treatment Note      Patient: Pj Steele   : 1965  Referring practitioner: CARISSA Mota  Date of Initial Visit: Type: THERAPY  Noted: 2022  Today's Date: 2023  Patient seen for 19 sessions         Pj Steele reports: feeling pretty good today; minimal to no pain.       Objective   See Exercise, Manual, and Modality Logs for complete treatment.       Assessment/Plan  Pt continues to progress ROM and strength of R shoulder. Planning to return to work next week. Decreased overall pain since starting steroid. No concerns about return to work other than fatigue. Added throws at rebounder, weights with bicep/tricep strengthening and increased weight for ball ax at the wall. Most weakness with ER.        Progress per Plan of Care and Progress strengthening /stabilization /functional activity           Timed:  Manual Therapy:    15     mins  96989;  Therapeutic Exercise:    25     mins  89694;     Neuromuscular Renita:    -    mins  79413;    Therapeutic Activity:     10     mins  51200;     Gait Training:      -     mins  19503;     Ultrasound:     -     mins  46751;      Untimed:  Electrical Stimulation:    -     mins  86119 ( );  Mechanical Traction:    -     mins  99778;   Dry needling:      -     mins  93155/    Timed Treatment:   50   mins   Total Treatment:     50   mins  Celine Rios PT  Physical Therapist    License #: [id number]

## 2023-01-20 ENCOUNTER — TREATMENT (OUTPATIENT)
Dept: PHYSICAL THERAPY | Facility: CLINIC | Age: 58
End: 2023-01-20
Payer: COMMERCIAL

## 2023-01-20 DIAGNOSIS — Z98.890 S/P RIGHT ROTATOR CUFF REPAIR: Primary | ICD-10-CM

## 2023-01-20 DIAGNOSIS — R29.898 SHOULDER WEAKNESS: ICD-10-CM

## 2023-01-20 DIAGNOSIS — Z78.9 DIFFICULTY PERFORMING PERSONAL GROOMING ACTIVITY: ICD-10-CM

## 2023-01-20 DIAGNOSIS — M25.611 DECREASED ROM OF RIGHT SHOULDER: ICD-10-CM

## 2023-01-20 PROCEDURE — 97140 MANUAL THERAPY 1/> REGIONS: CPT | Performed by: PHYSICAL THERAPIST

## 2023-01-20 PROCEDURE — 97530 THERAPEUTIC ACTIVITIES: CPT | Performed by: PHYSICAL THERAPIST

## 2023-01-20 PROCEDURE — 97110 THERAPEUTIC EXERCISES: CPT | Performed by: PHYSICAL THERAPIST

## 2023-01-20 NOTE — PROGRESS NOTES
"   Physical Therapy Daily Treatment Note      Patient: Pj Steele   : 1965  Referring practitioner: CARISSA Mota  Date of Initial Visit: Type: THERAPY  Noted: 2022  Today's Date: 2023  Patient seen for 20 sessions         Pj Steele reports: he is starting work on Monday; per MD note \"no lifting >5 lbs\" and pt educated on no lifting at shoulder joint but ok at bicep; to keep things close when he lifts. Pt notified felton of lifting restrictions.       Objective   See Exercise, Manual, and Modality Logs for complete treatment.       Assessment/Plan  Pt with minimal cues for exercises today; planning to return to work Monday. AROM and PROM WNL however still lacking full strength. Plan to continue PT to progress strength to WNL.       Progress per Plan of Care and Progress strengthening /stabilization /functional activity           Timed:  Manual Therapy:    15     mins  45823;  Therapeutic Exercise:    25     mins  02749;     Neuromuscular Renita:    -    mins  26234;    Therapeutic Activity:     10     mins  36994;     Gait Training:      -     mins  04193;     Ultrasound:     -     mins  28343;      Untimed:  Electrical Stimulation:    -     mins  23802 ( );  Mechanical Traction:    -     mins  65452;   Dry needling:      -     mins  17062/    Timed Treatment:   50   mins   Total Treatment:     60   mins    Celine Rios PT  Physical Therapist    License #: [id number]                "

## 2023-01-23 ENCOUNTER — TREATMENT (OUTPATIENT)
Dept: PHYSICAL THERAPY | Facility: CLINIC | Age: 58
End: 2023-01-23
Payer: COMMERCIAL

## 2023-01-23 DIAGNOSIS — Z78.9 DIFFICULTY PERFORMING PERSONAL GROOMING ACTIVITY: ICD-10-CM

## 2023-01-23 DIAGNOSIS — M25.611 DECREASED ROM OF RIGHT SHOULDER: ICD-10-CM

## 2023-01-23 DIAGNOSIS — Z98.890 S/P RIGHT ROTATOR CUFF REPAIR: Primary | ICD-10-CM

## 2023-01-23 DIAGNOSIS — R29.898 SHOULDER WEAKNESS: ICD-10-CM

## 2023-01-23 PROCEDURE — 97530 THERAPEUTIC ACTIVITIES: CPT | Performed by: PHYSICAL THERAPIST

## 2023-01-23 PROCEDURE — 97110 THERAPEUTIC EXERCISES: CPT | Performed by: PHYSICAL THERAPIST

## 2023-01-23 PROCEDURE — 97140 MANUAL THERAPY 1/> REGIONS: CPT | Performed by: PHYSICAL THERAPIST

## 2023-01-23 NOTE — PROGRESS NOTES
Physical Therapy Daily Treatment Note      Patient: Pj Steele   : 1965  Referring practitioner: CARISSA Mota  Date of Initial Visit: Type: THERAPY  Noted: 2022  Today's Date: 2023  Patient seen for 21 sessions         Pj Steele reports: no difficulty with return to work today; just a little more stiff          Objective   See Exercise, Manual, and Modality Logs for complete treatment.       Assessment/Plan  PROM and AROM WNL; tightness at end range ER/IR however minimal pain. Continue to progress strength and joint stability per protocol. Next follow up 23; 3 more sessions until return to surgeon and possible discharge pending MD appt.     Next  Table push ups  Quadruped arm circles on low mat  Prone Y's and T's       Progress per Plan of Care and Progress strengthening /stabilization /functional activity           Timed:  Manual Therapy:    15     mins  17906;  Therapeutic Exercise:    30     mins  90674;     Neuromuscular Renita:    -    mins  89806;    Therapeutic Activity:     10     mins  10896;     Gait Training:      -     mins  25670;     Ultrasound:     -     mins  78163;      Untimed:  Electrical Stimulation:    -     mins  32527 ( );  Mechanical Traction:    -     mins  13265;   Dry needling:      -     mins  88200/    Timed Treatment:   55   mins   Total Treatment:     60   mins  Celine Rios PT  Physical Therapist    License #: [id number]

## 2023-01-25 ENCOUNTER — TELEPHONE (OUTPATIENT)
Dept: PHYSICAL THERAPY | Facility: CLINIC | Age: 58
End: 2023-01-25

## 2023-01-25 ENCOUNTER — TELEPHONE (OUTPATIENT)
Dept: ORTHOPEDIC SURGERY | Facility: CLINIC | Age: 58
End: 2023-01-25
Payer: COMMERCIAL

## 2023-01-25 NOTE — TELEPHONE ENCOUNTER
Paperwork received from [employer] dated 01.25.2023- paperwork completed- records attached and routed via [employer] to Compliance 11.    Patient aware.

## 2023-01-25 NOTE — TELEPHONE ENCOUNTER
Called and spoke with patient letting him know the last paperwork we have was filled out on 11.02.2022. Fax number given to patient.    Thanks.

## 2023-01-25 NOTE — TELEPHONE ENCOUNTER
PATIENT STATES HEALING WELL- WANTS TO KNOW CAN HE GO DOWN TO COMING IN ONCE A WEEK? PLEASE CALL PATIENT TO ADVISE. [phone].

## 2023-02-03 ENCOUNTER — TREATMENT (OUTPATIENT)
Dept: PHYSICAL THERAPY | Facility: CLINIC | Age: 58
End: 2023-02-03
Payer: COMMERCIAL

## 2023-02-03 DIAGNOSIS — Z98.890 S/P RIGHT ROTATOR CUFF REPAIR: Primary | ICD-10-CM

## 2023-02-03 DIAGNOSIS — Z78.9 DIFFICULTY PERFORMING PERSONAL GROOMING ACTIVITY: ICD-10-CM

## 2023-02-03 DIAGNOSIS — R29.898 SHOULDER WEAKNESS: ICD-10-CM

## 2023-02-03 DIAGNOSIS — M25.611 DECREASED ROM OF RIGHT SHOULDER: ICD-10-CM

## 2023-02-03 PROCEDURE — 97110 THERAPEUTIC EXERCISES: CPT | Performed by: PHYSICAL THERAPIST

## 2023-02-03 PROCEDURE — 97530 THERAPEUTIC ACTIVITIES: CPT | Performed by: PHYSICAL THERAPIST

## 2023-02-03 PROCEDURE — 97140 MANUAL THERAPY 1/> REGIONS: CPT | Performed by: PHYSICAL THERAPIST

## 2023-02-03 NOTE — PROGRESS NOTES
Physical Therapy Daily Treatment Note + Discharge Summary      Patient: Pj Steele   : 1965  Referring practitioner: CARISSA Mota  Date of Initial Visit: Type: THERAPY  Noted: 2022  Today's Date: 2/3/2023  Patient seen for 22 sessions         Pj Steele reports: work is going well; no trouble with motion or strength. Some difficulty with reaching behind him and turning a knob for prolonged amount of time at work. More fatigue after work than pain. Next follow up with surgeon next week.          Objective          Palpation     Right Tenderness of the anterior deltoid and supraspinatus.         Passive Range of Motion      Right Shoulder   Flexion: 170 degrees    Scaption: 170 degrees  External rotation 90°: 90 degrees  Internal rotation 45°: 85 degrees      Active Range of Motion      Right shoulder  Flexion: 165 degrees   ABD: 165 degrees  ER: 65 degrees; tight  BTH: C4  BTB: thoracic     Strength/Myotome Testing     Right shoulder  Flexion: 5/5  ABD: 5/5  ER: 4+/5 painful  IR: 5/5     Bicep: 5/5  Tricep: 5/5     See Exercise, Manual, and Modality Logs for complete treatment.       Assessment/Plan  Pt with PROM and AROM WNL and good strength. Most weakness and limitations in ER. Mild pain with activity still which he plans to discuss with MD and difficulty sleeping on R shoulder; advised to be patient, only 13 weeks s/p rotator cuff repair with bicep tenodesis. Mild pain with palpation along supraspinatus tendon. Pt has met all short term goals and 3/5 long term goals. Pt appropriate for discharge from PT to [facility] to continue strengthening and educated on importance of continuing to progress ER ROM and strength to return to full function. No difficulty at work; mostly fatigue. Pt with no questions/concerns about discharge.   Goals  Plan Goals: STG: ~8 weeks  1. Pt will demonstrate R shoulder PROM in flexion and abduction of >140 degrees to improve overhead activity-MET  2. Pt will  demonstrate R shoulder PROM in ER to 30 degrees-MET  3. Decrease right UE tenderness with palpation to minimal over the acromion and RTC tendon to be able to sleep on his side-MET  4. Pt will be able to progress to AROM of R shoulder without compensation of R UT and appropriate scapulo-humeral motion.-MET  5. Pt will improve QuickDASH score to 40 or less to improve subjective function of shoulder with ADLs-MET    LTG: ~16 weeks  1. Pt will demonstrate R shoulder AROM WNL and minimal pain to be able to reach in multiple planes/directions-MET  2. Pt will be able to reach lumbar spine using RUE-MET  3. Pt will improve R shoulder strength to 5/5 to improve quality of life-PROGRESSING (still lacking full ER strength)  4. Pt will improve QuickDASH score to 10 or less to improve subjective function of shoulder with ADLs-PROGRESSING  5. Pt will be able to return to work with full function of R shoulder-MET    Discharge to [facility]         Timed:  Manual Therapy:    12     mins  52807;  Therapeutic Exercise:    20     mins  91341;     Neuromuscular Renita:    -    mins  38915;    Therapeutic Activity:     8     mins  15814;     Gait Training:      -     mins  19873;     Ultrasound:     -     mins  90721;      Untimed:  Electrical Stimulation:    -     mins  09463 ( );  Mechanical Traction:    -     mins  06193;   Dry needling:      -     mins  32577/20561    Timed Treatment:   40   mins   Total Treatment:     40   mins  Celine Rios PT  Physical Therapist    License #: [id number]

## 2023-02-08 ENCOUNTER — OFFICE VISIT (OUTPATIENT)
Dept: ORTHOPEDIC SURGERY | Facility: CLINIC | Age: 58
End: 2023-02-08
Payer: COMMERCIAL

## 2023-02-08 VITALS — WEIGHT: 204 LBS | HEIGHT: 74 IN | BODY MASS INDEX: 26.18 KG/M2

## 2023-02-08 DIAGNOSIS — Z98.890 STATUS POST ARTHROSCOPY OF RIGHT SHOULDER: Primary | ICD-10-CM

## 2023-02-08 PROCEDURE — 99212 OFFICE O/P EST SF 10 MIN: CPT | Performed by: ORTHOPAEDIC SURGERY

## 2023-02-08 NOTE — PROGRESS NOTES
"Subjective:     Patient ID: Pj Steele is a 57 y.o. male.    Chief Complaint:  Follow-up status post right shoulder rotator cuff repair, biceps tenodesis-11/4/2022    History of Present Illness  Pj Steele returns to clinic today for evaluation of status post right shoulder rotator cuff repair.    The patient is doing fairly well at this point in time. He is still having some mild irritation on the lateral aspect of his arm, particularly at night when laying on that side. He rates it as a 3 to 4 out of 10, achy in nature. He denies any significant sharp pain. He states his motion and strength have continued to make good progress at this point in time. He denies any associated numbness or tingling. He denies any fevers, chills, or sweats. He is doing a home exercise program at this point primarily. He does not recall every trying Celebrex. He notes that the Flexeril that he was prescribed was too strong and made him feel \"loopy\".        Social History     Occupational History   • Not on file   Tobacco Use   • Smoking status: Never   • Smokeless tobacco: Never   Vaping Use   • Vaping Use: Never used   Substance and Sexual Activity   • Alcohol use: Not Currently     Comment: occasional   • Drug use: Never   • Sexual activity: Yes     Partners: Female     Birth control/protection: Surgical, Vasectomy      Past Medical History:   Diagnosis Date   • Arthritis    • Arthritis of back 2000   • Back pain    • Depression     Mild   • Frozen shoulder    • Hypertension    • Hypotestosteronemia    • Neck strain 1993   • Periarthritis of shoulder 1993   • Polyp of colon, adenomatous    • Polyp of colon, hyperplastic    • Rotator cuff syndrome 2021    MRI?     Past Surgical History:   Procedure Laterality Date   • CHOLECYSTECTOMY     • COLONOSCOPY N/A 04/24/2018    Procedure: COLONOSCOPY, polypectomy;  Surgeon: Josie Campos MD;  Location: [facility];  Service: Gastroenterology   • COLONOSCOPY N/A 05/06/2021    Procedure: " "COLONOSCOPY;  Surgeon: Anais Beltre MD;  Location: [facility];  Service: Gastroenterology;  Laterality: N/A;  diverticulosis   • SHOULDER SURGERY  11/4/2022   • WRIST SURGERY         Family History   Problem Relation Age of Onset   • Colon cancer Mother    • Broken bones Mother         Wrist   • Diabetes Mother    • Osteoporosis Mother    • Cancer Mother    • Leukemia Father    • Dementia Father    • Brain cancer Brother    • Malig Hyperthermia Neg Hx          Review of Systems        Objective:  Vitals:    02/08/23 0846   Weight: 92.5 kg (204 lb)   Height: 188 cm (74\")         02/08/23  0846   Weight: 92.5 kg (204 lb)     Body mass index is 26.19 kg/m².  General: No acute distress.  Resp: normal respiratory effort  Skin: no rashes or wounds; normal turgor  Psych: mood and affect appropriate; recent and remote memory intact          Ortho Exam       Right shoulder-  Incision is well healed.  Active Forward Flexion- 175 degrees  Strength- 4+/5  Active External Rotation- 40 degrees  Strength- 4/5  Belly press test strength- 5/5  Drop arm test- Negative  Ext rotation lag sign- Negative  Neer's sign- Mildly positive  Collins- Mildly positive  Brisk cap refill to all digits, palpable 2+ radial pulse    Imaging:  None today  Assessment:        1. Status post right shoulder arthroscopic rotator cuff repair with bio inductive implant, biceps tenodesis DOS 11/04/2022           Plan:        1. Discussed treatment options at length with patient at today's visit.  2. The patient is overall doing well at this point. He still is having some lateral shoulder pain in the region of his subacromial bursa. He still does have some weakness on his external rotation. His forward flexion is significantly improving in regards to strength, but he is progressing with both in regards to his functional status. It looks like the majority of his symptoms are coming from his subacromial bursa at this point in time.  3. We did discuss option " for transition to Celebrex, subacromial injection, but he has declined both at this point in time.  4. He would like to continue with his home exercise, work on strength and function as much as tolerated.  5. I will see him back in 2 months for repeat evaluation.      Pj Steele was in agreement with plan and had all questions answered.     Orders:  No orders of the defined types were placed in this encounter.      Medications:  No orders of the defined types were placed in this encounter.      Followup:  Return in about 2 months (around 4/8/2023).    Diagnoses and all orders for this visit:    1. Status post right shoulder arthroscopic rotator cuff repair with bio inductive implant, biceps tenodesis DOS 11/04/2022 (Primary)          Dictated utilizing Dragon dictation     Transcribed from ambient dictation for Parish Yin MD by Emily Neely.  02/08/23   11:19 EST    Patient or patient representative verbalized consent to the visit recording.  I have personally performed the services described in this document as transcribed by the above individual, and it is both accurate and complete.

## 2023-02-20 RX ORDER — MELOXICAM 15 MG/1
TABLET ORAL
Qty: 30 TABLET | Refills: 0 | Status: SHIPPED | OUTPATIENT
Start: 2023-02-20

## 2023-02-20 NOTE — TELEPHONE ENCOUNTER
Rx Refill Note  Requested Prescriptions     Pending Prescriptions Disp Refills    meloxicam (MOBIC) 15 MG tablet [Pharmacy Med Name: MELOXICAM 15 MG TABLET] 30 tablet 0     Sig: TAKE ONE TABLET BY MOUTH DAILY      Last office visit with prescribing clinician: 11/11/2022      Next office visit with prescribing clinician: Visit date not found   Last Filled:01.06.2023    Dx:status post right shoulder rotator cuff repair, biceps tenodesis-11/4/2022    Diane Ann MA  02/20/23, 08:43 EST    {TIP  Encounters:    {TIP  Please add Last Relevant Lab Date if appropriate:  {TIP  Is Refill Pharmacy correct?:

## 2023-03-14 ENCOUNTER — LAB (OUTPATIENT)
Dept: LAB | Facility: HOSPITAL | Age: 58
End: 2023-03-14
Payer: COMMERCIAL

## 2023-03-14 DIAGNOSIS — E78.2 MIXED HYPERLIPIDEMIA: ICD-10-CM

## 2023-03-14 LAB
ALBUMIN SERPL-MCNC: 4.9 G/DL (ref 3.5–5.2)
ALBUMIN/GLOB SERPL: 2.7 G/DL
ALP SERPL-CCNC: 52 U/L (ref 39–117)
ALT SERPL W P-5'-P-CCNC: 33 U/L (ref 1–41)
ANION GAP SERPL CALCULATED.3IONS-SCNC: 9.1 MMOL/L (ref 5–15)
AST SERPL-CCNC: 16 U/L (ref 1–40)
BILIRUB SERPL-MCNC: 0.6 MG/DL (ref 0–1.2)
BUN SERPL-MCNC: 14 MG/DL (ref 6–20)
BUN/CREAT SERPL: 13.6 (ref 7–25)
CALCIUM SPEC-SCNC: 10.1 MG/DL (ref 8.6–10.5)
CHLORIDE SERPL-SCNC: 100 MMOL/L (ref 98–107)
CHOLEST SERPL-MCNC: 267 MG/DL (ref 0–200)
CO2 SERPL-SCNC: 30.9 MMOL/L (ref 22–29)
CREAT SERPL-MCNC: 1.03 MG/DL (ref 0.76–1.27)
EGFRCR SERPLBLD CKD-EPI 2021: 84.7 ML/MIN/1.73
GLOBULIN UR ELPH-MCNC: 1.8 GM/DL
GLUCOSE SERPL-MCNC: 101 MG/DL (ref 65–99)
HDLC SERPL-MCNC: 48 MG/DL (ref 40–60)
LDLC SERPL CALC-MCNC: 191 MG/DL (ref 0–100)
LDLC/HDLC SERPL: 3.94 {RATIO}
POTASSIUM SERPL-SCNC: 4 MMOL/L (ref 3.5–5.2)
PROT SERPL-MCNC: 6.7 G/DL (ref 6–8.5)
SODIUM SERPL-SCNC: 140 MMOL/L (ref 136–145)
TRIGL SERPL-MCNC: 149 MG/DL (ref 0–150)
VLDLC SERPL-MCNC: 28 MG/DL (ref 5–40)

## 2023-03-14 PROCEDURE — 36415 COLL VENOUS BLD VENIPUNCTURE: CPT

## 2023-03-14 PROCEDURE — 80053 COMPREHEN METABOLIC PANEL: CPT

## 2023-03-14 PROCEDURE — 80061 LIPID PANEL: CPT

## 2023-04-17 ENCOUNTER — OFFICE VISIT (OUTPATIENT)
Dept: ORTHOPEDIC SURGERY | Facility: CLINIC | Age: 58
End: 2023-04-17
Payer: COMMERCIAL

## 2023-04-17 VITALS — WEIGHT: 210 LBS | HEIGHT: 74 IN | BODY MASS INDEX: 26.95 KG/M2

## 2023-04-17 DIAGNOSIS — M75.81 TENDINITIS OF RIGHT ROTATOR CUFF: ICD-10-CM

## 2023-04-17 DIAGNOSIS — M75.20 BICEPS TENDINITIS, UNSPECIFIED LATERALITY: ICD-10-CM

## 2023-04-17 DIAGNOSIS — M75.111 INCOMPLETE TEAR OF RIGHT ROTATOR CUFF, UNSPECIFIED WHETHER TRAUMATIC: ICD-10-CM

## 2023-04-17 DIAGNOSIS — M75.41 SUBACROMIAL IMPINGEMENT OF RIGHT SHOULDER: ICD-10-CM

## 2023-04-17 DIAGNOSIS — Z98.890 STATUS POST ARTHROSCOPY OF RIGHT SHOULDER: Primary | ICD-10-CM

## 2023-04-17 NOTE — PROGRESS NOTES
Subjective:     Patient ID: Pj Steele is a 58 y.o. male.    Chief Complaint:   Follow-up status post right shoulder rotator cuff repair, biceps tenodesis-11/4/2022    History of Present Illness  Pj Steele returns to clinic today for evaluation of of the right shoulder.     The patient is doing well overall, and notes improvement in his symptoms. He reports mild soreness in his lateral subacromial space. He rates his pain mild in intensity, exacerbated with external rotation and resisted activities. The patient is performing intermittent physical therapy on his own at home. He is out of formal physical therapy at this time. He has noted continued improvement in his motion and strength.     Social History     Occupational History   • Not on file   Tobacco Use   • Smoking status: Never   • Smokeless tobacco: Never   Vaping Use   • Vaping Use: Never used   Substance and Sexual Activity   • Alcohol use: Not Currently     Comment: occasional   • Drug use: Never   • Sexual activity: Yes     Partners: Female     Birth control/protection: Surgical, Vasectomy      Past Medical History:   Diagnosis Date   • Arthritis    • Arthritis of back 2000   • Back pain    • Depression     Mild   • Frozen shoulder    • Hypertension    • Hypotestosteronemia    • Neck strain 1993   • Periarthritis of shoulder 1993   • Polyp of colon, adenomatous    • Polyp of colon, hyperplastic    • Rotator cuff syndrome 2021    MRI?     Past Surgical History:   Procedure Laterality Date   • CHOLECYSTECTOMY     • COLONOSCOPY N/A 04/24/2018    Procedure: COLONOSCOPY, polypectomy;  Surgeon: Josie Campos MD;  Location: [facility];  Service: Gastroenterology   • COLONOSCOPY N/A 05/06/2021    Procedure: COLONOSCOPY;  Surgeon: Anais Beltre MD;  Location: [facility];  Service: Gastroenterology;  Laterality: N/A;  diverticulosis   • SHOULDER SURGERY  11/4/2022   • WRIST SURGERY         Family History   Problem Relation Age of Onset   • Colon  "cancer Mother    • Broken bones Mother         Wrist   • Diabetes Mother    • Osteoporosis Mother    • Cancer Mother    • Leukemia Father    • Dementia Father    • Brain cancer Brother    • Malig Hyperthermia Neg Hx          Review of Systems        Objective:  Vitals:    04/17/23 1448   Weight: 95.3 kg (210 lb)   Height: 188 cm (74\")         04/17/23  1448   Weight: 95.3 kg (210 lb)     Body mass index is 26.96 kg/m².  General: No acute distress.  Resp: normal respiratory effort  Skin: no rashes or wounds; normal turgor  Psych: mood and affect appropriate; recent and remote memory intact          Ortho Exam       right shoulder-  Incision is well healed  FF-   Active- 180 degrees   Strength- 4+/5  ER-      Active- 40   Strength- 4+/5  Belly press test-  Strength- 5/5  Empty can test- negative    Drop arm test- negative  Ext rotation lag sign- negative    Neer's sign- positive - mildly  Collins- positive - mildly        Imaging:  None today  Assessment:        1. Status post right shoulder arthroscopic rotator cuff repair with bio inductive implant, biceps tenodesis DOS 11/04/2022    2. Tendinitis of right rotator cuff    3. Subacromial impingement of right shoulder    4. Biceps tendinitis, unspecified laterality    5. Incomplete tear of right rotator cuff, unspecified whether traumatic           Plan:          1. Discussed treatment options at length with patient at today's visit including but not limited to prednisone taper, subacromial injection, and continued home exercises for work on strength and flexibility as well as heat. He wants to continue with conservative treatment at this point with home exercise program, declined prednisone or subacromial injection.  2. I will plan on seeing him back as needed and if he has recurrence of symptoms or further limitation, then we may consider one of the options mentioned.        Pj Steele was in agreement with plan and had all questions answered.     Orders:  No " orders of the defined types were placed in this encounter.      Medications:  No orders of the defined types were placed in this encounter.      Followup:  Return if symptoms worsen or fail to improve.    Diagnoses and all orders for this visit:    1. Status post right shoulder arthroscopic rotator cuff repair with bio inductive implant, biceps tenodesis DOS 11/04/2022 (Primary)    2. Tendinitis of right rotator cuff    3. Subacromial impingement of right shoulder    4. Biceps tendinitis, unspecified laterality    5. Incomplete tear of right rotator cuff, unspecified whether traumatic          Dictated utilizing Dragon dictation     Transcribed from ambient dictation for Parish Yin MD by Vaishali Liriano.  04/17/23   17:25 EDT    Patient or patient representative verbalized consent to the visit recording.  I have personally performed the services described in this document as transcribed by the above individual, and it is both accurate and complete.

## 2023-08-02 PROBLEM — F43.21 GRIEF: Status: ACTIVE | Noted: 2023-08-02

## 2023-08-02 PROBLEM — Z23 NEED FOR TETANUS, DIPHTHERIA, AND ACELLULAR PERTUSSIS (TDAP) VACCINE IN PATIENT OF ADOLESCENT AGE OR OLDER: Status: RESOLVED | Noted: 2020-05-07 | Resolved: 2023-08-02

## 2023-08-02 PROBLEM — Z23 NEED FOR INFLUENZA VACCINATION: Status: RESOLVED | Noted: 2018-11-02 | Resolved: 2023-08-02

## 2023-08-04 ENCOUNTER — OFFICE VISIT (OUTPATIENT)
Dept: FAMILY MEDICINE CLINIC | Facility: CLINIC | Age: 58
End: 2023-08-04
Payer: COMMERCIAL

## 2023-08-04 VITALS
HEART RATE: 75 BPM | BODY MASS INDEX: 27.34 KG/M2 | OXYGEN SATURATION: 99 % | SYSTOLIC BLOOD PRESSURE: 150 MMHG | WEIGHT: 213 LBS | TEMPERATURE: 98.2 F | HEIGHT: 74 IN | DIASTOLIC BLOOD PRESSURE: 80 MMHG

## 2023-08-04 DIAGNOSIS — F43.21 GRIEF: ICD-10-CM

## 2023-08-04 DIAGNOSIS — Z00.00 ANNUAL PHYSICAL EXAM: Primary | ICD-10-CM

## 2023-08-04 DIAGNOSIS — F51.01 PRIMARY INSOMNIA: ICD-10-CM

## 2023-08-04 DIAGNOSIS — I10 ESSENTIAL HYPERTENSION: ICD-10-CM

## 2023-08-04 DIAGNOSIS — F33.0 MILD EPISODE OF RECURRENT MAJOR DEPRESSIVE DISORDER: ICD-10-CM

## 2023-08-04 DIAGNOSIS — E78.2 MIXED HYPERLIPIDEMIA: ICD-10-CM

## 2023-08-04 DIAGNOSIS — Z12.5 SCREENING FOR PROSTATE CANCER: ICD-10-CM

## 2023-08-04 PROCEDURE — 99396 PREV VISIT EST AGE 40-64: CPT | Performed by: PHYSICIAN ASSISTANT

## 2023-08-04 RX ORDER — TRAZODONE HYDROCHLORIDE 50 MG/1
50 TABLET ORAL NIGHTLY
Qty: 30 TABLET | Refills: 0 | Status: SHIPPED | OUTPATIENT
Start: 2023-08-04

## 2023-08-04 RX ORDER — METOPROLOL SUCCINATE 25 MG/1
25 TABLET, EXTENDED RELEASE ORAL DAILY
Qty: 30 TABLET | Refills: 1 | Status: SHIPPED | OUTPATIENT
Start: 2023-08-04

## 2023-08-04 RX ORDER — FLUOXETINE HYDROCHLORIDE 20 MG/1
20 CAPSULE ORAL DAILY
Qty: 30 CAPSULE | Refills: 0 | Status: SHIPPED | OUTPATIENT
Start: 2023-08-04

## 2023-08-09 ENCOUNTER — LAB (OUTPATIENT)
Dept: LAB | Facility: HOSPITAL | Age: 58
End: 2023-08-09
Payer: COMMERCIAL

## 2023-08-09 DIAGNOSIS — Z12.5 SCREENING FOR PROSTATE CANCER: ICD-10-CM

## 2023-08-09 DIAGNOSIS — Z00.00 ANNUAL PHYSICAL EXAM: ICD-10-CM

## 2023-08-09 DIAGNOSIS — E78.2 MIXED HYPERLIPIDEMIA: Primary | ICD-10-CM

## 2023-08-09 DIAGNOSIS — E78.2 MIXED HYPERLIPIDEMIA: ICD-10-CM

## 2023-08-09 LAB
ALBUMIN SERPL-MCNC: 4.8 G/DL (ref 3.5–5.2)
ALBUMIN/GLOB SERPL: 2.2 G/DL
ALP SERPL-CCNC: 50 U/L (ref 39–117)
ALT SERPL W P-5'-P-CCNC: 21 U/L (ref 1–41)
ANION GAP SERPL CALCULATED.3IONS-SCNC: 10.9 MMOL/L (ref 5–15)
AST SERPL-CCNC: 17 U/L (ref 1–40)
BILIRUB SERPL-MCNC: 0.9 MG/DL (ref 0–1.2)
BUN SERPL-MCNC: 18 MG/DL (ref 6–20)
BUN/CREAT SERPL: 18.2 (ref 7–25)
CALCIUM SPEC-SCNC: 9.3 MG/DL (ref 8.6–10.5)
CHLORIDE SERPL-SCNC: 103 MMOL/L (ref 98–107)
CHOLEST SERPL-MCNC: 277 MG/DL (ref 0–200)
CO2 SERPL-SCNC: 28.1 MMOL/L (ref 22–29)
CREAT SERPL-MCNC: 0.99 MG/DL (ref 0.76–1.27)
DEPRECATED RDW RBC AUTO: 39.4 FL (ref 37–54)
EGFRCR SERPLBLD CKD-EPI 2021: 88.3 ML/MIN/1.73
ERYTHROCYTE [DISTWIDTH] IN BLOOD BY AUTOMATED COUNT: 12.6 % (ref 12.3–15.4)
GLOBULIN UR ELPH-MCNC: 2.2 GM/DL
GLUCOSE SERPL-MCNC: 96 MG/DL (ref 65–99)
HCT VFR BLD AUTO: 47.2 % (ref 37.5–51)
HDLC SERPL-MCNC: 43 MG/DL (ref 40–60)
HGB BLD-MCNC: 15.8 G/DL (ref 13–17.7)
LDLC SERPL CALC-MCNC: 196 MG/DL (ref 0–100)
LDLC/HDLC SERPL: 4.51 {RATIO}
MCH RBC QN AUTO: 28.8 PG (ref 26.6–33)
MCHC RBC AUTO-ENTMCNC: 33.5 G/DL (ref 31.5–35.7)
MCV RBC AUTO: 86.1 FL (ref 79–97)
PLATELET # BLD AUTO: 246 10*3/MM3 (ref 140–450)
PMV BLD AUTO: 8.7 FL (ref 6–12)
POTASSIUM SERPL-SCNC: 3.8 MMOL/L (ref 3.5–5.2)
PROT SERPL-MCNC: 7 G/DL (ref 6–8.5)
PSA SERPL-MCNC: 0.96 NG/ML (ref 0–4)
RBC # BLD AUTO: 5.48 10*6/MM3 (ref 4.14–5.8)
SODIUM SERPL-SCNC: 142 MMOL/L (ref 136–145)
TRIGL SERPL-MCNC: 200 MG/DL (ref 0–150)
VLDLC SERPL-MCNC: 38 MG/DL (ref 5–40)
WBC NRBC COR # BLD: 6.67 10*3/MM3 (ref 3.4–10.8)

## 2023-08-09 PROCEDURE — 85027 COMPLETE CBC AUTOMATED: CPT

## 2023-08-09 PROCEDURE — 36415 COLL VENOUS BLD VENIPUNCTURE: CPT

## 2023-08-09 PROCEDURE — G0103 PSA SCREENING: HCPCS

## 2023-08-09 PROCEDURE — 80061 LIPID PANEL: CPT

## 2023-08-09 PROCEDURE — 80053 COMPREHEN METABOLIC PANEL: CPT

## 2023-08-09 RX ORDER — ATORVASTATIN CALCIUM 10 MG/1
10 TABLET, FILM COATED ORAL DAILY
Qty: 90 TABLET | Refills: 1 | Status: SHIPPED | OUTPATIENT
Start: 2023-08-09

## 2023-08-30 DIAGNOSIS — F33.0 MILD EPISODE OF RECURRENT MAJOR DEPRESSIVE DISORDER: ICD-10-CM

## 2023-08-30 DIAGNOSIS — F43.21 GRIEF: ICD-10-CM

## 2023-08-30 DIAGNOSIS — F51.01 PRIMARY INSOMNIA: ICD-10-CM

## 2023-08-30 RX ORDER — FLUOXETINE HYDROCHLORIDE 20 MG/1
20 CAPSULE ORAL DAILY
Qty: 30 CAPSULE | Refills: 2 | Status: SHIPPED | OUTPATIENT
Start: 2023-08-30

## 2023-08-30 RX ORDER — TRAZODONE HYDROCHLORIDE 50 MG/1
TABLET ORAL
Qty: 30 TABLET | Refills: 2 | Status: SHIPPED | OUTPATIENT
Start: 2023-08-30

## 2023-09-01 ENCOUNTER — OFFICE VISIT (OUTPATIENT)
Dept: FAMILY MEDICINE CLINIC | Facility: CLINIC | Age: 58
End: 2023-09-01
Payer: COMMERCIAL

## 2023-09-01 VITALS
WEIGHT: 216 LBS | OXYGEN SATURATION: 98 % | DIASTOLIC BLOOD PRESSURE: 72 MMHG | TEMPERATURE: 97.6 F | BODY MASS INDEX: 27.72 KG/M2 | HEIGHT: 74 IN | HEART RATE: 76 BPM | SYSTOLIC BLOOD PRESSURE: 118 MMHG

## 2023-09-01 DIAGNOSIS — F33.0 MILD EPISODE OF RECURRENT MAJOR DEPRESSIVE DISORDER: ICD-10-CM

## 2023-09-01 DIAGNOSIS — I10 ESSENTIAL HYPERTENSION: Primary | ICD-10-CM

## 2023-09-01 DIAGNOSIS — E78.2 MIXED HYPERLIPIDEMIA: ICD-10-CM

## 2023-09-01 DIAGNOSIS — F43.21 GRIEF: ICD-10-CM

## 2023-09-01 PROCEDURE — 99213 OFFICE O/P EST LOW 20 MIN: CPT | Performed by: PHYSICIAN ASSISTANT

## 2023-09-01 RX ORDER — METOPROLOL SUCCINATE 25 MG/1
25 TABLET, EXTENDED RELEASE ORAL DAILY
Qty: 90 TABLET | Refills: 2 | Status: SHIPPED | OUTPATIENT
Start: 2023-09-01

## 2023-09-01 NOTE — PROGRESS NOTES
"Chief Complaint  Hypertension, Hyperlipidemia, and Depression (management)    Subjective          Pj Steele presents to [facility]  History of Present Illness  Pj is a 58-year-old male who presents for hypertension, hyperlipidemia and depression management.  States overall he is feeling well.  He is doing well with the blood pressure medication.  Has had no chest pain, shortness of air, headache, dizziness or swelling of ankles.  Pj has been taking the Prozac and trazodone medication for his depression symptoms.  Denied any suicidal or homicidal ideation.  States he tries to stay busy.  Appetite and sleep have been normal for him.     Objective   Vital Signs:   /72   Pulse 76   Temp 97.6 øF (36.4 øC)   Ht 188 cm (74\")   Wt 98 kg (216 lb)   SpO2 98%   BMI 27.73 kg/mý     Physical Exam  Vitals and nursing note reviewed.   Constitutional:       Appearance: Normal appearance. He is well-developed, well-groomed and overweight.   HENT:      Head: Normocephalic and atraumatic.   Neck:      Vascular: No carotid bruit.      Trachea: Trachea and phonation normal. No tracheal tenderness.   Cardiovascular:      Rate and Rhythm: Normal rate and regular rhythm.      Pulses: Normal pulses.      Heart sounds: Normal heart sounds, S1 normal and S2 normal. No murmur heard.  Pulmonary:      Effort: Pulmonary effort is normal.      Breath sounds: Normal breath sounds and air entry.   Abdominal:      General: Bowel sounds are normal.      Palpations: Abdomen is soft. There is no hepatomegaly.      Tenderness: There is no abdominal tenderness. There is no right CVA tenderness, left CVA tenderness, guarding or rebound. Negative signs include Iraheta's sign, Rovsing's sign, McBurney's sign, psoas sign and obturator sign.   Musculoskeletal:      Cervical back: Neck supple.      Right lower leg: No edema.      Left lower leg: No edema.   Skin:     General: Skin is warm and dry.      " Capillary Refill: Capillary refill takes less than 2 seconds.   Neurological:      Mental Status: He is alert and oriented to person, place, and time.   Psychiatric:         Attention and Perception: Attention and perception normal.         Mood and Affect: Mood and affect normal.         Speech: Speech normal.         Behavior: Behavior normal. Behavior is cooperative.         Thought Content: Thought content normal.         Cognition and Memory: Cognition and memory normal.         Judgment: Judgment normal.      Result Review :                 Assessment and Plan    Diagnoses and all orders for this visit:    1. Essential hypertension (Primary)  -     metoprolol succinate XL (Toprol XL) 25 MG 24 hr tablet; Take 1 tablet by mouth Daily.  Dispense: 90 tablet; Refill: 2    2. Mild episode of recurrent major depressive disorder    3. Grief    4. Mixed hyperlipidemia    1.  Chronic and stable hypertension: I rechecked blood pressure and got 130/82 in left arm.  I have refilled his Toprol medication to pharmacy.  Continue losartan medication at home as well.  Follow-up in 6 months.  2.  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  No refills needed.  3.  Chronic and stable hyperlipidemia: I have given him orders to get fasting blood work at [facility] for CMP and a lipid profile.  Continue the Lipitor medication at home.  Continue dietary changes.  He will be notified of test results when completed.    I spent 29 minutes caring for Pj on this date of service. This time includes time spent by me in the following activities:preparing for the visit, obtaining and/or reviewing a separately obtained history, performing a medically appropriate examination and/or evaluation , counseling and educating the patient/family/caregiver, ordering medications, tests, or procedures, and documenting information in the medical record  Follow Up   Return if symptoms  worsen or fail to improve.  Patient was given instructions and counseling regarding his condition or for health maintenance advice. Please see specific information pulled into the AVS if appropriate.     [facility]  [address] [place] [id number]  Dept: [phone]  Dept Fax: [phone]  Loc: [phone]  Loc Fax: [phone]

## 2023-11-21 ENCOUNTER — LAB (OUTPATIENT)
Dept: LAB | Facility: HOSPITAL | Age: 58
End: 2023-11-21
Payer: COMMERCIAL

## 2023-11-21 ENCOUNTER — TRANSCRIBE ORDERS (OUTPATIENT)
Dept: ADMINISTRATIVE | Facility: HOSPITAL | Age: 58
End: 2023-11-21
Payer: COMMERCIAL

## 2023-11-21 DIAGNOSIS — E78.5 HYPERLIPIDEMIA, UNSPECIFIED HYPERLIPIDEMIA TYPE: ICD-10-CM

## 2023-11-21 DIAGNOSIS — E78.5 HYPERLIPIDEMIA, UNSPECIFIED HYPERLIPIDEMIA TYPE: Primary | ICD-10-CM

## 2023-11-21 LAB
ALBUMIN SERPL-MCNC: 4.8 G/DL (ref 3.5–5.2)
ALBUMIN/GLOB SERPL: 2.7 G/DL
ALP SERPL-CCNC: 52 U/L (ref 39–117)
ALT SERPL W P-5'-P-CCNC: 24 U/L (ref 1–41)
ANION GAP SERPL CALCULATED.3IONS-SCNC: 8.5 MMOL/L (ref 5–15)
AST SERPL-CCNC: 14 U/L (ref 1–40)
BILIRUB SERPL-MCNC: 1 MG/DL (ref 0–1.2)
BUN SERPL-MCNC: 15 MG/DL (ref 6–20)
BUN/CREAT SERPL: 16.1 (ref 7–25)
CALCIUM SPEC-SCNC: 9.5 MG/DL (ref 8.6–10.5)
CHLORIDE SERPL-SCNC: 101 MMOL/L (ref 98–107)
CHOLEST SERPL-MCNC: 172 MG/DL (ref 0–200)
CO2 SERPL-SCNC: 29.5 MMOL/L (ref 22–29)
CREAT SERPL-MCNC: 0.93 MG/DL (ref 0.76–1.27)
EGFRCR SERPLBLD CKD-EPI 2021: 95.2 ML/MIN/1.73
GLOBULIN UR ELPH-MCNC: 1.8 GM/DL
GLUCOSE SERPL-MCNC: 96 MG/DL (ref 65–99)
HDLC SERPL-MCNC: 47 MG/DL (ref 40–60)
LDLC SERPL CALC-MCNC: 99 MG/DL (ref 0–100)
LDLC/HDLC SERPL: 2.04 {RATIO}
POTASSIUM SERPL-SCNC: 3.8 MMOL/L (ref 3.5–5.2)
PROT SERPL-MCNC: 6.6 G/DL (ref 6–8.5)
SODIUM SERPL-SCNC: 139 MMOL/L (ref 136–145)
TRIGL SERPL-MCNC: 146 MG/DL (ref 0–150)
VLDLC SERPL-MCNC: 26 MG/DL (ref 5–40)

## 2023-11-21 PROCEDURE — 80061 LIPID PANEL: CPT

## 2023-11-21 PROCEDURE — 80053 COMPREHEN METABOLIC PANEL: CPT

## 2023-11-21 PROCEDURE — 36415 COLL VENOUS BLD VENIPUNCTURE: CPT

## 2023-11-25 DIAGNOSIS — F33.0 MILD EPISODE OF RECURRENT MAJOR DEPRESSIVE DISORDER: ICD-10-CM

## 2023-11-25 DIAGNOSIS — F51.01 PRIMARY INSOMNIA: ICD-10-CM

## 2023-11-25 DIAGNOSIS — F43.21 GRIEF: ICD-10-CM

## 2023-11-26 RX ORDER — FLUOXETINE HYDROCHLORIDE 20 MG/1
20 CAPSULE ORAL DAILY
Qty: 30 CAPSULE | Refills: 2 | Status: SHIPPED | OUTPATIENT
Start: 2023-11-26

## 2023-11-26 RX ORDER — TRAZODONE HYDROCHLORIDE 50 MG/1
TABLET ORAL
Qty: 30 TABLET | Refills: 2 | Status: SHIPPED | OUTPATIENT
Start: 2023-11-26

## 2024-01-05 DIAGNOSIS — I10 ESSENTIAL HYPERTENSION: ICD-10-CM

## 2024-01-05 RX ORDER — LOSARTAN POTASSIUM 100 MG/1
TABLET ORAL
Qty: 90 TABLET | Refills: 0 | Status: SHIPPED | OUTPATIENT
Start: 2024-01-05

## 2024-02-05 DIAGNOSIS — I10 ESSENTIAL HYPERTENSION: ICD-10-CM

## 2024-02-05 RX ORDER — CHLORTHALIDONE 25 MG/1
25 TABLET ORAL DAILY
Qty: 30 TABLET | Refills: 0 | Status: SHIPPED | OUTPATIENT
Start: 2024-02-05

## 2024-02-29 DIAGNOSIS — F33.0 MILD EPISODE OF RECURRENT MAJOR DEPRESSIVE DISORDER: ICD-10-CM

## 2024-02-29 DIAGNOSIS — F51.01 PRIMARY INSOMNIA: ICD-10-CM

## 2024-02-29 DIAGNOSIS — F43.21 GRIEF: ICD-10-CM

## 2024-02-29 RX ORDER — FLUOXETINE HYDROCHLORIDE 20 MG/1
20 CAPSULE ORAL DAILY
Qty: 30 CAPSULE | Refills: 2 | Status: SHIPPED | OUTPATIENT
Start: 2024-02-29

## 2024-02-29 RX ORDER — TRAZODONE HYDROCHLORIDE 50 MG/1
TABLET ORAL
Qty: 30 TABLET | Refills: 2 | Status: SHIPPED | OUTPATIENT
Start: 2024-02-29

## 2024-03-02 DIAGNOSIS — I10 ESSENTIAL HYPERTENSION: ICD-10-CM

## 2024-03-02 RX ORDER — CHLORTHALIDONE 25 MG/1
TABLET ORAL
Qty: 30 TABLET | Refills: 0 | Status: SHIPPED | OUTPATIENT
Start: 2024-03-02

## 2024-03-07 NOTE — TELEPHONE ENCOUNTER
"  Physical Therapy  Physical Therapy Treatment Note    Patient Name: Dayanara Nelson  MRN: [id number]  Today's Date: 3/7/2024  Time Calculation  Start Time: 0715  Stop Time: 0815  Time Calculation (min): 60 min    Insurance:  Visit number: 12 of 50 (10 used in 2023)   Authorization info: no auth needed   Insurance Type: [place]     General:  Reason for visit: L knee pain s/p arthroscopy - fat pad excision, scar tissue removal on 1/25/24   Referred by: Dr. Umanzor   POW: 6     Current Problem  1. Acute pain of left knee        2. Muscle weakness              Precautions: none      Subjective:     Patient reports her knee is doing well overall. Had follow up with George METCALF to begin in line skating only and progress per PT. Patient reports she has been seeing Dana some but has not been doing heavier strengthening.    Pain  Pain Assessment: 0-10  Pain Score: 1    Performing HEP?: Yes      Objective:     0-138 deg L knee PROM  No lag with SLR   Patellar girth: 37.5 cm        Isometric testing: R quad: 75# L 45#  Treatment Performed:    Therapeutic Exercise:    55 min  Standing quad stretch 3 x 30\"  Slant board calf stretch 3 x 30\"  Hamstring stretch 3 x 30\"    Upright bike 5 minutes   BFR 80% LOP 30, 15,15,15  Heel taps 6\"  Goblet squats blue KB  Knee extension 15# SL to fatigue then DL  SL bridge   Side stepping RTB 2 x 10 yards   Monster walks RTB 1 lap  SL calf raises 2 x 15   NOT TODAY   DL squat to 20\" box 3 x 8 3\" hold 18# KB   12\" box step up 3 x 12   SL sit to stand 20\" box 3 x 8   NOT TODAY  -Sled push/pull 20 # 2 x 20 yards, 0#   Manual Therapy:    10 min-not today   Patellar mobilizations  STM L medial gastroc   PROM knee flexion and extension supine  AP tibial glides    Neuromuscular re-education 10 min-not today   Biofeedback 10 minutes (5 min quad sets, 5 min SLR flexion)   NOT TODAY  Blaze pods plank focus 3 x 30\" 30\" rest  Step up bosu forward and lateral 20 x each       Other: ice     L knee after session " Rx Refill Note  Requested Prescriptions     Pending Prescriptions Disp Refills    meloxicam (MOBIC) 15 MG tablet [Pharmacy Med Name: MELOXICAM 15 MG TABLET] 30 tablet 0     Sig: TAKE ONE TABLET BY MOUTH DAILY      Last office visit with prescribing clinician: 2/28/2022      Next office visit with prescribing clinician: Visit date not found   Last Filled:08.17.2022    Dx:    1. Tendinitis of right rotator cuff (Primary)     2. Subacromial impingement of right shoulder     3. Incomplete tear of right rotator cuff, unspecified whether traumatic       Diane Ann MA  09/14/22, 08:41 EDT    {TIP  Encounters:    {TIP  Please add Last Relevant Lab Date if appropriate:  {TIP  Is Refill Pharmacy correct?:     ice 10 min -ice to go       Assessment:   Patient has a 40% quad strength deficit with isometric testing at 60 deg. Continuing to emphasize strengthening for her quads as this will help to reduce stress on her patellofemoral joint. Patient fatigued with exercise progressions, mild soreness by end of session. Pt provided with work out plan to maximize her strengthening program- currently doing PT 1x per week so patient to perform strengthening at least two other days. Pt also to increase her cardio with biking/elliptical.     Plan:  Continue emphasizing ROM and quad activation. Continue with BFR and functional strengthening for quad.       Gracia Avitia, PT

## 2024-03-28 ENCOUNTER — OFFICE VISIT (OUTPATIENT)
Dept: FAMILY MEDICINE CLINIC | Facility: CLINIC | Age: 59
End: 2024-03-28
Payer: COMMERCIAL

## 2024-03-28 VITALS
SYSTOLIC BLOOD PRESSURE: 120 MMHG | OXYGEN SATURATION: 99 % | BODY MASS INDEX: 27.59 KG/M2 | WEIGHT: 215 LBS | HEIGHT: 74 IN | DIASTOLIC BLOOD PRESSURE: 76 MMHG | TEMPERATURE: 97.8 F | HEART RATE: 56 BPM

## 2024-03-28 DIAGNOSIS — I10 ESSENTIAL HYPERTENSION: ICD-10-CM

## 2024-03-28 DIAGNOSIS — J06.9 UPPER RESPIRATORY TRACT INFECTION, UNSPECIFIED TYPE: Primary | ICD-10-CM

## 2024-03-28 PROCEDURE — 99213 OFFICE O/P EST LOW 20 MIN: CPT | Performed by: NURSE PRACTITIONER

## 2024-03-28 RX ORDER — GUAIFENESIN 600 MG/1
1200 TABLET, EXTENDED RELEASE ORAL 2 TIMES DAILY
COMMUNITY
Start: 2024-03-28

## 2024-03-28 RX ORDER — DOXYCYCLINE HYCLATE 100 MG/1
100 CAPSULE ORAL 2 TIMES DAILY
Qty: 14 CAPSULE | Refills: 0 | Status: SHIPPED | OUTPATIENT
Start: 2024-03-28 | End: 2024-04-04

## 2024-03-28 RX ORDER — CHLORTHALIDONE 25 MG/1
TABLET ORAL
Qty: 30 TABLET | Refills: 0 | Status: SHIPPED | OUTPATIENT
Start: 2024-03-28

## 2024-03-28 NOTE — PROGRESS NOTES
"Answers submitted by the patient for this visit:  Primary Reason for Visit (Submitted on 3/26/2024)  What is the primary reason for your visit?: Cough  Cough Questionnaire (Submitted on 3/26/2024)  Chief Complaint: Cough  Chronicity: new  Onset: in the past 7 days  Progression since onset: improving  Frequency: every few minutes  Cough characteristics: rattling, productive of clear sputum, productive of yellow sputum  chest pain: No  chills: No  ear congestion: Yes  ear pain: No  fever: No  headaches: Yes  heartburn: No  hemoptysis: No  myalgias: No  nasal congestion: Yes  postnasal drip: Yes  rash: No  rhinorrhea: No  shortness of breath: No  sore throat: No  sweats: No  weight loss: No  wheezing: Yes  Aggravated by: lying down, pollens  Risk factors for lung disease: occupational exposure    Chief Complaint   Patient presents with    Sinusitis     Nasal drainage x 2 weeks ago today     Cough     Very little is coming up when coughs        Upper Respiratory Infection: Patient complains of symptoms of a URI. Symptoms include congestion, cough, and sore throat. Onset of symptoms was 2 weeks ago, unchanged since that time. He also c/o achiness, congestion, no  fever, productive cough with  yellow colored sputum, and sinus pressure for the past 2 weeks .  He is drinking moderate amounts of fluids. Evaluation to date: none. Treatment to date: antihistamines.  Ill contacts at home or school or work discussed. Home COVID test > 1 week ago negative.     The following portions of the patient's history were reviewed and updated as appropriate: allergies, current medications, past family history, past medical history, past social history, past surgical history and problem list.        Vitals:    03/28/24 1327   BP: 120/76   BP Location: Left arm   Patient Position: Sitting   Cuff Size: Adult   Pulse: 56   Temp: 97.8 °F (36.6 °C)   SpO2: 99%   Weight: 97.5 kg (215 lb)   Height: 188 cm (74\")     Gen: Mildly ill appearing, " alert, masked  Ears: TM's bulging on left without redness  Nose:  Congestion  Throat:  Pink without exudate, some drainage  Neck: No LAD  Lung: Good air movement, regular RR  Heart: RR without murmur  Skin: No rash      Assessment & Plan   Diagnoses and all orders for this visit:    1. Upper respiratory tract infection, unspecified type (Primary)  -     doxycycline (VIBRAMYCIN) 100 MG capsule; Take 1 capsule by mouth 2 (Two) Times a Day for 7 days.  Dispense: 14 capsule; Refill: 0    Other orders  -     guaiFENesin (Mucinex) 600 MG 12 hr tablet; Take 2 tablets by mouth 2 (Two) Times a Day.               Tylenol or Advil as needed for pain, fever, muscle aches  Plenty of fluids  Hand washing discussed  Off work or school note given if needed.  Warm tea for throat.  Pros and cons of antibiotic use discussed.  Instructed to notify us if symptoms worsen or do not improve.      Chapis Harrison, CARISSA  Family Practice  [facility]

## 2024-03-31 DIAGNOSIS — I10 ESSENTIAL HYPERTENSION: ICD-10-CM

## 2024-03-31 RX ORDER — LOSARTAN POTASSIUM 100 MG/1
100 TABLET ORAL DAILY
Qty: 30 TABLET | Refills: 2 | Status: SHIPPED | OUTPATIENT
Start: 2024-03-31

## 2024-04-28 DIAGNOSIS — I10 ESSENTIAL HYPERTENSION: ICD-10-CM

## 2024-04-28 RX ORDER — CHLORTHALIDONE 25 MG/1
25 TABLET ORAL DAILY
Qty: 90 TABLET | Refills: 0 | Status: SHIPPED | OUTPATIENT
Start: 2024-04-28

## 2024-05-25 DIAGNOSIS — F51.01 PRIMARY INSOMNIA: ICD-10-CM

## 2024-05-25 DIAGNOSIS — F43.21 GRIEF: ICD-10-CM

## 2024-05-25 DIAGNOSIS — F33.0 MILD EPISODE OF RECURRENT MAJOR DEPRESSIVE DISORDER: ICD-10-CM

## 2024-05-27 RX ORDER — FLUOXETINE HYDROCHLORIDE 20 MG/1
20 CAPSULE ORAL DAILY
Qty: 90 CAPSULE | Refills: 0 | Status: SHIPPED | OUTPATIENT
Start: 2024-05-27

## 2024-05-27 RX ORDER — TRAZODONE HYDROCHLORIDE 50 MG/1
TABLET ORAL
Qty: 90 TABLET | Refills: 0 | Status: SHIPPED | OUTPATIENT
Start: 2024-05-27

## 2024-06-28 DIAGNOSIS — I10 ESSENTIAL HYPERTENSION: ICD-10-CM

## 2024-06-28 RX ORDER — METOPROLOL SUCCINATE 25 MG/1
25 TABLET, EXTENDED RELEASE ORAL DAILY
Qty: 30 TABLET | Refills: 0 | Status: SHIPPED | OUTPATIENT
Start: 2024-06-28

## 2024-06-28 RX ORDER — LOSARTAN POTASSIUM 100 MG/1
100 TABLET ORAL DAILY
Qty: 30 TABLET | Refills: 0 | Status: SHIPPED | OUTPATIENT
Start: 2024-06-28

## 2024-07-29 ENCOUNTER — TELEPHONE (OUTPATIENT)
Dept: FAMILY MEDICINE CLINIC | Facility: CLINIC | Age: 59
End: 2024-07-29
Payer: COMMERCIAL

## 2024-07-29 DIAGNOSIS — I10 ESSENTIAL HYPERTENSION: ICD-10-CM

## 2024-07-29 NOTE — TELEPHONE ENCOUNTER
HUB TO RELAY:   Tried to call patient to see if he would like to establish care with another provider here in the office, since his PCP has retired.   Patient did not answer, LVM.

## 2024-07-31 DIAGNOSIS — I10 ESSENTIAL HYPERTENSION: ICD-10-CM

## 2024-07-31 RX ORDER — METOPROLOL SUCCINATE 25 MG/1
25 TABLET, EXTENDED RELEASE ORAL DAILY
Qty: 30 TABLET | Refills: 0 | Status: SHIPPED | OUTPATIENT
Start: 2024-07-31

## 2024-07-31 RX ORDER — LOSARTAN POTASSIUM 100 MG/1
100 TABLET ORAL DAILY
Qty: 30 TABLET | Refills: 0 | Status: SHIPPED | OUTPATIENT
Start: 2024-07-31

## 2024-08-26 DIAGNOSIS — F43.21 GRIEF: ICD-10-CM

## 2024-08-26 DIAGNOSIS — F33.0 MILD EPISODE OF RECURRENT MAJOR DEPRESSIVE DISORDER: ICD-10-CM

## 2024-08-26 DIAGNOSIS — F51.01 PRIMARY INSOMNIA: ICD-10-CM

## 2024-08-26 RX ORDER — TRAZODONE HYDROCHLORIDE 50 MG/1
50 TABLET, FILM COATED ORAL NIGHTLY
Qty: 90 TABLET | Refills: 0 | Status: SHIPPED | OUTPATIENT
Start: 2024-08-26

## 2024-08-30 DIAGNOSIS — I10 ESSENTIAL HYPERTENSION: ICD-10-CM

## 2024-08-30 RX ORDER — LOSARTAN POTASSIUM 100 MG/1
100 TABLET ORAL DAILY
Qty: 30 TABLET | Refills: 0 | Status: SHIPPED | OUTPATIENT
Start: 2024-08-30

## 2024-08-30 RX ORDER — METOPROLOL SUCCINATE 25 MG/1
25 TABLET, EXTENDED RELEASE ORAL DAILY
Qty: 30 TABLET | Refills: 0 | Status: SHIPPED | OUTPATIENT
Start: 2024-08-30

## 2024-09-05 ENCOUNTER — OFFICE VISIT (OUTPATIENT)
Dept: FAMILY MEDICINE CLINIC | Facility: CLINIC | Age: 59
End: 2024-09-05
Payer: COMMERCIAL

## 2024-09-05 VITALS
DIASTOLIC BLOOD PRESSURE: 86 MMHG | SYSTOLIC BLOOD PRESSURE: 136 MMHG | TEMPERATURE: 96.9 F | BODY MASS INDEX: 27.21 KG/M2 | OXYGEN SATURATION: 97 % | WEIGHT: 212 LBS | HEART RATE: 62 BPM | HEIGHT: 74 IN

## 2024-09-05 DIAGNOSIS — Z12.5 SCREENING FOR PROSTATE CANCER: ICD-10-CM

## 2024-09-05 DIAGNOSIS — Z76.89 ENCOUNTER TO ESTABLISH CARE: ICD-10-CM

## 2024-09-05 DIAGNOSIS — E78.00 HYPERCHOLESTEROLEMIA: ICD-10-CM

## 2024-09-05 DIAGNOSIS — Z00.00 ANNUAL PHYSICAL EXAM: Primary | ICD-10-CM

## 2024-09-05 DIAGNOSIS — Z12.11 COLON CANCER SCREENING: ICD-10-CM

## 2024-09-05 DIAGNOSIS — I10 ESSENTIAL HYPERTENSION: ICD-10-CM

## 2024-09-05 DIAGNOSIS — F33.0 MILD EPISODE OF RECURRENT MAJOR DEPRESSIVE DISORDER: ICD-10-CM

## 2024-09-05 PROBLEM — R07.89 CHEST PAIN, ATYPICAL: Status: RESOLVED | Noted: 2017-12-11 | Resolved: 2024-09-05

## 2024-09-05 PROBLEM — Z01.818 PREOPERATIVE CLEARANCE: Status: RESOLVED | Noted: 2022-10-26 | Resolved: 2024-09-05

## 2024-09-05 PROCEDURE — 99396 PREV VISIT EST AGE 40-64: CPT

## 2024-09-05 NOTE — PROGRESS NOTES
Chief Complaint   Patient presents with    Establish Care    Annual Exam       Patient Care Team:  Génesis Lozada APRN as PCP - General (Family Medicine)    Subjective   Pj Stelee is a 59 y.o. male who was a patient of Nuria Byers PA-C who is no longer with our practice.  I will be taking over this patient's primary care.  This is the first time patient is seeing me.  Pj is here to establish care and is here for a yearly physical exam. The patient reports no problems today. Chronic care management includes hypertension, hyperlipidemia, and depression.     HTN - currently stable on losartan 100mg daily, metoprolol 25mg daily, and chlorthalidone 25mg daily. Home BP is typically in the 130s/80s. Denies headache, chest pain, SOA.     Hyperlipidemia - Last TC 11/23 172, trig 146, LDL 99. Has been on lipitor 10mg daily with good compliance and no adverse effects. Previously had myalgias with high dose statin but tolerating current medication and dose without myalgias.    Depression/grief - has been on Prozac 20mg daily with good control of symptoms and no adverse effects since his wife passed away. He has not had grief counseling or therapy in the past. Previously attended a peer support group at his [employer] but no longer attends this. States he gets home from work and doesn't have any desire to do anything. Feels his prozac dose is adequate for his needs. Denies SI/HI.    Do you take any herbs or supplements that were not prescribed by a doctor? yes force factor with beets. If so, these will be added to active medication list.    Health Habits:  Dental Exam: Up to date  Eye Exam: Not Up to date  Diet: avoids excess sugar, limits red meat, eats organic fruits and vegetables.  Exercise: 5 days per week   Current exercise activities include: through his work as a   PSA: 8/23 0.959  Dexa: never  Colonoscopy: 2021 polyps removed, due now    The following portions of the patient's history were  "reviewed and updated as appropriate: allergies, current medications, past family history, past medical history, past social history, past surgical history, and problem list.    Social and Family and Surgical History reviewed and updated today, see Rooming tab.    Health History, Preventive Measures and Vaccination flow sheets reviewed and updated today.    Patient's current medical chart in Epic; including previous office notes, imaging, labs, specialist's evaluation either in notes or in Media tab reviewed today.    Other pertinent medical information also reviewed thru Care Everywhere function is also reviewed today.    Review of Systems  Review of Systems  Vitals:    09/05/24 1430   BP: 136/86   Pulse: 62   Temp: 96.9 °F (36.1 °C)   SpO2: 97%   Weight: 96.2 kg (212 lb)   Height: 188 cm (74\")     Wt Readings from Last 3 Encounters:   09/05/24 96.2 kg (212 lb)   03/28/24 97.5 kg (215 lb)   09/01/23 98 kg (216 lb)       Physical Exam  Constitutional:       General: He is not in acute distress.     Appearance: Normal appearance. He is overweight. He is not ill-appearing.   Eyes:      Conjunctiva/sclera: Conjunctivae normal.      Pupils: Pupils are equal, round, and reactive to light.   Cardiovascular:      Rate and Rhythm: Normal rate.   Pulmonary:      Effort: Pulmonary effort is normal.   Musculoskeletal:         General: Normal range of motion.      Cervical back: Neck supple.      Right lower leg: No edema.      Left lower leg: No edema.   Skin:     Comments: 1 inch linear scar left neck from basal cell carcinoma removal   Neurological:      Mental Status: He is alert and oriented to person, place, and time.   Psychiatric:         Mood and Affect: Mood normal.         Speech: Speech normal.         Behavior: Behavior normal.         Thought Content: Thought content normal. Thought content does not include homicidal or suicidal ideation.         Cognition and Memory: Cognition normal.         Judgment: Judgment " normal.      Comments: Sad when talking about his wife's illness and passing         BMI is >= 25 and <30. (Overweight) The following options were offered after discussion;: weight loss educational material (shared in after visit summary) and exercise counseling/recommendations       Results for orders placed or performed in visit on 11/21/23   Comprehensive Metabolic Panel    Specimen: Blood   Result Value Ref Range    Glucose 96 65 - 99 mg/dL    BUN 15 6 - 20 mg/dL    Creatinine 0.93 0.76 - 1.27 mg/dL    Sodium 139 136 - 145 mmol/L    Potassium 3.8 3.5 - 5.2 mmol/L    Chloride 101 98 - 107 mmol/L    CO2 29.5 (H) 22.0 - 29.0 mmol/L    Calcium 9.5 8.6 - 10.5 mg/dL    Total Protein 6.6 6.0 - 8.5 g/dL    Albumin 4.8 3.5 - 5.2 g/dL    ALT (SGPT) 24 1 - 41 U/L    AST (SGOT) 14 1 - 40 U/L    Alkaline Phosphatase 52 39 - 117 U/L    Total Bilirubin 1.0 0.0 - 1.2 mg/dL    Globulin 1.8 gm/dL    A/G Ratio 2.7 g/dL    BUN/Creatinine Ratio 16.1 7.0 - 25.0    Anion Gap 8.5 5.0 - 15.0 mmol/L    eGFR 95.2 >60.0 mL/min/1.73   Lipid Panel With LDL / HDL Ratio    Specimen: Blood   Result Value Ref Range    Total Cholesterol 172 0 - 200 mg/dL    Triglycerides 146 0 - 150 mg/dL    HDL Cholesterol 47 40 - 60 mg/dL    LDL Cholesterol  99 0 - 100 mg/dL    VLDL Cholesterol 26 5 - 40 mg/dL    LDL/HDL Ratio 2.04      Assessment & Plan   Healthy male exam.  Diagnoses and all orders for this visit:    1. Annual physical exam (Primary)    2. Encounter to establish care    3. Hypercholesterolemia  Chronic, stable. Continue current Lipitor. Return fasting for labs  -     Lipid Panel With / Chol / HDL Ratio; Future  -     CBC (No Diff); Future  -     Comprehensive Metabolic Panel; Future  -     TSH; Future  -     Hemoglobin A1c; Future    4. Mild episode of recurrent major depressive disorder  Chronic, stable. Counseling resources given to patient. Continue current prozac. Call [phone] if any SI/HI.  5. Essential hypertension  Chronic, stable.  Continue current losartan, chlorthalidone, and metoprolol. Call if BP persistently >140/90.  -     Lipid Panel With / Chol / HDL Ratio; Future  -     CBC (No Diff); Future  -     Comprehensive Metabolic Panel; Future  -     TSH; Future  -     Hemoglobin A1c; Future    6. Screening for prostate cancer  -     PSA Screen; Future    7. Colon cancer screening  -     Ambulatory Referral For Screening Colonoscopy        1. Reviewed all pertinent medical, family, surgical, and social history today. Due for fasting labs, ordered. No change in medication regimen today. Patient will call when ready for refills. Due for colonoscopy, ordered. Due for eye exam, will call to schedule. Will call with lab results and await results for further recommendation. Diet recommendations given.   2. Patient Counseling:  --Nutrition: Stressed importance of moderation in sodium/caffeine intake, saturated fat and cholesterol.  Discussed caloric balance, sufficient intake of fresh fruits, vegetables, fiber,    calcium, iron.  --Exercise: Stressed the importance of regular exercise.   --Substance Abuse: Discussed cessation/primary prevention of tobacco, alcohol, or other drug use; driving or other dangerous activities under the influence.    --Dental health: Discussed importance of regular tooth brushing, flossing, and dental visits.  --Suggested having eyes and vision checked if needed or past due.  --Immunizations reviewed.  --Discussed benefits of screening colonoscopy.  3. Discussed the patient's BMI with him.  The BMI is above average; BMI management plan is completed  4. Follow up in 6 months     Patient was given instructions and counseling regarding condition or for health maintenance advice.  Please see specific information pulled into the AVS if appropriate.      Medications Discontinued During This Encounter   Medication Reason    meloxicam (MOBIC) 15 MG tablet *Therapy completed    guaiFENesin (Mucinex) 600 MG 12 hr tablet *Therapy  completed    cyclobenzaprine (FLEXERIL) 5 MG tablet *Therapy completed    meloxicam (MOBIC) 15 MG tablet *Therapy completed          CARISSA Alaniz  [facility]  [address] [place] [address]

## 2024-09-28 ENCOUNTER — LAB (OUTPATIENT)
Dept: LAB | Facility: HOSPITAL | Age: 59
End: 2024-09-28
Payer: COMMERCIAL

## 2024-09-28 PROCEDURE — G0103 PSA SCREENING: HCPCS

## 2024-09-28 PROCEDURE — 80050 GENERAL HEALTH PANEL: CPT

## 2024-09-28 PROCEDURE — 83036 HEMOGLOBIN GLYCOSYLATED A1C: CPT

## 2024-09-28 PROCEDURE — 80061 LIPID PANEL: CPT

## 2024-09-30 ENCOUNTER — TELEPHONE (OUTPATIENT)
Dept: FAMILY MEDICINE CLINIC | Facility: CLINIC | Age: 59
End: 2024-09-30
Payer: COMMERCIAL

## 2024-09-30 DIAGNOSIS — I10 ESSENTIAL HYPERTENSION: ICD-10-CM

## 2024-09-30 RX ORDER — METOPROLOL SUCCINATE 25 MG/1
25 TABLET, EXTENDED RELEASE ORAL DAILY
Qty: 30 TABLET | Refills: 0 | Status: SHIPPED | OUTPATIENT
Start: 2024-09-30

## 2024-09-30 RX ORDER — LOSARTAN POTASSIUM 100 MG/1
100 TABLET ORAL DAILY
Qty: 30 TABLET | Refills: 0 | Status: SHIPPED | OUTPATIENT
Start: 2024-09-30

## 2024-09-30 NOTE — TELEPHONE ENCOUNTER
Spoke with patient. Patient understands. States he takes his medications at lunch. States he is taking the Lipitor 10 mg.       ----- Message from Génesis Lozada sent at 9/30/2024 11:39 AM EDT -----    Jay Oliva, your labs are back. Your cholesterol levels have increased since last year with your total cholesterol 214 (we want this less than 200), triglycerides 166 (we want this less than 150, and LDL of 140 (we want this less than 100). You're being prescribed lipitor 10mg daily, are you taking this? All of your other labs are normal.

## 2024-10-01 ENCOUNTER — TELEPHONE (OUTPATIENT)
Dept: GASTROENTEROLOGY | Facility: CLINIC | Age: 59
End: 2024-10-01
Payer: COMMERCIAL

## 2024-10-01 NOTE — TELEPHONE ENCOUNTER
FAST TRACK - REFERRAL  LAST COLONOSCOPY 05/06/2021 BY DR. JEAN - 5 YEAR RECALL  LAST COLONOSCOPY 05/06/2021  PERSONAL HISTORY POLYPS  FAMILY HISTORY CRC, MOTHER  SCHEDULE AT [place].    Sent recall for 05/2026    Message sent to referring provider.

## 2024-10-30 DIAGNOSIS — I10 ESSENTIAL HYPERTENSION: ICD-10-CM

## 2024-10-30 RX ORDER — METOPROLOL SUCCINATE 25 MG/1
25 TABLET, EXTENDED RELEASE ORAL DAILY
Qty: 30 TABLET | Refills: 0 | Status: SHIPPED | OUTPATIENT
Start: 2024-10-30

## 2024-10-30 RX ORDER — LOSARTAN POTASSIUM 100 MG/1
100 TABLET ORAL DAILY
Qty: 30 TABLET | Refills: 0 | Status: SHIPPED | OUTPATIENT
Start: 2024-10-30

## 2024-11-25 DIAGNOSIS — F33.0 MILD EPISODE OF RECURRENT MAJOR DEPRESSIVE DISORDER: ICD-10-CM

## 2024-11-25 DIAGNOSIS — F43.21 GRIEF: ICD-10-CM

## 2024-11-25 DIAGNOSIS — F51.01 PRIMARY INSOMNIA: ICD-10-CM

## 2024-11-25 DIAGNOSIS — J01.00 ACUTE NON-RECURRENT MAXILLARY SINUSITIS: Primary | ICD-10-CM

## 2024-11-25 RX ORDER — AZITHROMYCIN 250 MG/1
TABLET, FILM COATED ORAL
Qty: 6 TABLET | Refills: 0 | Status: SHIPPED | OUTPATIENT
Start: 2024-11-25

## 2024-11-25 RX ORDER — TRAZODONE HYDROCHLORIDE 50 MG/1
50 TABLET, FILM COATED ORAL NIGHTLY
Qty: 90 TABLET | Refills: 1 | Status: SHIPPED | OUTPATIENT
Start: 2024-11-25

## 2024-11-29 DIAGNOSIS — I10 ESSENTIAL HYPERTENSION: ICD-10-CM

## 2024-12-02 RX ORDER — LOSARTAN POTASSIUM 100 MG/1
100 TABLET ORAL DAILY
Qty: 30 TABLET | Refills: 0 | Status: SHIPPED | OUTPATIENT
Start: 2024-12-02

## 2024-12-02 RX ORDER — METOPROLOL SUCCINATE 25 MG/1
25 TABLET, EXTENDED RELEASE ORAL DAILY
Qty: 30 TABLET | Refills: 0 | Status: SHIPPED | OUTPATIENT
Start: 2024-12-02

## 2024-12-30 DIAGNOSIS — I10 ESSENTIAL HYPERTENSION: ICD-10-CM

## 2024-12-30 RX ORDER — METOPROLOL SUCCINATE 25 MG/1
25 TABLET, EXTENDED RELEASE ORAL DAILY
Qty: 30 TABLET | Refills: 0 | Status: SHIPPED | OUTPATIENT
Start: 2024-12-30

## 2024-12-30 RX ORDER — LOSARTAN POTASSIUM 100 MG/1
100 TABLET ORAL DAILY
Qty: 30 TABLET | Refills: 0 | Status: SHIPPED | OUTPATIENT
Start: 2024-12-30

## 2025-01-28 DIAGNOSIS — I10 ESSENTIAL HYPERTENSION: ICD-10-CM

## 2025-01-28 RX ORDER — LOSARTAN POTASSIUM 100 MG/1
100 TABLET ORAL DAILY
Qty: 30 TABLET | Refills: 0 | Status: SHIPPED | OUTPATIENT
Start: 2025-01-28

## 2025-01-28 RX ORDER — METOPROLOL SUCCINATE 25 MG/1
25 TABLET, EXTENDED RELEASE ORAL DAILY
Qty: 30 TABLET | Refills: 0 | Status: SHIPPED | OUTPATIENT
Start: 2025-01-28

## 2025-02-26 DIAGNOSIS — I10 ESSENTIAL HYPERTENSION: ICD-10-CM

## 2025-02-26 RX ORDER — METOPROLOL SUCCINATE 25 MG/1
25 TABLET, EXTENDED RELEASE ORAL DAILY
Qty: 30 TABLET | Refills: 0 | Status: SHIPPED | OUTPATIENT
Start: 2025-02-26

## 2025-02-26 RX ORDER — LOSARTAN POTASSIUM 100 MG/1
100 TABLET ORAL DAILY
Qty: 30 TABLET | Refills: 0 | Status: SHIPPED | OUTPATIENT
Start: 2025-02-26

## 2025-03-03 ENCOUNTER — OFFICE VISIT (OUTPATIENT)
Dept: FAMILY MEDICINE CLINIC | Facility: CLINIC | Age: 60
End: 2025-03-03
Payer: COMMERCIAL

## 2025-03-03 VITALS
HEART RATE: 87 BPM | TEMPERATURE: 97.5 F | HEIGHT: 74 IN | OXYGEN SATURATION: 98 % | BODY MASS INDEX: 27.97 KG/M2 | SYSTOLIC BLOOD PRESSURE: 150 MMHG | DIASTOLIC BLOOD PRESSURE: 84 MMHG | WEIGHT: 217.9 LBS

## 2025-03-03 DIAGNOSIS — F51.01 PRIMARY INSOMNIA: ICD-10-CM

## 2025-03-03 DIAGNOSIS — E78.00 HYPERCHOLESTEROLEMIA: ICD-10-CM

## 2025-03-03 DIAGNOSIS — I10 ESSENTIAL HYPERTENSION: Primary | ICD-10-CM

## 2025-03-03 DIAGNOSIS — F43.21 GRIEF: ICD-10-CM

## 2025-03-03 DIAGNOSIS — F33.0 MILD EPISODE OF RECURRENT MAJOR DEPRESSIVE DISORDER: ICD-10-CM

## 2025-03-03 PROCEDURE — 99214 OFFICE O/P EST MOD 30 MIN: CPT

## 2025-03-03 NOTE — PROGRESS NOTES
Patient or patient representative verbalized consent for the use of Ambient Listening during the visit with  CARISSA Alaniz for chart documentation. 3/3/2025  16:42 EST    Chief Complaint   Patient presents with    Hypertension       Subjective      Patient ID: Pj is a 59 y.o. male.     Chief Complaint   Patient presents with    Hypertension        Hypertension         History of Present Illness  Pj Steele presents today for 6-month chronic care follow-up of hypertension, hyperlipidemia, and depression.    He reports fluctuating mood, with some days being better than others. He has been informed of his snoring habit but has not undergone a sleep study to rule out sleep apnea. He experiences intermittent headaches but reports no chest pain, shortness of breath, or flushing. He also reports no ankle swelling. He does not monitor his blood pressure at home, despite having a cuff. He recalls an incident where he woke up feeling unwell and found his blood pressure to be significantly elevated. He managed to bring it down by taking his medication and resting. He admits to not being very active currently.    Hypertension -currently prescribed losartan 100 mg daily, metoprolol 25 mg daily, and chlorthalidone 25 mg daily. He is compliant with his blood pressure medications, and has been on these medications for an extended period and tolerates them well.  He does not routinely check his home blood pressure.  Blood pressure in office today was initially elevated at 160/90.  On repeat, it was found to be 150/84.  He denies any chest pain, shortness of breath, dizziness, flushing, ringing in the ears, or ankle swelling.  He does report occasional headaches.  He recounts an episode in the past where he woke up not feeling well and checked his blood pressure and it was high.  He took his blood pressure medication and rechecked it and it returned to normal.    Hyperlipidemia -last fasting , with  triglycerides of 166, and LDL of 140.  Currently prescribed Lipitor 10 mg daily.  The 10-year ASCVD risk score (Mesha XIAO, et al., 2019) is: 15.7%    Values used to calculate the score:      Age: 59 years      Sex: Male      Is Non- : No      Diabetic: No      Tobacco smoker: No      Systolic Blood Pressure: 160 mmHg      Is BP treated: Yes      HDL Cholesterol: 44 mg/dL      Total Cholesterol: 214 mg/dL     Depression -currently prescribed Prozac 20 mg daily.  Counseling resources were given at his last appointment, but he is not currently in therapy. He continues to take Prozac 20 mg, although he occasionally forgets to take it during weekends. He is uncertain about the adequacy of this dosage. He uses trazodone as needed for sleep, which he finds beneficial. He did not require it last night.    MEDICATIONS  Prozac, metoprolol, losartan, chlorthalidone, Lipitor, trazodone       The following portions of the patient's history were reviewed and updated as appropriate: allergies, current medications, past family history, past medical history, past social history, past surgical history, and problem list.      Current Outpatient Medications:     aspirin 81 MG EC tablet, Take 1 tablet by mouth Daily., Disp: 14 tablet, Rfl: 0    atorvastatin (Lipitor) 10 MG tablet, Take 1 tablet by mouth Daily., Disp: 90 tablet, Rfl: 1    chlorthalidone (HYGROTON) 25 MG tablet, Take 1 tablet by mouth Daily., Disp: 90 tablet, Rfl: 0    FLUoxetine (PROzac) 20 MG capsule, TAKE 1 CAPSULE BY MOUTH DAILY, Disp: 90 capsule, Rfl: 1    Glucosamine-Chondroitin 0272-3016 MG/30ML liquid, Take  by mouth Daily., Disp: , Rfl:     losartan (COZAAR) 100 MG tablet, TAKE 1 TABLET BY MOUTH DAILY, Disp: 30 tablet, Rfl: 0    Magnesium 250 MG tablet, Take  by mouth., Disp: , Rfl:     metoprolol succinate XL (TOPROL-XL) 25 MG 24 hr tablet, TAKE 1 TABLET BY MOUTH DAILY, Disp: 30 tablet, Rfl: 0    Omega-3 1000 MG capsule, Take  by mouth.,  "Disp: , Rfl:     traZODone (DESYREL) 50 MG tablet, TAKE ONE TABLET BY MOUTH ONCE NIGHTLY, Disp: 90 tablet, Rfl: 1        Results  Laboratory Studies  Total cholesterol decreased from 277 to 214. LDL cholesterol decreased from 196 to 140.        Objective    Initial /90  /84 (BP Location: Left arm, Patient Position: Sitting, Cuff Size: Large Adult)   Pulse 87   Temp 97.5 °F (36.4 °C) (Infrared)   Ht 188 cm (74\")   Wt 98.8 kg (217 lb 14.4 oz)   SpO2 98%   BMI 27.98 kg/m²      Body mass index is 27.98 kg/m².           Physical Exam  Constitutional:       General: He is not in acute distress.     Appearance: Normal appearance.   Eyes:      Pupils: Pupils are equal, round, and reactive to light.   Neck:      Vascular: No carotid bruit or JVD.   Cardiovascular:      Rate and Rhythm: Normal rate and regular rhythm.      Pulses: Normal pulses.           Radial pulses are 2+ on the right side and 2+ on the left side.        Posterior tibial pulses are 2+ on the right side and 2+ on the left side.      Heart sounds: Normal heart sounds. No murmur heard.     No friction rub.   Pulmonary:      Effort: Pulmonary effort is normal. No respiratory distress.      Breath sounds: Normal breath sounds. No wheezing or rales.   Musculoskeletal:      Cervical back: Neck supple.      Right lower leg: No edema.      Left lower leg: No edema.   Neurological:      General: No focal deficit present.      Mental Status: He is alert.   Psychiatric:         Mood and Affect: Mood normal.         Behavior: Behavior normal.          Physical Exam        Results for orders placed or performed in visit on 09/12/24   Lipid Panel With / Chol / HDL Ratio    Collection Time: 09/28/24  7:49 AM    Specimen: Blood   Result Value Ref Range    Total Cholesterol 214 (H) 0 - 200 mg/dL    Triglycerides 166 (H) 0 - 150 mg/dL    HDL Cholesterol 44 40 - 60 mg/dL    LDL Cholesterol  140 (H) 0 - 100 mg/dL    VLDL Cholesterol 30 5 - 40 mg/dL    " Chol/HDL Ratio 4.86    CBC (No Diff)    Collection Time: 09/28/24  7:49 AM    Specimen: Blood   Result Value Ref Range    WBC 6.89 3.40 - 10.80 10*3/mm3    RBC 5.53 4.14 - 5.80 10*6/mm3    Hemoglobin 16.4 13.0 - 17.7 g/dL    Hematocrit 48.8 37.5 - 51.0 %    MCV 88.2 79.0 - 97.0 fL    MCH 29.7 26.6 - 33.0 pg    MCHC 33.6 31.5 - 35.7 g/dL    RDW 12.4 12.3 - 15.4 %    RDW-SD 40.2 37.0 - 54.0 fl    MPV 9.1 6.0 - 12.0 fL    Platelets 268 140 - 450 10*3/mm3   Comprehensive Metabolic Panel    Collection Time: 09/28/24  7:49 AM    Specimen: Blood   Result Value Ref Range    Glucose 91 65 - 99 mg/dL    BUN 12 6 - 20 mg/dL    Creatinine 0.97 0.76 - 1.27 mg/dL    Sodium 137 136 - 145 mmol/L    Potassium 3.8 3.5 - 5.2 mmol/L    Chloride 99 98 - 107 mmol/L    CO2 28.1 22.0 - 29.0 mmol/L    Calcium 9.9 8.6 - 10.5 mg/dL    Total Protein 6.7 6.0 - 8.5 g/dL    Albumin 4.7 3.5 - 5.2 g/dL    ALT (SGPT) 25 1 - 41 U/L    AST (SGOT) 15 1 - 40 U/L    Alkaline Phosphatase 52 39 - 117 U/L    Total Bilirubin 1.1 0.0 - 1.2 mg/dL    Globulin 2.0 gm/dL    A/G Ratio 2.4 g/dL    BUN/Creatinine Ratio 12.4 7.0 - 25.0    Anion Gap 9.9 5.0 - 15.0 mmol/L    eGFR 89.9 >60.0 mL/min/1.73   TSH    Collection Time: 09/28/24  7:49 AM    Specimen: Blood   Result Value Ref Range    TSH 1.680 0.270 - 4.200 uIU/mL   PSA Screen    Collection Time: 09/28/24  7:49 AM    Specimen: Blood   Result Value Ref Range    PSA 0.592 0.000 - 4.000 ng/mL   Hemoglobin A1c    Collection Time: 09/28/24  7:49 AM    Specimen: Blood   Result Value Ref Range    Hemoglobin A1C 5.30 4.80 - 5.60 %       Assessment & Plan      Assessment & Plan       1. Essential hypertension  Chronic, (?) control. His blood pressure is elevated in the office today with initial reading of 160/90, and a repeat reading of 150/84.  He is currently on metoprolol 25 mg, losartan 100 mg, and chlorthalidone 25 mg. He has been advised to monitor his blood pressure at home daily for the next week, ensuring  proper technique and conditions are followed. He has been instructed to maintain a healthy diet and regular physical activity to mitigate cardiovascular risk. A log has been provided for him to record these readings. A follow-up message will be sent via [employer] in approximately one week to assess his blood pressure status. If his blood pressure remains high, consideration will be given to adding a low dose of amlodipine (2.5 or 5 mg) to his current regimen. If his blood pressure remains uncontrolled despite these interventions, a sleep study may be considered to evaluate for potential sleep apnea.    2. Hypercholesterolemia  His cholesterol levels have shown significant improvement, with total cholesterol decreasing from 277 to 214 and LDL cholesterol reducing from 196 to 140. He is currently on a low dose of Lipitor. He has been advised to continue his current medication regimen.    3. Mild episode of recurrent major depressive disorder  4. Grief  He is currently on Prozac 20 mg but occasionally forgets to take it on weekends. He has been advised to be more consistent with his medication intake.    5. Primary insomnia  He uses trazodone as needed for sleep, which he finds beneficial. He has been advised to continue this regimen as needed.       Return in about 6 months (around 9/3/2025) for Annual physical with fasting labs, or sooner based on blood pressure readings over the next week.    CARISSA Alaniz           Note to patient: The 21st Century Cures Act makes medical notes like these available to patients in the interest of transparency. However, be advised this is a medical document. It is intended as peer to peer communication. It is written in medical language and may contain abbreviations or verbiage that are unfamiliar. It may appear blunt or direct. Medical documents are intended to carry relevant information, facts as evident, and the clinical opinion of the practitioner.

## 2025-03-10 ENCOUNTER — TELEPHONE (OUTPATIENT)
Dept: FAMILY MEDICINE CLINIC | Facility: CLINIC | Age: 60
End: 2025-03-10
Payer: COMMERCIAL

## 2025-03-10 NOTE — TELEPHONE ENCOUNTER
Patient states that he will send a picture of his numbers when he gets home.  Patient states that its still running high.    ----- Message from Génesis Lozada sent at 3/10/2025  8:10 AM EDT -----  Regarding: blood pressure    Can we check on Pj and see how his blood pressure has been doing?

## 2025-03-12 RX ORDER — AMLODIPINE BESYLATE 5 MG/1
5 TABLET ORAL DAILY
Qty: 30 TABLET | Refills: 0 | Status: SHIPPED | OUTPATIENT
Start: 2025-03-12

## 2025-03-12 NOTE — TELEPHONE ENCOUNTER
Patient states it was 151/96 this morning. States he sent a picture to us. Will send in another message

## 2025-03-26 DIAGNOSIS — I10 ESSENTIAL HYPERTENSION: ICD-10-CM

## 2025-03-26 RX ORDER — METOPROLOL SUCCINATE 25 MG/1
25 TABLET, EXTENDED RELEASE ORAL DAILY
Qty: 30 TABLET | Refills: 1 | Status: SHIPPED | OUTPATIENT
Start: 2025-03-26

## 2025-03-26 RX ORDER — LOSARTAN POTASSIUM 100 MG/1
100 TABLET ORAL DAILY
Qty: 30 TABLET | Refills: 1 | Status: SHIPPED | OUTPATIENT
Start: 2025-03-26

## 2025-04-07 DIAGNOSIS — I10 ESSENTIAL HYPERTENSION: Primary | ICD-10-CM

## 2025-04-07 RX ORDER — AMLODIPINE BESYLATE 5 MG/1
5 TABLET ORAL DAILY
Qty: 30 TABLET | Refills: 2 | Status: SHIPPED | OUTPATIENT
Start: 2025-04-07

## 2025-04-14 ENCOUNTER — OFFICE VISIT (OUTPATIENT)
Dept: FAMILY MEDICINE CLINIC | Facility: CLINIC | Age: 60
End: 2025-04-14
Payer: COMMERCIAL

## 2025-04-14 VITALS
DIASTOLIC BLOOD PRESSURE: 80 MMHG | HEIGHT: 74 IN | WEIGHT: 215.6 LBS | OXYGEN SATURATION: 96 % | TEMPERATURE: 97.5 F | SYSTOLIC BLOOD PRESSURE: 138 MMHG | HEART RATE: 66 BPM | BODY MASS INDEX: 27.67 KG/M2

## 2025-04-14 DIAGNOSIS — I10 ESSENTIAL HYPERTENSION: ICD-10-CM

## 2025-04-14 DIAGNOSIS — B36.9 FUNGAL DERMATITIS: Primary | ICD-10-CM

## 2025-04-14 RX ORDER — CLOTRIMAZOLE AND BETAMETHASONE DIPROPIONATE 10; .64 MG/G; MG/G
1 CREAM TOPICAL 2 TIMES DAILY
Qty: 45 G | Refills: 0 | Status: SHIPPED | OUTPATIENT
Start: 2025-04-14

## 2025-04-14 NOTE — PROGRESS NOTES
Answers submitted by the patient for this visit:  High Blood Pressure Questionnaire (Submitted on 4/7/2025)  Chief Complaint: Hypertension  Chronicity: recurrent  Onset: more than 1 year ago  Progression since onset: improved  anxiety: No  blurred vision: No  chest pain: No  headaches: Yes  malaise/fatigue: Yes  orthopnea: No  palpitations: No  peripheral edema: No  shortness of breath: No  Agents associated with hypertension: no associated agents  Compliance problems: exercise  Patient or patient representative verbalized consent for the use of Ambient Listening during the visit with  CARISSA Alaniz for chart documentation. 4/14/2025  16:26 EDT    Chief Complaint   Patient presents with    Hypertension       Subjective      Patient ID: Pj is a 60 y.o. male.     Chief Complaint   Patient presents with    Hypertension        History of Present Illness     History of Present Illness  Pj Steele presents today for follow-up on hypertension.  He was previously on losartan 100 mg daily and chlorthalidone 25 mg daily as well as metoprolol 25 mg daily.  His blood pressure was found to be elevated after monitoring at home with readings of 151/96.  Amlodipine 5 mg daily was added to his routine.    His blood pressure readings have been consistently within the range of 130s/80s, with most recent readings of 110s-129/71-83 in the last few days. He typically administers his antihypertensive medication post-lunch but adjusts the timing to the morning if his blood pressure is elevated. He has expressed concern about potential hypotension, as his lowest recorded blood pressure was 114/71.  He has been tolerating the amlodipine well without side effects.    He reports a persistent rash that has been present for over a week. The rash was first noticed last Wednesday, characterized by a small red spot located just below the groin line. Initially, he dismissed it as a minor injury due to its lack of discomfort.  However, the following day, he observed that the rash had migrated slightly downwards. The rash is particularly sensitive to touch, especially when in contact with fabric. He has made efforts to keep the area dry, including wearing boxers or loose clothing over the weekend. His diet includes regular consumption of beets and cinnamon, but he does not believe these are contributing factors as he has not experienced any previous reactions to these foods. He is required to wear long pants at work, which may contribute to the moist environment conducive to a rash.    MEDICATIONS  Amlodipine       The following portions of the patient's history were reviewed and updated as appropriate: allergies, current medications, past family history, past medical history, past social history, past surgical history, and problem list.      Current Outpatient Medications:     amLODIPine (NORVASC) 5 MG tablet, TAKE 1 TABLET BY MOUTH DAILY, Disp: 30 tablet, Rfl: 2    aspirin 81 MG EC tablet, Take 1 tablet by mouth Daily., Disp: 14 tablet, Rfl: 0    atorvastatin (Lipitor) 10 MG tablet, Take 1 tablet by mouth Daily., Disp: 90 tablet, Rfl: 1    chlorthalidone (HYGROTON) 25 MG tablet, Take 1 tablet by mouth Daily., Disp: 90 tablet, Rfl: 0    FLUoxetine (PROzac) 20 MG capsule, TAKE 1 CAPSULE BY MOUTH DAILY, Disp: 90 capsule, Rfl: 1    Glucosamine-Chondroitin 2267-6963 MG/30ML liquid, Take  by mouth Daily., Disp: , Rfl:     losartan (COZAAR) 100 MG tablet, TAKE 1 TABLET BY MOUTH DAILY, Disp: 30 tablet, Rfl: 1    Magnesium 250 MG tablet, Take  by mouth., Disp: , Rfl:     metoprolol succinate XL (TOPROL-XL) 25 MG 24 hr tablet, TAKE 1 TABLET BY MOUTH DAILY, Disp: 30 tablet, Rfl: 1    Omega-3 1000 MG capsule, Take  by mouth., Disp: , Rfl:     traZODone (DESYREL) 50 MG tablet, TAKE ONE TABLET BY MOUTH ONCE NIGHTLY, Disp: 90 tablet, Rfl: 1    clotrimazole-betamethasone (LOTRISONE) 1-0.05 % cream, Apply 1 Application topically to the appropriate area  "as directed 2 (Two) Times a Day., Disp: 45 g, Rfl: 0        Results          Objective      /80   Pulse 66   Temp 97.5 °F (36.4 °C) (Infrared)   Ht 188 cm (74\")   Wt 97.8 kg (215 lb 9.6 oz)   SpO2 96%   BMI 27.68 kg/m²      Body mass index is 27.68 kg/m².           Physical Exam  Constitutional:       General: He is not in acute distress.     Appearance: Normal appearance.   Eyes:      Pupils: Pupils are equal, round, and reactive to light.   Neck:      Vascular: No carotid bruit or JVD.   Cardiovascular:      Rate and Rhythm: Normal rate and regular rhythm.      Pulses: Normal pulses.           Radial pulses are 2+ on the right side and 2+ on the left side.        Posterior tibial pulses are 2+ on the right side and 2+ on the left side.      Heart sounds: Normal heart sounds. No murmur heard.     No friction rub.   Pulmonary:      Effort: Pulmonary effort is normal. No respiratory distress.      Breath sounds: Normal breath sounds. No wheezing or rales.   Musculoskeletal:      Cervical back: Neck supple.      Right lower leg: No edema.      Left lower leg: No edema.   Skin:     Findings: Rash present.             Comments: Fine scattered erythematous raised papules over the left inguinal fold and down the medial dorsum of the thigh that is sensitive to the touch and pruritic per patient without warmth, crusting, vesicles, or purulent drainage.   Neurological:      General: No focal deficit present.      Mental Status: He is alert.   Psychiatric:         Mood and Affect: Mood normal.         Behavior: Behavior normal.          Physical Exam  There are little fine raised bumps along the rash in the groin area.    Vital Signs  Blood pressure was 129/79 this morning.        Assessment & Plan      Assessment & Plan       1. Fungal dermatitis  The rash appears to be of fungal origin, characterized by fine, raised, erythematous papules. It is unlikely to be a drug-induced rash from amlodipine, as such rashes " typically manifest on the trunk or in a more generalized manner rather than localized areas like the groin. The possibility of an allergic reaction is also considered low, given the nature and location of the rash. A prescription for a topical cream with steroid and antifungal properties has been provided. He is instructed to apply this cream twice daily for a maximum duration of 2 weeks. If there is no improvement by the end of the week, he should inform us.  He is advised to apply Aquaphor as a moisture barrier to the area.  - clotrimazole-betamethasone (LOTRISONE) 1-0.05 % cream; Apply 1 Application topically to the appropriate area as directed 2 (Two) Times a Day.  Dispense: 45 g; Refill: 0    2. Essential hypertension  His blood pressure readings have shown significant improvement, with most readings within the normal range. He is tolerating the addition of amlodipine 5 mg well. He is advised to continue monitoring his blood pressure at home and report any persistently high or persistently low readings. He is also encouraged to adopt lifestyle modifications with a heart healthy diet and regular physical activity to help lower his blood pressure. If his blood pressure readings consistently fall below 90/60, discontinuation of the medication will be considered.       Return for Next scheduled follow up.       CARISSA Alaniz           Note to patient: The 21st Century Cures Act makes medical notes like these available to patients in the interest of transparency. However, be advised this is a medical document. It is intended as peer to peer communication. It is written in medical language and may contain abbreviations or verbiage that are unfamiliar. It may appear blunt or direct. Medical documents are intended to carry relevant information, facts as evident, and the clinical opinion of the practitioner.

## 2025-05-22 DIAGNOSIS — I10 ESSENTIAL HYPERTENSION: ICD-10-CM

## 2025-05-22 RX ORDER — LOSARTAN POTASSIUM 100 MG/1
100 TABLET ORAL DAILY
Qty: 30 TABLET | Refills: 1 | Status: SHIPPED | OUTPATIENT
Start: 2025-05-22

## 2025-05-22 RX ORDER — METOPROLOL SUCCINATE 25 MG/1
25 TABLET, EXTENDED RELEASE ORAL DAILY
Qty: 30 TABLET | Refills: 1 | Status: SHIPPED | OUTPATIENT
Start: 2025-05-22

## 2025-05-23 DIAGNOSIS — F43.21 GRIEF: ICD-10-CM

## 2025-05-23 DIAGNOSIS — F33.0 MILD EPISODE OF RECURRENT MAJOR DEPRESSIVE DISORDER: ICD-10-CM

## 2025-05-23 DIAGNOSIS — F51.01 PRIMARY INSOMNIA: ICD-10-CM

## 2025-05-23 RX ORDER — TRAZODONE HYDROCHLORIDE 50 MG/1
50 TABLET ORAL NIGHTLY
Qty: 90 TABLET | Refills: 1 | Status: SHIPPED | OUTPATIENT
Start: 2025-05-23

## 2025-07-05 DIAGNOSIS — I10 ESSENTIAL HYPERTENSION: ICD-10-CM

## 2025-07-07 RX ORDER — AMLODIPINE BESYLATE 5 MG/1
5 TABLET ORAL DAILY
Qty: 90 TABLET | Refills: 1 | Status: SHIPPED | OUTPATIENT
Start: 2025-07-07

## 2025-07-11 ENCOUNTER — OFFICE VISIT (OUTPATIENT)
Dept: FAMILY MEDICINE CLINIC | Facility: CLINIC | Age: 60
End: 2025-07-11
Payer: COMMERCIAL

## 2025-07-11 VITALS
HEART RATE: 69 BPM | SYSTOLIC BLOOD PRESSURE: 120 MMHG | OXYGEN SATURATION: 98 % | DIASTOLIC BLOOD PRESSURE: 82 MMHG | TEMPERATURE: 97.5 F | HEIGHT: 74 IN | WEIGHT: 217.4 LBS | BODY MASS INDEX: 27.9 KG/M2

## 2025-07-11 DIAGNOSIS — M72.2 PLANTAR FASCIITIS, LEFT: Primary | ICD-10-CM

## 2025-07-11 DIAGNOSIS — I10 ESSENTIAL HYPERTENSION: ICD-10-CM

## 2025-07-11 PROCEDURE — 99213 OFFICE O/P EST LOW 20 MIN: CPT

## 2025-07-11 RX ORDER — ACETAMINOPHEN 500 MG
500 TABLET ORAL EVERY 6 HOURS SCHEDULED
COMMUNITY

## 2025-07-11 RX ORDER — KRILL/OM-3/DHA/EPA/PHOSPHO/AST 500-110 MG
CAPSULE ORAL
COMMUNITY

## 2025-07-11 RX ORDER — MULTIVIT-MIN/FERROUS GLUCONATE 9 MG/15 ML
LIQUID (ML) ORAL
COMMUNITY

## 2025-07-11 NOTE — PROGRESS NOTES
"Patient or patient representative verbalized consent for the use of Ambient Listening during the visit with  CARISSA Alaniz for chart documentation. 7/11/2025  14:55 EDT    Chief Complaint   Patient presents with    Pain     Heel of left foot        Subjective      Patient ID: Pj is a 60 y.o. male.     Chief Complaint   Patient presents with    Pain     Heel of left foot         History of Present Illness     History of Present Illness  The patient is a 50-year-old  male who presents today for evaluation of left heel pain.    He has been experiencing left heel pain for approximately 1.5 months, which he initially thought was improving. The onset of the pain was sudden, occurring upon waking one morning without any known injury to the foot. The discomfort is primarily located in the medial aspect of the heel and intensifies when pressure is applied to the side of the foot. He reports that the pain is more deep rather than superficial. Flexing the foot causes some pain, especially in the morning, while pointing the toes outward and inward also pulls on the affected area. Rotating the foot is described as sore and stiff. He has noticed that the pain subsides when he walks on it. He has been using old shoes but recently purchased new ones, which seem to alleviate the pain slightly.    His blood pressure is well-controlled. He continues to take his prescribed medications and does not require any medication refills at this time.       The following portions of the patient's history were reviewed and updated as appropriate: allergies, current medications, past family history, past medical history, past social history, past surgical history, and problem list.    Results          Objective      /82   Pulse 69   Temp 97.5 °F (36.4 °C) (Infrared)   Ht 188 cm (74\")   Wt 98.6 kg (217 lb 6.4 oz)   SpO2 98%   BMI 27.91 kg/m²      Body mass index is 27.91 kg/m².           Physical Exam "     Physical Exam  Musculoskeletal: Tenderness to palpation of the medial aspect of the left heel. Pain with dorsiflexion and plantarflexion. Pain with inversion and eversion. Strength is equal bilaterally.  No erythema, edema, or deformity noted.        Assessment & Plan  1. Plantar fasciitis.  - Symptoms and physical examination findings are consistent with plantar fasciitis.  - He was advised to use orthotic inserts in his shoes for arch support. Stretching exercises, such as rolling a frozen water bottle under the foot and performing alphabet exercises, were recommended.  - Over-the-counter NSAIDs like ibuprofen or Aleve can be used for pain management. Wearing a splint at bedtime to keep the foot flexed was suggested if morning pain is severe.  - Information on plantar fasciitis will be provided in the after-visit summary. If symptoms worsen or do not improve with conservative management, a referral to a podiatrist for custom orthotics or steroid injections will be considered.    2. Blood pressure management.  - He is currently taking amlodipine, metoprolol, losartan, and chlorthalidone and reports that his blood pressure is well-controlled.  - No refills are needed at this time.  - Blood pressure remains stable with current medication regimen.  - Follow-up in September 2025.    Assessment & Plan       Diagnoses and all orders for this visit:    1. Plantar fasciitis, left (Primary)    2. Essential hypertension        Return if symptoms worsen or fail to improve, for Next scheduled follow up.       CARISSA Alaniz           Note to patient: The 21st Century Cures Act makes medical notes like these available to patients in the interest of transparency. However, be advised this is a medical document. It is intended as peer to peer communication. It is written in medical language and may contain abbreviations or verbiage that are unfamiliar. It may appear blunt or direct. Medical documents are intended to  carry relevant information, facts as evident, and the clinical opinion of the practitioner.

## 2025-07-20 DIAGNOSIS — I10 ESSENTIAL HYPERTENSION: ICD-10-CM

## 2025-07-21 RX ORDER — METOPROLOL SUCCINATE 25 MG/1
25 TABLET, EXTENDED RELEASE ORAL DAILY
Qty: 90 TABLET | Refills: 1 | Status: SHIPPED | OUTPATIENT
Start: 2025-07-21

## 2025-07-21 RX ORDER — LOSARTAN POTASSIUM 100 MG/1
100 TABLET ORAL DAILY
Qty: 90 TABLET | Refills: 1 | Status: SHIPPED | OUTPATIENT
Start: 2025-07-21

## 2025-08-28 DIAGNOSIS — Z00.00 ANNUAL PHYSICAL EXAM: Primary | ICD-10-CM

## 2025-08-28 DIAGNOSIS — I10 ESSENTIAL HYPERTENSION: ICD-10-CM

## 2025-08-28 DIAGNOSIS — E78.00 HYPERCHOLESTEROLEMIA: ICD-10-CM

## 2025-08-29 DIAGNOSIS — Z00.00 ANNUAL PHYSICAL EXAM: ICD-10-CM

## 2025-08-29 DIAGNOSIS — E78.00 HYPERCHOLESTEROLEMIA: ICD-10-CM

## 2025-08-29 DIAGNOSIS — I10 ESSENTIAL HYPERTENSION: ICD-10-CM

## (undated) DEVICE — MASK,FACE,FLUID RESIST,SHLD,EARLOOP: Brand: MEDLINE

## (undated) DEVICE — SPNG GZ WOVN 4X4IN 12PLY 10/BX STRL

## (undated) DEVICE — SOL ISO/ALC RUB 70PCT 4OZ

## (undated) DEVICE — BOWL PLSTC LG 32OZ BLU STRL

## (undated) DEVICE — FRCP BX RADJAW4 NDL 2.8 240CM LG OG BX40

## (undated) DEVICE — Device

## (undated) DEVICE — SUT VIC 3/0 SH CR8 18IN J864D

## (undated) DEVICE — WEREWOLF FLOW 90 COBLATION WAND: Brand: COBLATION

## (undated) DEVICE — BW-412T DISP COMBO CLEANING BRUSH: Brand: SINGLE USE COMBINATION CLEANING BRUSH

## (undated) DEVICE — TBG PENCL TELESCP MEGADYNE SMOKE EVAC 10FT

## (undated) DEVICE — TOWEL,OR,DSP,ST,BLUE,STD,4/PK,20PK/CS: Brand: MEDLINE

## (undated) DEVICE — GOWN ,SIRUS,NONREINFORCED SMALL: Brand: MEDLINE

## (undated) DEVICE — DRSNG TELFA PAD NONADH STR 1S 3X8IN

## (undated) DEVICE — PATIENT RETURN ELECTRODE, SINGLE-USE, CONTACT QUALITY MONITORING, ADULT, WITH 9FT CORD, FOR PATIENTS WEIGING OVER 33LBS. (15KG): Brand: MEGADYNE

## (undated) DEVICE — SUT MNCRYL 4/0 PS2 18 IN

## (undated) DEVICE — KT ORCA ORCAPOD DISP STRL

## (undated) DEVICE — PK SHLDR ARTHSCP 90

## (undated) DEVICE — PREP SOL POVIDONE/IODINE BT 4OZ

## (undated) DEVICE — SHOULDER STABILIZATION KIT,                                    DISPOSABLE 12 PER BOX

## (undated) DEVICE — ADAPT DB SPIKE 2PCT FOR AR6400 TBG

## (undated) DEVICE — SUT PDS 0 CT1 36IN Z346H

## (undated) DEVICE — SUCTION CANISTER, 1000CC,SAFELINER: Brand: DEROYAL

## (undated) DEVICE — DRSNG SURESITE WNDW 4X4.5

## (undated) DEVICE — TP CLTH MEDIPORE SFT 4IN 10YD

## (undated) DEVICE — GLV SURG SENSICARE PI PF LF 7 GRN STRL

## (undated) DEVICE — 3M™ STERI-DRAPE™ U-DRAPE 1015: Brand: STERI-DRAPE™

## (undated) DEVICE — DECANTER: Brand: UNBRANDED

## (undated) DEVICE — ST TB GOFLO STRL

## (undated) DEVICE — WRAP SHOULDER COLD THERAPY

## (undated) DEVICE — GLV SURG SENSICARE PI LF PF 7.0

## (undated) DEVICE — INTENT TO BE USED WITH SUTURE MATERIAL FOR TISSUE CLOSURE: Brand: RICHARD-ALLAN® NEEDLE 1/2 CIRCLE TAPER

## (undated) DEVICE — CANNULA THREADED FLEX 5.5 X 72MM: Brand: CLEAR-TRAC

## (undated) DEVICE — GLV SURG SENSICARE W/ALOE PF LF 6.5 STRL

## (undated) DEVICE — GLV SURG NEOPRN SENSICARE SZ8

## (undated) DEVICE — GOWN ISOL W/THUMB UNIV BLU BX/15

## (undated) DEVICE — T-MAX DISPOSABLE FACE MASK 8 PER BOX

## (undated) DEVICE — SYS CLS SKIN PREMIERPRO EXOFINFUSION 22CM

## (undated) DEVICE — SYR LL 3CC

## (undated) DEVICE — NDL HYPO SFTY GLD 22G 1 1/2IN

## (undated) DEVICE — DECANT BG O JET

## (undated) DEVICE — Q-FIX REUSABLE 2.8MM PATHFINDER OBTURATOR: Brand: Q-FIX

## (undated) DEVICE — SUCTION CANISTER, 3000CC,SAFELINER: Brand: DEROYAL

## (undated) DEVICE — APPL CHLORAPREP HI/LITE 26ML ORNG

## (undated) DEVICE — SUT ETHLN 3/0 PS2 18 IN 1669H

## (undated) DEVICE — SYR LUERLOK 20CC BX/50

## (undated) DEVICE — 4.5 FULL RADIUS BONECUTTER BLADES,                                    YELLOW, 8000 MAXIMUM RPM, PACKAGED 6                                    PER BOX, STERILE

## (undated) DEVICE — VIAL FORMALIN CAP 10P 40ML

## (undated) DEVICE — DRSNG GZ PETROLTM XEROFORM CURAD 1X8IN STRL

## (undated) DEVICE — SYR LUERLOK 50ML

## (undated) DEVICE — TRAP FLD MINIVAC MEGADYNE 100ML

## (undated) DEVICE — ENDOSCOPY PORT CONNECTOR FOR OLYMPUS® SCOPES: Brand: ERBE

## (undated) DEVICE — 1010 S-DRAPE TOWEL DRAPE 10/BX: Brand: STERI-DRAPE™

## (undated) DEVICE — Device: Brand: DEFENDO AIR/WATER/SUCTION AND BIOPSY VALVE